# Patient Record
Sex: MALE | Race: WHITE | Employment: UNEMPLOYED | URBAN - NONMETROPOLITAN AREA
[De-identification: names, ages, dates, MRNs, and addresses within clinical notes are randomized per-mention and may not be internally consistent; named-entity substitution may affect disease eponyms.]

---

## 2017-09-11 ENCOUNTER — OFFICE VISIT (OUTPATIENT)
Dept: GASTROENTEROLOGY | Age: 37
End: 2017-09-11
Payer: COMMERCIAL

## 2017-09-11 VITALS
SYSTOLIC BLOOD PRESSURE: 118 MMHG | OXYGEN SATURATION: 97 % | HEIGHT: 75 IN | HEART RATE: 104 BPM | WEIGHT: 315 LBS | DIASTOLIC BLOOD PRESSURE: 72 MMHG | BODY MASS INDEX: 39.17 KG/M2

## 2017-09-11 DIAGNOSIS — K30 INDIGESTION: ICD-10-CM

## 2017-09-11 DIAGNOSIS — R14.2 BELCHING: ICD-10-CM

## 2017-09-11 DIAGNOSIS — R13.19 ESOPHAGEAL DYSPHAGIA: Primary | ICD-10-CM

## 2017-09-11 PROCEDURE — 99204 OFFICE O/P NEW MOD 45 MIN: CPT | Performed by: NURSE PRACTITIONER

## 2017-09-11 RX ORDER — OMEPRAZOLE 20 MG/1
20 CAPSULE, DELAYED RELEASE ORAL
Qty: 30 CAPSULE | Refills: 3 | Status: SHIPPED | OUTPATIENT
Start: 2017-09-11 | End: 2018-04-11

## 2017-09-11 RX ORDER — LAMOTRIGINE 25 MG/1
TABLET ORAL
Status: ON HOLD | COMMUNITY
Start: 2017-09-01 | End: 2017-10-05 | Stop reason: SINTOL

## 2017-09-11 ASSESSMENT — ENCOUNTER SYMPTOMS
COUGH: 0
NAUSEA: 0
VOMITING: 0
ABDOMINAL PAIN: 0
VOICE CHANGE: 0
CONSTIPATION: 0
BLOOD IN STOOL: 0
SHORTNESS OF BREATH: 0
RECTAL PAIN: 0
ABDOMINAL DISTENTION: 0
TROUBLE SWALLOWING: 1
DIARRHEA: 0
SORE THROAT: 0
CHEST TIGHTNESS: 0
BACK PAIN: 0

## 2017-09-16 ENCOUNTER — TRANSCRIBE ORDERS (OUTPATIENT)
Dept: LAB | Facility: HOSPITAL | Age: 37
End: 2017-09-16

## 2017-09-16 ENCOUNTER — LAB (OUTPATIENT)
Dept: LAB | Facility: HOSPITAL | Age: 37
End: 2017-09-16

## 2017-09-16 DIAGNOSIS — R53.83 OTHER FATIGUE: Primary | ICD-10-CM

## 2017-09-16 DIAGNOSIS — R53.83 OTHER FATIGUE: ICD-10-CM

## 2017-09-16 LAB
25(OH)D3 SERPL-MCNC: 14 NG/ML (ref 30–100)
ALBUMIN SERPL-MCNC: 4.2 G/DL (ref 3.5–5)
ALBUMIN/GLOB SERPL: 1.2 G/DL (ref 1.1–2.5)
ALP SERPL-CCNC: 79 U/L (ref 24–120)
ALT SERPL W P-5'-P-CCNC: 55 U/L (ref 0–54)
ANION GAP SERPL CALCULATED.3IONS-SCNC: 10 MMOL/L (ref 4–13)
AST SERPL-CCNC: 27 U/L (ref 7–45)
AUTO MIXED CELLS #: 0.4 10*3/MM3 (ref 0.1–2.6)
AUTO MIXED CELLS %: 6.9 % (ref 0.1–24)
BILIRUB SERPL-MCNC: 1.3 MG/DL (ref 0.1–1)
BUN BLD-MCNC: 8 MG/DL (ref 5–21)
BUN/CREAT SERPL: 12.7
CALCIUM SPEC-SCNC: 9.2 MG/DL (ref 8.4–10.4)
CHLORIDE SERPL-SCNC: 103 MMOL/L (ref 98–110)
CHOLEST SERPL-MCNC: 251 MG/DL (ref 130–200)
CO2 SERPL-SCNC: 26 MMOL/L (ref 24–31)
CREAT BLD-MCNC: 0.63 MG/DL (ref 0.5–1.4)
ERYTHROCYTE [DISTWIDTH] IN BLOOD BY AUTOMATED COUNT: 12.3 % (ref 12–15)
GFR SERPL CREATININE-BSD FRML MDRD: 144 ML/MIN/1.73
GLOBULIN UR ELPH-MCNC: 3.5 GM/DL
GLUCOSE BLD-MCNC: 331 MG/DL (ref 70–100)
HCT VFR BLD AUTO: 44.9 % (ref 40–52)
HDLC SERPL-MCNC: 37 MG/DL
HGB BLD-MCNC: 16.3 G/DL (ref 14–18)
LDLC SERPL CALC-MCNC: ABNORMAL MG/DL (ref 0–99)
LDLC/HDLC SERPL: ABNORMAL {RATIO}
LYMPHOCYTES # BLD AUTO: 2.3 10*3/MM3 (ref 0.8–7)
LYMPHOCYTES NFR BLD AUTO: 35.8 % (ref 15–45)
MCH RBC QN AUTO: 32.3 PG (ref 28–32)
MCHC RBC AUTO-ENTMCNC: 36.3 G/DL (ref 33–36)
MCV RBC AUTO: 89.1 FL (ref 82–95)
NEUTROPHILS # BLD AUTO: 3.8 10*3/MM3 (ref 1.5–8.3)
NEUTROPHILS NFR BLD AUTO: 57.3 % (ref 39–78)
PLATELET # BLD AUTO: 155 10*3/MM3 (ref 130–400)
PMV BLD AUTO: 9.7 FL (ref 6–12)
POTASSIUM BLD-SCNC: 4 MMOL/L (ref 3.5–5.3)
PROT SERPL-MCNC: 7.7 G/DL (ref 6.3–8.7)
RBC # BLD AUTO: 5.04 10*6/MM3 (ref 4.2–5.4)
SODIUM BLD-SCNC: 139 MMOL/L (ref 135–145)
TRIGL SERPL-MCNC: 575 MG/DL (ref 0–149)
TSH SERPL DL<=0.05 MIU/L-ACNC: 0.85 MIU/ML (ref 0.47–4.68)
VIT B12 BLD-MCNC: 494 PG/ML (ref 239–931)
VLDLC SERPL-MCNC: ABNORMAL MG/DL
WBC NRBC COR # BLD: 6.5 10*3/MM3 (ref 4.8–10.8)

## 2017-09-16 PROCEDURE — 36415 COLL VENOUS BLD VENIPUNCTURE: CPT

## 2017-09-16 PROCEDURE — 85025 COMPLETE CBC W/AUTO DIFF WBC: CPT

## 2017-09-16 PROCEDURE — 84403 ASSAY OF TOTAL TESTOSTERONE: CPT | Performed by: NURSE PRACTITIONER

## 2017-09-16 PROCEDURE — 84443 ASSAY THYROID STIM HORMONE: CPT | Performed by: NURSE PRACTITIONER

## 2017-09-16 PROCEDURE — 80053 COMPREHEN METABOLIC PANEL: CPT

## 2017-09-16 PROCEDURE — 82306 VITAMIN D 25 HYDROXY: CPT | Performed by: NURSE PRACTITIONER

## 2017-09-16 PROCEDURE — 84402 ASSAY OF FREE TESTOSTERONE: CPT | Performed by: NURSE PRACTITIONER

## 2017-09-16 PROCEDURE — 82607 VITAMIN B-12: CPT | Performed by: NURSE PRACTITIONER

## 2017-09-16 PROCEDURE — 80061 LIPID PANEL: CPT

## 2017-09-19 LAB
TESTOST FREE SERPL-MCNC: 10.8 PG/ML (ref 8.7–25.1)
TESTOST SERPL-MCNC: 164 NG/DL (ref 264–916)

## 2017-09-25 ENCOUNTER — LAB (OUTPATIENT)
Dept: LAB | Facility: HOSPITAL | Age: 37
End: 2017-09-25

## 2017-09-25 ENCOUNTER — TRANSCRIBE ORDERS (OUTPATIENT)
Dept: GENERAL RADIOLOGY | Facility: HOSPITAL | Age: 37
End: 2017-09-25

## 2017-09-25 DIAGNOSIS — R73.9 HYPERGLYCEMIA, UNSPECIFIED: Primary | ICD-10-CM

## 2017-09-25 DIAGNOSIS — R73.9 HYPERGLYCEMIA, UNSPECIFIED: ICD-10-CM

## 2017-09-25 LAB
AVERAGE GLUCOSE: NORMAL
HBA1C MFR BLD: 12.8 %
HBA1C MFR BLD: 12.8 %

## 2017-09-25 PROCEDURE — 36415 COLL VENOUS BLD VENIPUNCTURE: CPT

## 2017-09-25 PROCEDURE — 83036 HEMOGLOBIN GLYCOSYLATED A1C: CPT

## 2017-10-05 ENCOUNTER — ANESTHESIA EVENT (OUTPATIENT)
Dept: ENDOSCOPY | Age: 37
End: 2017-10-05
Payer: COMMERCIAL

## 2017-10-05 ENCOUNTER — HOSPITAL ENCOUNTER (OUTPATIENT)
Age: 37
Setting detail: OUTPATIENT SURGERY
Discharge: HOME | End: 2017-10-05
Attending: INTERNAL MEDICINE | Admitting: INTERNAL MEDICINE
Payer: COMMERCIAL

## 2017-10-05 ENCOUNTER — ANESTHESIA (OUTPATIENT)
Dept: ENDOSCOPY | Age: 37
End: 2017-10-05
Payer: COMMERCIAL

## 2017-10-05 VITALS
OXYGEN SATURATION: 99 % | HEIGHT: 75 IN | RESPIRATION RATE: 16 BRPM | BODY MASS INDEX: 39.17 KG/M2 | DIASTOLIC BLOOD PRESSURE: 77 MMHG | SYSTOLIC BLOOD PRESSURE: 119 MMHG | TEMPERATURE: 97.7 F | WEIGHT: 315 LBS | HEART RATE: 91 BPM

## 2017-10-05 VITALS
OXYGEN SATURATION: 96 % | SYSTOLIC BLOOD PRESSURE: 119 MMHG | DIASTOLIC BLOOD PRESSURE: 89 MMHG | RESPIRATION RATE: 14 BRPM

## 2017-10-05 LAB
GLUCOSE BLD-MCNC: 356 MG/DL (ref 70–99)
PERFORMED ON: ABNORMAL

## 2017-10-05 PROCEDURE — 82948 REAGENT STRIP/BLOOD GLUCOSE: CPT

## 2017-10-05 PROCEDURE — 87077 CULTURE AEROBIC IDENTIFY: CPT

## 2017-10-05 PROCEDURE — 3609012400 HC EGD TRANSORAL BIOPSY SINGLE/MULTIPLE: Performed by: INTERNAL MEDICINE

## 2017-10-05 PROCEDURE — 2500000003 HC RX 250 WO HCPCS: Performed by: NURSE ANESTHETIST, CERTIFIED REGISTERED

## 2017-10-05 PROCEDURE — 2580000003 HC RX 258: Performed by: INTERNAL MEDICINE

## 2017-10-05 PROCEDURE — 3700000000 HC ANESTHESIA ATTENDED CARE: Performed by: INTERNAL MEDICINE

## 2017-10-05 PROCEDURE — 88305 TISSUE EXAM BY PATHOLOGIST: CPT

## 2017-10-05 PROCEDURE — 7100000010 HC PHASE II RECOVERY - FIRST 15 MIN: Performed by: INTERNAL MEDICINE

## 2017-10-05 PROCEDURE — 7100000011 HC PHASE II RECOVERY - ADDTL 15 MIN: Performed by: INTERNAL MEDICINE

## 2017-10-05 PROCEDURE — 6360000002 HC RX W HCPCS: Performed by: NURSE ANESTHETIST, CERTIFIED REGISTERED

## 2017-10-05 PROCEDURE — 43239 EGD BIOPSY SINGLE/MULTIPLE: CPT | Performed by: INTERNAL MEDICINE

## 2017-10-05 PROCEDURE — 43248 EGD GUIDE WIRE INSERTION: CPT | Performed by: INTERNAL MEDICINE

## 2017-10-05 RX ORDER — LIDOCAINE HYDROCHLORIDE 10 MG/ML
1 INJECTION, SOLUTION INFILTRATION; PERINEURAL ONCE
Status: DISCONTINUED | OUTPATIENT
Start: 2017-10-05 | End: 2017-10-05 | Stop reason: HOSPADM

## 2017-10-05 RX ORDER — SODIUM CHLORIDE, SODIUM LACTATE, POTASSIUM CHLORIDE, CALCIUM CHLORIDE 600; 310; 30; 20 MG/100ML; MG/100ML; MG/100ML; MG/100ML
INJECTION, SOLUTION INTRAVENOUS CONTINUOUS
Status: DISCONTINUED | OUTPATIENT
Start: 2017-10-05 | End: 2017-10-05 | Stop reason: HOSPADM

## 2017-10-05 RX ORDER — ATORVASTATIN CALCIUM 20 MG/1
20 TABLET, FILM COATED ORAL DAILY
COMMUNITY
End: 2018-04-11

## 2017-10-05 RX ORDER — PROPOFOL 10 MG/ML
INJECTION, EMULSION INTRAVENOUS PRN
Status: DISCONTINUED | OUTPATIENT
Start: 2017-10-05 | End: 2017-10-05 | Stop reason: SDUPTHER

## 2017-10-05 RX ORDER — MIDAZOLAM HYDROCHLORIDE 1 MG/ML
INJECTION INTRAMUSCULAR; INTRAVENOUS PRN
Status: DISCONTINUED | OUTPATIENT
Start: 2017-10-05 | End: 2017-10-05 | Stop reason: SDUPTHER

## 2017-10-05 RX ORDER — LIDOCAINE HYDROCHLORIDE 10 MG/ML
INJECTION, SOLUTION EPIDURAL; INFILTRATION; INTRACAUDAL; PERINEURAL PRN
Status: DISCONTINUED | OUTPATIENT
Start: 2017-10-05 | End: 2017-10-05 | Stop reason: SDUPTHER

## 2017-10-05 RX ADMIN — LIDOCAINE HYDROCHLORIDE 5 ML: 10 INJECTION, SOLUTION EPIDURAL; INFILTRATION; INTRACAUDAL; PERINEURAL at 08:23

## 2017-10-05 RX ADMIN — MIDAZOLAM HYDROCHLORIDE 2 MG: 1 INJECTION, SOLUTION INTRAMUSCULAR; INTRAVENOUS at 08:23

## 2017-10-05 RX ADMIN — SODIUM CHLORIDE, POTASSIUM CHLORIDE, SODIUM LACTATE AND CALCIUM CHLORIDE: 600; 310; 30; 20 INJECTION, SOLUTION INTRAVENOUS at 07:53

## 2017-10-05 RX ADMIN — PROPOFOL 400 MG: 10 INJECTION, EMULSION INTRAVENOUS at 08:23

## 2017-10-05 ASSESSMENT — PAIN - FUNCTIONAL ASSESSMENT: PAIN_FUNCTIONAL_ASSESSMENT: 0-10

## 2017-10-05 NOTE — OP NOTE
Post Procedure Note    Name of surgeon / : Aida Rivas DO    Date of Service: 10/05/17    Pre-operative Diagnosis: Active Hospital Problems    Diagnosis Date Noted    Esophageal dysphagia [R13.14] 09/11/2017       Post-operative Diagnosis/Findings: mild distal esophageal stricture      Procedure: Procedure(s):  EGD BIOPSY / dilation over wire, 54 F savory    Anesthesia: Monitor Anesthesia Care    Surgeons/Assistants: Aida Rivas DO    Referring Physician: Enmanuel Bonilla DO    Procedure Note:  After informed consent was obtained, the patient was placed in the left lateral decubitus position and sedated per MAC. The endoscope was advanced down the oropharynx, down the esophagus, through the gastric lumen, into the duodenum. The small bowel appeared normal.  The gastric antrum was normal.  Antral biopsies were obtained for h. Pylori. The gastric body was normal.  Retroflexion was done which showed a normal fundus. The scope was withdrawn. The GE junction was at 45 cm. It was slightly jagged and irregular. Biopsies were obtained. There was a stricture seen in the distal esophagus at 44 cm. The remaining esophagus was normal.  Biopsies were taken from the mid-esophagus to rule out EE. The scope was then advanced back down into the gastric antrum. A guidewire was placed through the endoscope and the endoscope was removed. A 54 Tajik savory dilator was advanced down the length of the esophagus. The dilator and guidewire were removed. The patient tolerated the procedure well. Estimated Blood Loss: Minimal    Complications: None    Specimens:   ID Type Source Tests Collected by Time Destination   1 : MARY Tissue Stomach MARY TEST Shweta Taylor DO 10/5/2017 2538    A : distal esophagus r/o barretts Tissue Esophagus SURGICAL PATHOLOGY Shweta Taylor,  10/5/2017 0827    B : mid esophagus bx r/o EE Tissue Esophagus SURGICAL PATHOLOGY Shweta Taylor,  10/5/2017 0830        Discussion:  The

## 2017-10-05 NOTE — IP AVS SNAPSHOT
After Visit Summary  (Discharge Instructions)    Medication List for Home    Based on the information you provided to us as well as any changes during this visit, the following is your updated medication list.  Compare this with your prescription bottles at home. If you have any questions or concerns, contact your primary care physician's office. Daily Medication List (This medication list can be shared with any healthcare provider who is helping you manage your medications)      These are medications you told us you were taking at home, CONTINUE taking them after you leave the hospital        Last Dose    Next Dose Due AM NOON PM NIGHT    atorvastatin 20 MG tablet   Commonly known as:  LIPITOR   Take 20 mg by mouth daily                                         omeprazole 20 MG delayed release capsule   Commonly known as:  PRILOSEC   Take 1 capsule by mouth every morning (before breakfast)                                                 Allergies as of 10/5/2017        Reactions    Codeine     Niacin And Related       Immunizations as of 10/5/2017     No immunizations on file. Last Vitals          Most Recent Value    Temp  97.7 °F (36.5 °C)    Pulse  96    Resp  16    BP  106/69         After Visit Summary    This summary was created for you. Thank you for entrusting your care to us. The following information includes details about your hospital/visit stay along with steps you should take to help with your recovery once you leave the hospital.  In this packet, you will find information about the topics listed below:    · Instructions about your medications including a list of your home medications  · A summary of your hospital visit  · Follow-up appointments once you have left the hospital  · Your care plan at home      You may receive a survey regarding the care you received during your stay. Your input is valuable to us.  We encourage you to complete and return your survey in the envelope provided. We hope you will choose us in the future for your healthcare needs. Patient Information     Patient Name TYSHAWN Barrientos 1980      Care Provided at:     Name Address Phone       [address] [phone]            Your Visit    Here you will find information about your visit, including the reason for your visit. Please take this sheet with you when you visit your doctor or other health care provider in the future. It will help determine the best possible medical care for you at that time. If you have any questions once you leave the hospital, please call the department phone number listed below. Why you were here     Your primary diagnosis was:  Not on File    Your diagnoses also included:  Esophageal Dysphagia      Visit Information     Date & Time Provider Department Dept. Phone    10/5/2017 Isac Winter DO [facility] [phone]       Follow-up Appointments    Below is a list of your follow-up and future appointments. This may not be a complete list as you may have made appointments directly with providers that we are not aware of or your providers may have made some for you. Please call your providers to confirm appointments. It is important to keep your appointments. Please bring your current insurance card, photo ID, co-pay, and all medication bottles to your appointment. If self-pay, payment is expected at the time of service. Follow-up Information     Please follow up. Why:  As needed      Future Appointments     2017 7:30 AM     Appointment with Manuela Llanes MD at [facility] ([phone])   Please arrive 15 minutes prior to scheduled appointment time to complete paper work, bring photo ID and insurance cards.    [address]        Date Due HIV screening is recommended for all people regardless of risk factors  aged 15-65 years at least once (lifetime) who have never been HIV tested. 4/13/1996    Tetanus Combination Vaccine (1 - Tdap) 4/13/2000    Yearly Flu Vaccine (1) 9/1/2017                 Care Plan Once You Return Home    This section includes instructions you will need to follow once you leave the hospital.  Your care team will discuss these with you, so you and those caring for you know how to best care for your health needs at home. This section may also include educational information about certain health topics that may be of help to you. Discharge Instructions            Esophageal Dilation: What to Expect at Home  Your Recovery  After you have esophageal dilation, you will stay at the hospital or surgery center for 1 to 2 hours. This will allow the medicine to wear off. You will be able to go home after your doctor or nurse checks to make sure you are not having any problems. This care sheet gives you a general idea about how long it will take for you to recover. But each person recovers at a different pace. Follow the steps below to get better as quickly as possible. How can you care for yourself at home? Activity  · Rest as much as you need to after you go home. · You should be able to go back to your usual activities the day after the procedure. Diet  · Follow your doctor's directions for eating after the procedure. · Drink plenty of fluids (unless your doctor has told you not to). Medicines  · Your doctor will tell you if and when you can restart your medicines. He or she will also give you instructions about taking any new medicines. · If you take blood thinners, such as warfarin (Coumadin), clopidogrel (Plavix), or aspirin, be sure to talk to your doctor. He or she will tell you if and when to start taking those medicines again. Make sure that you understand exactly what your doctor wants you to do. Incorporated disclaims any warranty or liability for your use of this information. The patient had an esophageal stricture s/p egd with dilation. Repeat egd with dilation as needed. If he is positive for h. Pylori, we will treat. POST-OP ORDERS: ENDOSCOPY & COLONOSCOPY:    1. Rest today. 2. DO NOT eat or drink until wide awake; eat your usual diet today in moderate amount only. 3. DO NOT drive today. 4. Call physician if you have severe pain, vomiting, fever, rectal bleeding or black bowel movements. 5.  If a biopsy was taken or a polyp removed, you should expect to hear results in about 21 days. If you have heard nothing from your physician by then, call the office for results. 6.  Discharge home when patient awake, vitals signs stable and tolerating liquids. [facility] Signup     Our records indicate that you have an active KAYAK account. You can view your After Visit Summary by going to [email or web address] and logging in with your KAYAK username and password. If you don't have a KAYAK username and password but a parent or guardian has access to your record, the parent or guardian should login with their own KAYAK username and password and access your record to view the After Visit Summary. Additional Information  If you have questions, please contact the physician practice where you receive care. Remember, KAYAK is NOT to be used for urgent needs. For medical emergencies, dial 911. For questions regarding your KAYAK account call [phone]. If you have a clinical question, please call your doctor's office. View your information online  ? Review your current list of  medications, immunization, and allergies. ? Review your future test results online . ?  Review your discharge instructions provided by your caregivers at discharge    Certain functionality such as prescription refills, scheduling

## 2017-10-05 NOTE — H&P
Patient Name: Cari Van  : 1980  MRN: [id number]    Allergies: Allergies   Allergen Reactions    Codeine     Niacin And Related          ENDOSCOPY / COLONOSCOPY / BRONCHOSCOPY      PRE-SEDATION ASSESSMENT      Procedure:    [] Colonoscopy     [x] Endoscopy      [] ERCP      [] Bronchoscopy      [] Other  [] History and Physical completed in chart for Inpatient or within 30 daysfrom office. I have examined the patient's status immediately prior to the procedure and:    [x] No interval change in patient status since H&P completed  [] Interval change in patient status (explained below)           BRIEF H&P    HPI/changes/indicators/diagnosis  Active Hospital Problems    Diagnosis Date Noted    Esophageal dysphagia [R13.14] 2017       Medications:   Prior to Admission medications    Medication Sig Start Date End Date Taking? Authorizing Provider   atorvastatin (LIPITOR) 20 MG tablet Take 20 mg by mouth daily   Yes Historical Provider, MD   omeprazole (PRILOSEC) 20 MG delayed release capsule Take 1 capsule by mouth every morning (before breakfast) 17  Yes ELENA Randall       Allergies:   is allergic to codeine and niacin and related. Vital Signs:   Vitals:    10/05/17 0736   BP: (!) 146/94   Pulse: 98   Resp: 18   Temp: 98.3 °F (36.8 °C)   SpO2: 95%       ROS:  Cardiac:  [x]WNL  []Comments:  Pulmonary:  [x]WNL   []Comments:  Neuro/Mental Status:  [x]WNL  []Comments:  Abdominal:                   Active Hospital Problems    Diagnosis Date Noted    Esophageal dysphagia [R13.14] 2017        All other pertinent GI symptoms negative.     Physical Exam:  Cardiac:  [x]WNL  []Comments:  Pulmonary:  [x]WNL   []Comments:  Neuro/Mental Status:  [x]WNL  []Comments:  Abdominal:  [x]WNL    []Comments:  Other:   []WNL  []Comments:    Informed Consent:  The risks and benefits of the procedure have been discussed with either the patient or if they cannot consent, their

## 2017-10-05 NOTE — IP AVS SNAPSHOT
Patient Information     Patient Name TYSHAWN Diane 1980      SEDATION/ANALGESIA INFORMATION/HOME GOING ADVICE     SEDATION / ANALGESIA INFORMATION / [address] 86 have received the sedation/analgesia medication during your visit    Sedation/analgesia is used during short medical procedures under controlled supervision. The medication will produce a strong relaxation. You will be able to hear, speak and follow instructions, but your memory and alertness will be decreased. You will be able to swallow and breathe on your own. During sedation/analgesia your blood pressure, heart and breathing will be watched closely. After the procedure, you may not remember what was said or done. You may have the following effects from the medication. \" Drowsiness, dizziness, sleepiness or confusion. \" Difficulty remembering or delayed reaction times. \" Loss of fine muscle control or difficulty with your balance especially while walking. \" Difficulty focusing or blurred vision. You may not be aware of slight changes in your behavior and/or your reaction time because of the medication used during the procedure. Therefore you should follow these instructions. \" Have someone responsible help you with your care. \" Do not drive for 24 hours. \" Do not operate equipment for 24 hours (lawnmowers, power tools, kitchen accessories, stove). \" Do not drink any alcoholic beverages for a minimum of 24 hours. \" Do not make important personal, legal or business decisions for 24 hours. \" You may experience dizziness or lightheadedness. Move slowly and carefully, do not make sudden position changes. \" Drink extra amounts of fluids today. \" Increase your diet as tolerated (unless you have received specific instructions from your doctor). \" If you feel nauseated, continue with liquids until the nausea is gone. \" Notify your physician if you have not urinated within 8 hours after the procedure. \" Resume your medications unless otherwise instructed. Contact your physician if you have any questions or concerns.     IF YOU REPORT TO AN EMERGENCY ROOM, DOCTOR'S OFFICE OR HOSPITAL WITHIN 24 HOURS AFTER YOUR PROCEDURE, BRING THIS SHEET AND YOUR AFTER VISIT SUMMARY WITH YOU AND GIVE IT TO THE PHYSICIAN OR NURSE ATTENDING YOU.

## 2017-10-05 NOTE — ANESTHESIA POSTPROCEDURE EVALUATION
Department of Anesthesiology  Postprocedure Note    Patient: Lui Ackerman  MRN: [id number]  [place]: 1980  Date of evaluation: 10/5/2017  Time:  8:33 AM     Procedure Summary     Date Anesthesia Start Anesthesia Stop Room / Location    10/05/17 0818  [facility] 11 / [facility]       Procedure Diagnosis Surgeon Responsible Provider    EGD BIOPSY (N/A ) (DYSPHAGIA, INDIGESTION) DO Froilan Mejia CRNA          Anesthesia Type: general, TIVA    Arben Phase I: Arben Score: 10    Arben Phase II:      Last vitals:  BP (!) 146/94 (10/05/17 0736)    Temp 98.3 °F (36.8 °C) (10/05/17 0736)    Pulse 98 (10/05/17 0736)   Resp 18 (10/05/17 0736)    SpO2 95 % (10/05/17 0736)      Anesthesia Post Evaluation    Patient location during evaluation: bedside  Patient participation: complete - patient participated  Level of consciousness: sleepy but conscious  Pain score: 0  Airway patency: patent  Nausea & Vomiting: no nausea and no vomiting  Complications: no  Cardiovascular status: hemodynamically stable and blood pressure returned to baseline  Respiratory status: acceptable and nasal cannula  Hydration status: stable

## 2017-10-06 LAB — CLOTEST: NEGATIVE

## 2017-11-01 ENCOUNTER — OFFICE VISIT (OUTPATIENT)
Dept: PRIMARY CARE CLINIC | Age: 37
End: 2017-11-01
Payer: COMMERCIAL

## 2017-11-01 VITALS
WEIGHT: 315 LBS | TEMPERATURE: 97.3 F | BODY MASS INDEX: 39.17 KG/M2 | DIASTOLIC BLOOD PRESSURE: 84 MMHG | OXYGEN SATURATION: 98 % | SYSTOLIC BLOOD PRESSURE: 132 MMHG | HEART RATE: 100 BPM | HEIGHT: 75 IN

## 2017-11-01 DIAGNOSIS — Z23 NEED FOR DIPHTHERIA-TETANUS-PERTUSSIS (TDAP) VACCINE: ICD-10-CM

## 2017-11-01 DIAGNOSIS — Z23 FLU VACCINE NEED: ICD-10-CM

## 2017-11-01 DIAGNOSIS — E11.8 TYPE 2 DIABETES MELLITUS WITH COMPLICATION, WITHOUT LONG-TERM CURRENT USE OF INSULIN (HCC): Primary | ICD-10-CM

## 2017-11-01 PROCEDURE — 90715 TDAP VACCINE 7 YRS/> IM: CPT | Performed by: FAMILY MEDICINE

## 2017-11-01 PROCEDURE — 99203 OFFICE O/P NEW LOW 30 MIN: CPT | Performed by: FAMILY MEDICINE

## 2017-11-01 PROCEDURE — 90688 IIV4 VACCINE SPLT 0.5 ML IM: CPT | Performed by: FAMILY MEDICINE

## 2017-11-01 PROCEDURE — 90471 IMMUNIZATION ADMIN: CPT | Performed by: FAMILY MEDICINE

## 2017-11-01 PROCEDURE — 90472 IMMUNIZATION ADMIN EACH ADD: CPT | Performed by: FAMILY MEDICINE

## 2017-11-01 RX ORDER — BLOOD-GLUCOSE METER
EACH MISCELLANEOUS
Qty: 1 KIT | Refills: 0 | Status: SHIPPED | OUTPATIENT
Start: 2017-11-01

## 2017-11-01 ASSESSMENT — ENCOUNTER SYMPTOMS
EYE PAIN: 0
WHEEZING: 0
ANAL BLEEDING: 0
VOMITING: 0
BLOOD IN STOOL: 0
COLOR CHANGE: 0
NAUSEA: 0
SHORTNESS OF BREATH: 0

## 2017-11-01 NOTE — PROGRESS NOTES
After obtaining consent, and per orders of Dr. Ziggy Dean, injection of Flu given in Left deltoid by Lavelle Lobo. Patient instructed to remain in clinic for 20 minutes afterwards, and to report any adverse reaction to me immediately. After obtaining consent, and per orders of Dr. Ziggy Dean, injection of Tdap given in Left deltoid by Lavelle Lobo. Patient instructed to remain in clinic for 20 minutes afterwards, and to report any adverse reaction to me immediately.

## 2017-11-01 NOTE — PROGRESS NOTES
Rafita Reina is a 40 y.o. male  Chief Complaint   Patient presents with    New Patient     DM  HPI  Rafita Reina presents to the office to establish care. . Patient has long-standing history of diabetes. Patient has been on and off medications over the past 16 years. Patient states he has been unable to tolerate Januvia or metformin. Patient had a rash related to Januvia. Patient had profuse diarrhea with the metformin. Patient is not currently on any medications. Last A1c done at [address] [place] [address] was 12.8. His blood sugar on [facility] showed 355. Patient has recently switched from high carb diet to a keto diet. Patient is not currently on any treatment. Patient also had significantly elevated cholesterol. Patient had elevated triglycerides. Review of Systems   Constitutional: Negative for activity change and appetite change. Eyes: Negative for pain. Respiratory: Negative for shortness of breath and wheezing. Cardiovascular: Negative for chest pain and leg swelling. Gastrointestinal: Negative for anal bleeding, blood in stool, nausea and vomiting. Skin: Negative for color change and rash. Neurological: Negative for seizures and headaches. Prior to Admission medications    Medication Sig Start Date End Date Taking? Authorizing Provider   Blood Glucose Monitoring Suppl (ONE TOUCH ULTRA 2) w/Device KIT Test fasting daily. Disp 100 strips and 100 lancets.  11/1/17  Yes Dee Marinelli MD   Liraglutide (VICTOZA) 18 MG/3ML SOPN SC injection Inject 1.2 mg into the skin daily 11/1/17  Yes Dee Marinelli MD   atorvastatin (LIPITOR) 20 MG tablet Take 20 mg by mouth daily   Yes Historical Provider, MD   omeprazole (PRILOSEC) 20 MG delayed release capsule Take 1 capsule by mouth every morning (before breakfast) 9/11/17  Yes ELENA Allan       Past Medical History:   Diagnosis Date    Diabetes mellitus ([facility] [address]     Hyperlipidemia     Sleep apnea     no longer present with weight loss 10-5-17       Past Surgical History:   Procedure Laterality Date    COLONOSCOPY      MD EGD TRANSORAL BIOPSY SINGLE/MULTIPLE N/A 10/5/2017    Dr Taylor-w/dilation over wire, 47 Icelandic-esophageal stricture-Faith (-) chronic inflammation    TONSILLECTOMY AND ADENOIDECTOMY         Social History     Social History    Marital status:      Spouse name: N/A    Number of children: N/A    Years of education: N/A     Social History Main Topics    Smoking status: Never Smoker    Smokeless tobacco: Never Used    Alcohol use No    Drug use: No    Sexual activity: Not Asked     Other Topics Concern    None     Social History Narrative    None       Physical Exam   Constitutional: He is oriented to person, place, and time. He appears well-developed and well-nourished. HENT:   Head: Normocephalic and atraumatic. Right Ear: External ear normal.   Left Ear: External ear normal.   Nose: Nose normal.   Mouth/Throat: Oropharynx is clear and moist.   Eyes: Conjunctivae are normal. Pupils are equal, round, and reactive to light. No scleral icterus. Neck: No JVD present. Carotid bruit is not present. Cardiovascular: Normal rate, regular rhythm and normal heart sounds. Exam reveals no friction rub. No murmur heard. Pulmonary/Chest: Effort normal and breath sounds normal. No respiratory distress. He has no wheezes. Abdominal: Soft. Bowel sounds are normal. He exhibits no distension and no mass. There is no tenderness. There is no rebound and no guarding. Musculoskeletal: Normal range of motion. Lymphadenopathy:     He has no cervical adenopathy. Right cervical: No superficial cervical and no posterior cervical adenopathy present. Left cervical: No superficial cervical and no posterior cervical adenopathy present. Neurological: He is alert and oriented to person, place, and time. No cranial nerve deficit. Skin: No rash noted. No erythema. Psychiatric: He has a normal mood and affect.

## 2017-11-29 ENCOUNTER — OFFICE VISIT (OUTPATIENT)
Dept: PRIMARY CARE CLINIC | Age: 37
End: 2017-11-29
Payer: COMMERCIAL

## 2017-11-29 VITALS
DIASTOLIC BLOOD PRESSURE: 82 MMHG | WEIGHT: 315 LBS | TEMPERATURE: 96.7 F | HEIGHT: 75 IN | OXYGEN SATURATION: 97 % | BODY MASS INDEX: 39.17 KG/M2 | HEART RATE: 90 BPM | SYSTOLIC BLOOD PRESSURE: 136 MMHG

## 2017-11-29 DIAGNOSIS — E11.42 TYPE 2 DIABETES MELLITUS WITH DIABETIC POLYNEUROPATHY, WITHOUT LONG-TERM CURRENT USE OF INSULIN (HCC): Primary | ICD-10-CM

## 2017-11-29 PROCEDURE — 99213 OFFICE O/P EST LOW 20 MIN: CPT | Performed by: FAMILY MEDICINE

## 2017-11-29 RX ORDER — PREGABALIN 50 MG/1
50 CAPSULE ORAL 2 TIMES DAILY
Qty: 60 CAPSULE | Refills: 3 | Status: SHIPPED | OUTPATIENT
Start: 2017-11-29 | End: 2018-04-11

## 2017-11-29 ASSESSMENT — ENCOUNTER SYMPTOMS
COUGH: 0
SHORTNESS OF BREATH: 0

## 2017-11-29 NOTE — PROGRESS NOTES
Take 20 mg by mouth daily   Yes Historical Provider, MD   omeprazole (PRILOSEC) 20 MG delayed release capsule Take 1 capsule by mouth every morning (before breakfast) 9/11/17  Yes ELENA Dunham       Past Medical History:   Diagnosis Date    Diabetes mellitus ([address]     Hyperlipidemia     Sleep apnea     no longer present with weight loss 10-5-17       Past Surgical History:   Procedure Laterality Date    COLONOSCOPY      CA EGD TRANSORAL BIOPSY SINGLE/MULTIPLE N/A 10/5/2017    Dr Taylor-w/dilation over wire, 47 Slovak-esophageal stricture-Faith (-) chronic inflammation    TONSILLECTOMY AND ADENOIDECTOMY         Social History     Social History    Marital status:      Spouse name: N/A    Number of children: N/A    Years of education: N/A     Social History Main Topics    Smoking status: Never Smoker    Smokeless tobacco: Never Used    Alcohol use No    Drug use: No    Sexual activity: Not Asked     Other Topics Concern    None     Social History Narrative    None     /82   Pulse 90   Temp 96.7 °F (35.9 °C)   Ht 6' 3\" (1.905 m)   Wt (!) 332 lb (150.6 kg)   SpO2 97%   BMI 41.50 kg/m²     Physical Exam   Constitutional: He is oriented to person, place, and time. He appears well-developed and well-nourished. HENT:   Head: Normocephalic and atraumatic. Eyes: Conjunctivae are normal. No scleral icterus. Neck: Carotid bruit is not present. Cardiovascular: Normal rate, regular rhythm and normal heart sounds. Exam reveals no friction rub. No murmur heard. Pulmonary/Chest: Effort normal and breath sounds normal. No respiratory distress. He has no wheezes. Abdominal: Soft. Bowel sounds are normal. He exhibits no distension and no mass. There is no tenderness. There is no rebound and no guarding. Musculoskeletal: Normal range of motion. Lymphadenopathy:        Right cervical: No superficial cervical and no posterior cervical adenopathy present.        Left cervical: No

## 2018-03-06 DIAGNOSIS — E11.42 TYPE 2 DIABETES MELLITUS WITH DIABETIC POLYNEUROPATHY, WITHOUT LONG-TERM CURRENT USE OF INSULIN (HCC): ICD-10-CM

## 2018-04-11 ENCOUNTER — OFFICE VISIT (OUTPATIENT)
Dept: PRIMARY CARE CLINIC | Age: 38
End: 2018-04-11
Payer: COMMERCIAL

## 2018-04-11 VITALS
SYSTOLIC BLOOD PRESSURE: 134 MMHG | DIASTOLIC BLOOD PRESSURE: 82 MMHG | BODY MASS INDEX: 39.17 KG/M2 | TEMPERATURE: 97.3 F | WEIGHT: 315 LBS | OXYGEN SATURATION: 98 % | HEIGHT: 75 IN | HEART RATE: 102 BPM

## 2018-04-11 DIAGNOSIS — Z79.4 TYPE 2 DIABETES MELLITUS WITH COMPLICATION, WITH LONG-TERM CURRENT USE OF INSULIN (HCC): Primary | ICD-10-CM

## 2018-04-11 DIAGNOSIS — E11.8 TYPE 2 DIABETES MELLITUS WITH COMPLICATION, WITH LONG-TERM CURRENT USE OF INSULIN (HCC): Primary | ICD-10-CM

## 2018-04-11 DIAGNOSIS — E11.8 TYPE 2 DIABETES MELLITUS WITH COMPLICATION, WITH LONG-TERM CURRENT USE OF INSULIN (HCC): ICD-10-CM

## 2018-04-11 DIAGNOSIS — Z79.4 TYPE 2 DIABETES MELLITUS WITH COMPLICATION, WITH LONG-TERM CURRENT USE OF INSULIN (HCC): ICD-10-CM

## 2018-04-11 LAB
ALBUMIN SERPL-MCNC: 4.4 G/DL (ref 3.5–5.2)
ALP BLD-CCNC: 62 U/L (ref 40–130)
ALT SERPL-CCNC: 38 U/L (ref 5–41)
ANION GAP SERPL CALCULATED.3IONS-SCNC: 18 MMOL/L (ref 7–19)
AST SERPL-CCNC: 21 U/L (ref 5–40)
BILIRUB SERPL-MCNC: 0.8 MG/DL (ref 0.2–1.2)
BUN BLDV-MCNC: 8 MG/DL (ref 6–20)
CALCIUM SERPL-MCNC: 9.5 MG/DL (ref 8.6–10)
CHLORIDE BLD-SCNC: 98 MMOL/L (ref 98–111)
CHOLESTEROL, TOTAL: 260 MG/DL (ref 160–199)
CO2: 24 MMOL/L (ref 22–29)
CREAT SERPL-MCNC: 0.7 MG/DL (ref 0.5–1.2)
CREATININE URINE: 88.3 MG/DL (ref 4.2–622)
GFR NON-AFRICAN AMERICAN: >60
GLUCOSE BLD-MCNC: 335 MG/DL (ref 74–109)
HBA1C MFR BLD: 11.2 %
HDLC SERPL-MCNC: 44 MG/DL (ref 55–121)
LDL CHOLESTEROL CALCULATED: ABNORMAL MG/DL
LDL CHOLESTEROL DIRECT: 162 MG/DL
MICROALBUMIN UR-MCNC: 5.6 MG/DL (ref 0–19)
MICROALBUMIN/CREAT UR-RTO: 63.4 MG/G
POTASSIUM SERPL-SCNC: 4.3 MMOL/L (ref 3.5–5)
SODIUM BLD-SCNC: 140 MMOL/L (ref 136–145)
TOTAL PROTEIN: 7.8 G/DL (ref 6.6–8.7)
TRIGL SERPL-MCNC: 508 MG/DL (ref 0–149)

## 2018-04-11 PROCEDURE — 99213 OFFICE O/P EST LOW 20 MIN: CPT | Performed by: FAMILY MEDICINE

## 2018-04-11 ASSESSMENT — PATIENT HEALTH QUESTIONNAIRE - PHQ9
5. POOR APPETITE OR OVEREATING: 3
4. FEELING TIRED OR HAVING LITTLE ENERGY: 3
SUM OF ALL RESPONSES TO PHQ9 QUESTIONS 1 & 2: 6
10. IF YOU CHECKED OFF ANY PROBLEMS, HOW DIFFICULT HAVE THESE PROBLEMS MADE IT FOR YOU TO DO YOUR WORK, TAKE CARE OF THINGS AT HOME, OR GET ALONG WITH OTHER PEOPLE: 3
1. LITTLE INTEREST OR PLEASURE IN DOING THINGS: 3
8. MOVING OR SPEAKING SO SLOWLY THAT OTHER PEOPLE COULD HAVE NOTICED. OR THE OPPOSITE, BEING SO FIGETY OR RESTLESS THAT YOU HAVE BEEN MOVING AROUND A LOT MORE THAN USUAL: 0
6. FEELING BAD ABOUT YOURSELF - OR THAT YOU ARE A FAILURE OR HAVE LET YOURSELF OR YOUR FAMILY DOWN: 3
7. TROUBLE CONCENTRATING ON THINGS, SUCH AS READING THE NEWSPAPER OR WATCHING TELEVISION: 3
2. FEELING DOWN, DEPRESSED OR HOPELESS: 3
9. THOUGHTS THAT YOU WOULD BE BETTER OFF DEAD, OR OF HURTING YOURSELF: 0
SUM OF ALL RESPONSES TO PHQ QUESTIONS 1-9: 21
3. TROUBLE FALLING OR STAYING ASLEEP: 3

## 2018-04-11 ASSESSMENT — ENCOUNTER SYMPTOMS
COUGH: 0
SHORTNESS OF BREATH: 0
COLOR CHANGE: 0

## 2018-05-16 ENCOUNTER — TELEPHONE (OUTPATIENT)
Dept: PRIMARY CARE CLINIC | Age: 38
End: 2018-05-16

## 2018-06-14 ENCOUNTER — OFFICE VISIT (OUTPATIENT)
Dept: PRIMARY CARE CLINIC | Age: 38
End: 2018-06-14
Payer: COMMERCIAL

## 2018-06-14 ENCOUNTER — HOSPITAL ENCOUNTER (OUTPATIENT)
Dept: GENERAL RADIOLOGY | Age: 38
Discharge: HOME | End: 2018-06-14
Payer: COMMERCIAL

## 2018-06-14 VITALS
HEART RATE: 115 BPM | WEIGHT: 315 LBS | BODY MASS INDEX: 39.17 KG/M2 | OXYGEN SATURATION: 96 % | SYSTOLIC BLOOD PRESSURE: 120 MMHG | HEIGHT: 75 IN | TEMPERATURE: 98 F | DIASTOLIC BLOOD PRESSURE: 82 MMHG

## 2018-06-14 DIAGNOSIS — E11.69 DIABETES MELLITUS TYPE 2 IN OBESE (HCC): Primary | ICD-10-CM

## 2018-06-14 DIAGNOSIS — E66.9 DIABETES MELLITUS TYPE 2 IN OBESE (HCC): Primary | ICD-10-CM

## 2018-06-14 DIAGNOSIS — Z13.31 POSITIVE DEPRESSION SCREENING: ICD-10-CM

## 2018-06-14 DIAGNOSIS — L60.8 TOENAIL DEFORMITY: ICD-10-CM

## 2018-06-14 DIAGNOSIS — E11.42 TYPE 2 DIABETES MELLITUS WITH DIABETIC POLYNEUROPATHY, WITHOUT LONG-TERM CURRENT USE OF INSULIN (HCC): ICD-10-CM

## 2018-06-14 DIAGNOSIS — M79.671 FOOT PAIN, RIGHT: ICD-10-CM

## 2018-06-14 DIAGNOSIS — G62.9 NEUROPATHY: ICD-10-CM

## 2018-06-14 DIAGNOSIS — E78.2 MIXED HYPERLIPIDEMIA: ICD-10-CM

## 2018-06-14 PROCEDURE — 73620 X-RAY EXAM OF FOOT: CPT

## 2018-06-14 PROCEDURE — 2028F FOOT EXAM PERFORMED: CPT | Performed by: NURSE PRACTITIONER

## 2018-06-14 PROCEDURE — 99214 OFFICE O/P EST MOD 30 MIN: CPT | Performed by: NURSE PRACTITIONER

## 2018-06-14 PROCEDURE — G8431 POS CLIN DEPRES SCRN F/U DOC: HCPCS | Performed by: NURSE PRACTITIONER

## 2018-06-14 RX ORDER — PREGABALIN 50 MG/1
50 CAPSULE ORAL 2 TIMES DAILY
Qty: 60 CAPSULE | Refills: 3 | Status: SHIPPED | OUTPATIENT
Start: 2018-06-14 | End: 2019-04-02 | Stop reason: SDUPTHER

## 2018-06-14 ASSESSMENT — ENCOUNTER SYMPTOMS
EYES NEGATIVE: 1
GASTROINTESTINAL NEGATIVE: 1
RESPIRATORY NEGATIVE: 1

## 2018-06-19 ENCOUNTER — TELEPHONE (OUTPATIENT)
Dept: PRIMARY CARE CLINIC | Age: 38
End: 2018-06-19

## 2018-07-17 ENCOUNTER — OFFICE VISIT (OUTPATIENT)
Dept: PRIMARY CARE CLINIC | Age: 38
End: 2018-07-17
Payer: COMMERCIAL

## 2018-07-17 VITALS
SYSTOLIC BLOOD PRESSURE: 134 MMHG | TEMPERATURE: 97.2 F | HEIGHT: 75 IN | OXYGEN SATURATION: 98 % | HEART RATE: 104 BPM | BODY MASS INDEX: 39.17 KG/M2 | DIASTOLIC BLOOD PRESSURE: 86 MMHG | WEIGHT: 315 LBS

## 2018-07-17 DIAGNOSIS — K30 INDIGESTION: ICD-10-CM

## 2018-07-17 DIAGNOSIS — Z91.14 NONCOMPLIANCE WITH MEDICATION REGIMEN: ICD-10-CM

## 2018-07-17 LAB — HBA1C MFR BLD: 11.7 %

## 2018-07-17 PROCEDURE — 99214 OFFICE O/P EST MOD 30 MIN: CPT | Performed by: NURSE PRACTITIONER

## 2018-07-17 PROCEDURE — 83036 HEMOGLOBIN GLYCOSYLATED A1C: CPT | Performed by: NURSE PRACTITIONER

## 2018-07-17 RX ORDER — LIRAGLUTIDE 6 MG/ML
INJECTION SUBCUTANEOUS
Refills: 3 | COMMUNITY
Start: 2018-04-11 | End: 2018-07-17 | Stop reason: SINTOL

## 2018-07-17 RX ORDER — OMEPRAZOLE 20 MG/1
20 CAPSULE, DELAYED RELEASE ORAL
Qty: 30 CAPSULE | Refills: 3 | Status: SHIPPED | OUTPATIENT
Start: 2018-07-17 | End: 2018-10-01

## 2018-07-17 ASSESSMENT — ENCOUNTER SYMPTOMS
DIARRHEA: 1
RESPIRATORY NEGATIVE: 1
CONSTIPATION: 0
ABDOMINAL PAIN: 0
EYES NEGATIVE: 1
ANAL BLEEDING: 0
ABDOMINAL DISTENTION: 0
NAUSEA: 1

## 2018-07-17 NOTE — PROGRESS NOTES
every 7 days 4 pen 1    Blood Glucose Monitoring Suppl (ONE TOUCH ULTRA 2) w/Device KIT Test fasting daily. Disp 100 strips and 100 lancets. 1 kit 0    pregabalin (LYRICA) 50 MG capsule Take 1 capsule by mouth 2 times daily for 30 days. . 60 capsule 3     No current facility-administered medications for this visit. Allergies   Allergen Reactions    Codeine     Januvia [Sitagliptin]     Niacin And Related        Family History   Problem Relation Age of Onset    Cancer Father     Colon Cancer Maternal Uncle     Colon Polyps Neg Hx     Liver Cancer Neg Hx     Liver Disease Neg Hx     Esophageal Cancer Neg Hx     Retinal Detachment Neg Hx     Stomach Cancer Neg Hx     Rectal Cancer Neg Hx                Subjective:      Review of Systems   Constitutional: Negative. HENT: Negative. Eyes: Negative. Respiratory: Negative. Cardiovascular: Negative. Gastrointestinal: Positive for diarrhea and nausea. Negative for abdominal distention, abdominal pain, anal bleeding and constipation. Endocrine: Negative. Genitourinary: Negative. Musculoskeletal: Negative. Skin: Negative. Neurological: Negative. Hematological: Negative. Psychiatric/Behavioral: Negative. Objective:     Physical Exam   Constitutional: He is oriented to person, place, and time. Vital signs are normal. He appears well-developed and well-nourished. HENT:   Head: Normocephalic and atraumatic. Right Ear: Hearing, tympanic membrane, external ear and ear canal normal.   Left Ear: Hearing, tympanic membrane, external ear and ear canal normal.   Nose: Nose normal.   Mouth/Throat: Uvula is midline, oropharynx is clear and moist and mucous membranes are normal.   Eyes: Conjunctivae, EOM and lids are normal. Pupils are equal, round, and reactive to light. Neck: Trachea normal and normal range of motion. Neck supple. No thyroid mass and no thyromegaly present.    Cardiovascular: Normal rate, regular rhythm,  POCT glycosylated hemoglobin (Hb A1C)       Orders Placed This Encounter   Medications    Insulin Degludec (TRESIBA FLEXTOUCH) 200 UNIT/ML SOPN     Sig: Inject 20 Units into the skin nightly     Dispense:  5 pen     Refill:  1    Insulin Pen Needle ([employer] PEN NEEDLES 31G) 31G X 8 MM MISC     Si each by Does not apply route daily     Dispense:  100 each     Refill:  3    omeprazole (PRILOSEC) 20 MG delayed release capsule     Sig: Take 1 capsule by mouth every morning (before breakfast)     Dispense:  30 capsule     Refill:  3        Patient given educational materials - see patient instructions. Discussed use, benefit, and side effects of prescribed medications. All patient questions answered. Pt voiced understanding. Reviewed health maintenance. Instructed to continue current medications, diet and exercise. Patient agreed with treatment plan. Follow up as directed.            Electronically signed by ELENA Coates on 2018 at 10:31 AM

## 2018-10-01 ENCOUNTER — OFFICE VISIT (OUTPATIENT)
Dept: PRIMARY CARE CLINIC | Age: 38
End: 2018-10-01
Payer: COMMERCIAL

## 2018-10-01 VITALS
WEIGHT: 315 LBS | TEMPERATURE: 96.8 F | BODY MASS INDEX: 39.17 KG/M2 | SYSTOLIC BLOOD PRESSURE: 130 MMHG | HEART RATE: 88 BPM | OXYGEN SATURATION: 99 % | DIASTOLIC BLOOD PRESSURE: 110 MMHG | HEIGHT: 75 IN

## 2018-10-01 DIAGNOSIS — Z23 NEED FOR INFLUENZA VACCINATION: ICD-10-CM

## 2018-10-01 DIAGNOSIS — E11.9 TYPE 2 DIABETES MELLITUS WITHOUT COMPLICATION, WITHOUT LONG-TERM CURRENT USE OF INSULIN (HCC): Primary | ICD-10-CM

## 2018-10-01 DIAGNOSIS — Z23 NEED FOR PROPHYLACTIC VACCINATION AGAINST STREPTOCOCCUS PNEUMONIAE (PNEUMOCOCCUS): ICD-10-CM

## 2018-10-01 PROCEDURE — 90471 IMMUNIZATION ADMIN: CPT | Performed by: FAMILY MEDICINE

## 2018-10-01 PROCEDURE — 90732 PPSV23 VACC 2 YRS+ SUBQ/IM: CPT | Performed by: FAMILY MEDICINE

## 2018-10-01 PROCEDURE — 90686 IIV4 VACC NO PRSV 0.5 ML IM: CPT | Performed by: FAMILY MEDICINE

## 2018-10-01 PROCEDURE — 90472 IMMUNIZATION ADMIN EACH ADD: CPT | Performed by: FAMILY MEDICINE

## 2018-10-01 PROCEDURE — 99213 OFFICE O/P EST LOW 20 MIN: CPT | Performed by: FAMILY MEDICINE

## 2018-10-01 ASSESSMENT — ENCOUNTER SYMPTOMS
WHEEZING: 0
VOMITING: 0
NAUSEA: 0
EYE PAIN: 0
BLOOD IN STOOL: 0
ANAL BLEEDING: 0
SHORTNESS OF BREATH: 0
COLOR CHANGE: 0

## 2018-10-01 NOTE — PROGRESS NOTES
Louis Velez MD   ONE TOUCH ULTRA TEST strip TEST FASTING DAILY 9/23/18  Yes Leroy Miller MD   Insulin Degludec (TRESIBA FLEXTOUCH) 200 UNIT/ML SOPN Inject 20 Units into the skin nightly 7/17/18  Yes ELENA Reeves   Insulin Pen Needle ([employer] PEN NEEDLES 31G) 31G X 8 MM MISC 1 each by Does not apply route daily 7/17/18  Yes ELENA Reeves   pregabalin (LYRICA) 50 MG capsule Take 1 capsule by mouth 2 times daily for 30 days. . 6/14/18 10/1/18 Yes ELENA Reeves   Blood Glucose Monitoring Suppl (ONE TOUCH ULTRA 2) w/Device KIT Test fasting daily. Disp 100 strips and 100 lancets. 11/1/17  Yes Leroy Miller MD       Past Medical History:   Diagnosis Date    Diabetes mellitus ([facility] [address]     Hyperlipidemia     Sleep apnea     no longer present with weight loss 10-5-17       Past Surgical History:   Procedure Laterality Date    COLONOSCOPY      NE EGD TRANSORAL BIOPSY SINGLE/MULTIPLE N/A 10/5/2017    Dr Taylor-w/dilation over wire, 48 [place]-esophageal stricture-Faith (-) chronic inflammation    TONSILLECTOMY AND ADENOIDECTOMY         Social History     Social History    Marital status:      Spouse name: N/A    Number of children: N/A    Years of education: N/A     Social History Main Topics    Smoking status: Never Smoker    Smokeless tobacco: Never Used    Alcohol use No    Drug use: No    Sexual activity: Not Asked     Other Topics Concern    None     Social History Narrative    None       Physical Exam   Constitutional: He is oriented to person, place, and time. He appears well-developed and well-nourished. HENT:   Head: Normocephalic and atraumatic. Eyes: Conjunctivae are normal. No scleral icterus. Neck: Carotid bruit is not present. Cardiovascular: Normal rate, regular rhythm and normal heart sounds. Exam reveals no friction rub. No murmur heard. Pulmonary/Chest: Effort normal and breath sounds normal. No respiratory distress. He has no wheezes.    Abdominal: Soft. Bowel sounds are normal. He exhibits no distension and no mass. There is no tenderness. There is no rebound and no guarding. Musculoskeletal: Normal range of motion. Lymphadenopathy:        Right cervical: No superficial cervical and no posterior cervical adenopathy present. Left cervical: No superficial cervical and no posterior cervical adenopathy present. Neurological: He is alert and oriented to person, place, and time. No cranial nerve deficit. Skin: Skin is warm. No rash noted. No erythema. Psychiatric: He has a normal mood and affect. Thought content normal.       Assessment    ICD-10-CM ICD-9-CM    1. Type 2 diabetes mellitus without complication, without long-term current use of insulin ([facility]) E11.9 250.00 Will start Semaglutide 0.25 or 0.5 MG/DOSE SOPN Start 0.25 for 4 weeks then increase to 0.5mg weekly. Patient voices understanding. 2. Need for prophylactic vaccination against Streptococcus pneumoniae (pneumococcus) Z23 V03.82 Pneumococcal polysaccharide vaccine 23-valent PPSV23   3. Need for influenza vaccination Z23 V04.81 INFLUENZA, QUADV, 3 YRS AND OLDER, IM, PF, PREFILL SYR OR SDV, 0.5ML (FLUZONE QUADV, PF)           Plan      Orders Placed This Encounter   Medications    Semaglutide 0.25 or 0.5 MG/DOSE SOPN     Sig: Inject 0.5 mg into the skin once a week     Dispense:  2 pen     Refill:  0       Orders Placed This Encounter   Procedures    Pneumococcal polysaccharide vaccine 23-valent PPSV23    INFLUENZA, QUADV, 3 YRS AND OLDER, IM, PF, PREFILL SYR OR SDV, 0.5ML (FLUZONE QUADV, PF)        Return in about 2 months (around 12/1/2018) for DM. Liz Bell There are no Patient Instructions on file for this visit.

## 2018-11-06 DIAGNOSIS — E11.9 TYPE 2 DIABETES MELLITUS WITHOUT COMPLICATION, WITHOUT LONG-TERM CURRENT USE OF INSULIN (HCC): ICD-10-CM

## 2018-11-06 RX ORDER — SEMAGLUTIDE 1.34 MG/ML
0.5 INJECTION, SOLUTION SUBCUTANEOUS WEEKLY
Qty: 1.5 PEN | Refills: 0 | Status: SHIPPED | OUTPATIENT
Start: 2018-11-06 | End: 2018-12-03

## 2018-12-03 ENCOUNTER — OFFICE VISIT (OUTPATIENT)
Dept: PRIMARY CARE CLINIC | Age: 38
End: 2018-12-03
Payer: COMMERCIAL

## 2018-12-03 VITALS
HEART RATE: 96 BPM | OXYGEN SATURATION: 97 % | WEIGHT: 315 LBS | BODY MASS INDEX: 39.17 KG/M2 | TEMPERATURE: 97 F | SYSTOLIC BLOOD PRESSURE: 124 MMHG | DIASTOLIC BLOOD PRESSURE: 86 MMHG | HEIGHT: 75 IN

## 2018-12-03 DIAGNOSIS — E11.9 TYPE 2 DIABETES MELLITUS WITHOUT COMPLICATION, WITHOUT LONG-TERM CURRENT USE OF INSULIN (HCC): Primary | ICD-10-CM

## 2018-12-03 DIAGNOSIS — K30 INDIGESTION: ICD-10-CM

## 2018-12-03 DIAGNOSIS — E11.9 TYPE 2 DIABETES MELLITUS WITHOUT COMPLICATION, WITHOUT LONG-TERM CURRENT USE OF INSULIN (HCC): ICD-10-CM

## 2018-12-03 DIAGNOSIS — R53.83 FATIGUE, UNSPECIFIED TYPE: ICD-10-CM

## 2018-12-03 LAB
ALBUMIN SERPL-MCNC: 4 G/DL (ref 3.5–5.2)
ALP BLD-CCNC: 68 U/L (ref 40–130)
ALT SERPL-CCNC: 26 U/L (ref 5–41)
ANION GAP SERPL CALCULATED.3IONS-SCNC: 15 MMOL/L (ref 7–19)
AST SERPL-CCNC: 13 U/L (ref 5–40)
BILIRUB SERPL-MCNC: 0.7 MG/DL (ref 0.2–1.2)
BUN BLDV-MCNC: 9 MG/DL (ref 6–20)
CALCIUM SERPL-MCNC: 9.8 MG/DL (ref 8.6–10)
CHLORIDE BLD-SCNC: 100 MMOL/L (ref 98–111)
CHOLESTEROL, TOTAL: 231 MG/DL (ref 160–199)
CO2: 26 MMOL/L (ref 22–29)
CREAT SERPL-MCNC: 0.8 MG/DL (ref 0.5–1.2)
GFR NON-AFRICAN AMERICAN: >60
GLUCOSE BLD-MCNC: 372 MG/DL (ref 74–109)
HBA1C MFR BLD: 11.1 % (ref 4–6)
HDLC SERPL-MCNC: 39 MG/DL (ref 55–121)
LDL CHOLESTEROL CALCULATED: 118 MG/DL
POTASSIUM SERPL-SCNC: 4.2 MMOL/L (ref 3.5–5)
SODIUM BLD-SCNC: 141 MMOL/L (ref 136–145)
TOTAL PROTEIN: 7.7 G/DL (ref 6.6–8.7)
TRIGL SERPL-MCNC: 369 MG/DL (ref 0–149)
TSH SERPL DL<=0.05 MIU/L-ACNC: 2.61 UIU/ML (ref 0.27–4.2)

## 2018-12-03 PROCEDURE — 99213 OFFICE O/P EST LOW 20 MIN: CPT | Performed by: FAMILY MEDICINE

## 2018-12-03 RX ORDER — SILDENAFIL 100 MG/1
100 TABLET, FILM COATED ORAL DAILY PRN
Qty: 10 TABLET | Refills: 0 | Status: ON HOLD | OUTPATIENT
Start: 2018-12-03 | End: 2019-07-08

## 2018-12-03 RX ORDER — PANTOPRAZOLE SODIUM 40 MG/1
40 TABLET, DELAYED RELEASE ORAL
Qty: 30 TABLET | Refills: 5 | Status: SHIPPED | OUTPATIENT
Start: 2018-12-03 | End: 2019-01-12

## 2018-12-03 ASSESSMENT — ENCOUNTER SYMPTOMS
BLOOD IN STOOL: 0
VOMITING: 0
EYE PAIN: 0
NAUSEA: 0
SHORTNESS OF BREATH: 0
COLOR CHANGE: 0
ANAL BLEEDING: 0
WHEEZING: 0

## 2018-12-03 NOTE — PROGRESS NOTES
Nikhil Harris is a 41 y.o. male    Chief Complaint   Patient presents with    Follow-up     2 months    Diabetes       HPI  Nikhil Harris presents to the office for follow up of DM. Patient states he has been tolerating Ozempic. He does feel nauseated at time if he eats too much. His blood sugars have been improving. His fasting blood sugar has decreased to 200's. Patient also complains of erectile dysfunction. Erectile Dysfunction: Patient complains of erectile dysfunction. Onset of dysfunction was several months ago and was gradual in onset. Patient states the nature of difficulty is both attaining and maintaining erection. Full erections occur never. Partial erections occur with intercourse. No previous treatment. Review of Systems   Constitutional: Negative for activity change and appetite change. Eyes: Negative for pain. Respiratory: Negative for shortness of breath and wheezing. Cardiovascular: Negative for chest pain and leg swelling. Gastrointestinal: Negative for anal bleeding, blood in stool, nausea and vomiting. Genitourinary:        Erectile dysfunction     Skin: Negative for color change and rash. Neurological: Negative for seizures and headaches. Prior to Admission medications    Medication Sig Start Date End Date Taking?  Authorizing Provider   Semaglutdiego ARMANDO [facility]) 1 MG/DOSE SOPN Inject 1 mg into the skin once a week 12/3/18  Yes Leroy Miller MD   pantoprazole (PROTONIX) 40 MG tablet Take 1 tablet by mouth every morning (before breakfast) 12/3/18  Yes Leroy Miller MD   sildenafil (VIAGRA) 100 MG tablet Take 1 tablet by mouth daily as needed for Erectile Dysfunction 12/3/18  Yes Leroy Miller MD   [facility] ULTRA TEST strip TEST FASTING DAILY 9/23/18  Yes Leroy Miller MD   Insulin Degludec (TRESIBA FLEXTOUCH) 200 UNIT/ML SOPN Inject 20 Units into the skin nightly 7/17/18  Yes ELENA Reeves   Insulin Pen Needle (Arla Babinski

## 2018-12-05 ENCOUNTER — TELEPHONE (OUTPATIENT)
Dept: PRIMARY CARE CLINIC | Age: 38
End: 2018-12-05

## 2018-12-05 NOTE — TELEPHONE ENCOUNTER
This [facility] called and left a message giving the patient their lab results.

## 2018-12-05 NOTE — TELEPHONE ENCOUNTER
----- Message from Paresh Correa MD sent at 12/4/2018  9:15 AM CST -----  Please inform patient of abnormal test results. Monitor blood sugar clsoely. Hgb A1c is improving slightly down to 11.1. From 11.7. CMP normal. Cholesterol is elevated.  TSH normal

## 2018-12-18 ENCOUNTER — OFFICE VISIT (OUTPATIENT)
Dept: PRIMARY CARE CLINIC | Age: 38
End: 2018-12-18
Payer: COMMERCIAL

## 2018-12-18 VITALS
BODY MASS INDEX: 39.17 KG/M2 | SYSTOLIC BLOOD PRESSURE: 130 MMHG | HEIGHT: 75 IN | WEIGHT: 315 LBS | TEMPERATURE: 96.8 F | OXYGEN SATURATION: 99 % | HEART RATE: 99 BPM | DIASTOLIC BLOOD PRESSURE: 80 MMHG

## 2018-12-18 DIAGNOSIS — J32.9 RHINOSINUSITIS: Primary | ICD-10-CM

## 2018-12-18 DIAGNOSIS — J31.0 RHINOSINUSITIS: Primary | ICD-10-CM

## 2018-12-18 PROCEDURE — 96372 THER/PROPH/DIAG INJ SC/IM: CPT | Performed by: NURSE PRACTITIONER

## 2018-12-18 PROCEDURE — 99213 OFFICE O/P EST LOW 20 MIN: CPT | Performed by: NURSE PRACTITIONER

## 2018-12-18 RX ORDER — METHYLPREDNISOLONE ACETATE 40 MG/ML
40 INJECTION, SUSPENSION INTRA-ARTICULAR; INTRALESIONAL; INTRAMUSCULAR; SOFT TISSUE ONCE
Status: COMPLETED | OUTPATIENT
Start: 2018-12-18 | End: 2018-12-18

## 2018-12-18 RX ORDER — AMOXICILLIN AND CLAVULANATE POTASSIUM 875; 125 MG/1; MG/1
1 TABLET, FILM COATED ORAL 2 TIMES DAILY
Qty: 14 TABLET | Refills: 0 | Status: SHIPPED | OUTPATIENT
Start: 2018-12-18 | End: 2018-12-25

## 2018-12-18 RX ADMIN — METHYLPREDNISOLONE ACETATE 40 MG: 40 INJECTION, SUSPENSION INTRA-ARTICULAR; INTRALESIONAL; INTRAMUSCULAR; SOFT TISSUE at 10:38

## 2018-12-18 ASSESSMENT — ENCOUNTER SYMPTOMS
SINUS PAIN: 1
SHORTNESS OF BREATH: 0
EYE PAIN: 1
DIARRHEA: 0
EYE REDNESS: 1
WHEEZING: 0
COUGH: 1
SINUS PRESSURE: 1
RHINORRHEA: 1
VOMITING: 0
NAUSEA: 0

## 2018-12-18 NOTE — PROGRESS NOTES
[address] [place] [address], [facility]     Phone:  [phone]  Fax:  [phone]      Nikhil Harris is a 41 y.o. male who presents today for hismedical conditions/complaints as noted below. Nikhil Harris is c/o of Headache (stopped last night) and Eye Pain (left)      Chief Complaint   Patient presents with    Headache     stopped last night    Eye Pain     left       HPI:     HPI    Nikhil Harris presents today for headaches, possible migraines and left eye pain. He has also had eye pain, redness, cough, congestion, sinus pain, and pressure. He does not have a headache today. He has taken Zyrtec, Flonase, and Neti Pot. This has not helped. He denies fever. He has not been around anyone else that has had an illness like this.      Past Medical History:   Diagnosis Date    Diabetes mellitus ([address]     Hyperlipidemia     Sleep apnea     no longer present with weight loss 10-5-17        Past Surgical History:   Procedure Laterality Date    COLONOSCOPY      MD EGD TRANSORAL BIOPSY SINGLE/MULTIPLE N/A 10/5/2017    Dr Taylor-w/dilation over wire, 48 [place]-esophageal stricture-Faith (-) chronic inflammation    TONSILLECTOMY AND ADENOIDECTOMY         Social History   Substance Use Topics    Smoking status: Never Smoker    Smokeless tobacco: Never Used    Alcohol use No        Current Outpatient Prescriptions   Medication Sig Dispense Refill    amoxicillin-clavulanate (AUGMENTIN) 875-125 MG per tablet Take 1 tablet by mouth 2 times daily for 7 days 14 tablet 0    Semaglutide (OZEMPIC) 1 MG/DOSE SOPN Inject 1 mg into the skin once a week 2 pen 3    sildenafil (VIAGRA) 100 MG tablet Take 1 tablet by mouth daily as needed for Erectile Dysfunction 10 tablet 0    ONE TOUCH ULTRA TEST strip TEST FASTING DAILY 100 strip 0    Insulin Pen Needle ([employer] PEN NEEDLES 31G) 31G X 8 MM MISC 1 each by Does not apply route daily 100 each 3    pregabalin (LYRICA) 50 MG capsule Take 1 capsule by mouth 2

## 2019-01-12 ENCOUNTER — APPOINTMENT (OUTPATIENT)
Dept: GENERAL RADIOLOGY | Age: 39
End: 2019-01-12
Payer: COMMERCIAL

## 2019-01-12 ENCOUNTER — OFFICE VISIT (OUTPATIENT)
Dept: URGENT CARE | Age: 39
End: 2019-01-12
Payer: COMMERCIAL

## 2019-01-12 ENCOUNTER — HOSPITAL ENCOUNTER (EMERGENCY)
Age: 39
Discharge: HOME | End: 2019-01-12
Payer: COMMERCIAL

## 2019-01-12 VITALS
RESPIRATION RATE: 15 BRPM | BODY MASS INDEX: 39.17 KG/M2 | HEIGHT: 75 IN | HEART RATE: 97 BPM | SYSTOLIC BLOOD PRESSURE: 140 MMHG | WEIGHT: 315 LBS | DIASTOLIC BLOOD PRESSURE: 93 MMHG | TEMPERATURE: 98.1 F | OXYGEN SATURATION: 97 %

## 2019-01-12 VITALS
BODY MASS INDEX: 41.62 KG/M2 | HEART RATE: 95 BPM | DIASTOLIC BLOOD PRESSURE: 78 MMHG | OXYGEN SATURATION: 98 % | RESPIRATION RATE: 18 BRPM | SYSTOLIC BLOOD PRESSURE: 120 MMHG | WEIGHT: 315 LBS | TEMPERATURE: 97.9 F

## 2019-01-12 DIAGNOSIS — L97.521 DIABETIC ULCER OF TOE OF LEFT FOOT ASSOCIATED WITH DIABETES MELLITUS DUE TO UNDERLYING CONDITION, LIMITED TO BREAKDOWN OF SKIN (HCC): Primary | ICD-10-CM

## 2019-01-12 DIAGNOSIS — E08.621 DIABETIC ULCER OF TOE OF LEFT FOOT ASSOCIATED WITH DIABETES MELLITUS DUE TO UNDERLYING CONDITION, LIMITED TO BREAKDOWN OF SKIN (HCC): Primary | ICD-10-CM

## 2019-01-12 DIAGNOSIS — L03.032 CELLULITIS OF SECOND TOE OF LEFT FOOT: Primary | ICD-10-CM

## 2019-01-12 DIAGNOSIS — E11.628 TYPE 2 DIABETES MELLITUS WITH OTHER SKIN COMPLICATION, WITH LONG-TERM CURRENT USE OF INSULIN (HCC): ICD-10-CM

## 2019-01-12 DIAGNOSIS — Z79.4 TYPE 2 DIABETES MELLITUS WITH OTHER SKIN COMPLICATION, WITH LONG-TERM CURRENT USE OF INSULIN (HCC): ICD-10-CM

## 2019-01-12 LAB
ALBUMIN SERPL-MCNC: 3.9 G/DL (ref 3.5–5.2)
ALP BLD-CCNC: 67 U/L (ref 40–130)
ALT SERPL-CCNC: 18 U/L (ref 5–41)
ANION GAP SERPL CALCULATED.3IONS-SCNC: 11 MMOL/L (ref 7–19)
AST SERPL-CCNC: 12 U/L (ref 5–40)
BASOPHILS ABSOLUTE: 0 K/UL (ref 0–0.2)
BASOPHILS RELATIVE PERCENT: 0.3 % (ref 0–1)
BILIRUB SERPL-MCNC: 0.7 MG/DL (ref 0.2–1.2)
BUN BLDV-MCNC: 10 MG/DL (ref 6–20)
CALCIUM SERPL-MCNC: 9.1 MG/DL (ref 8.6–10)
CHLORIDE BLD-SCNC: 103 MMOL/L (ref 98–111)
CO2: 25 MMOL/L (ref 22–29)
CREAT SERPL-MCNC: 0.6 MG/DL (ref 0.5–1.2)
D DIMER: <0.27 UG/ML FEU (ref 0–0.48)
EOSINOPHILS ABSOLUTE: 0.2 K/UL (ref 0–0.6)
EOSINOPHILS RELATIVE PERCENT: 2.9 % (ref 0–5)
GFR NON-AFRICAN AMERICAN: >60
GLUCOSE BLD-MCNC: 302 MG/DL (ref 74–109)
HCT VFR BLD CALC: 43.8 % (ref 42–52)
HEMOGLOBIN: 15.4 G/DL (ref 14–18)
LACTIC ACID, SEPSIS: 2.2 MG/DL (ref 0.5–1.9)
LYMPHOCYTES ABSOLUTE: 1.9 K/UL (ref 1.1–4.5)
LYMPHOCYTES RELATIVE PERCENT: 24.2 % (ref 20–40)
MCH RBC QN AUTO: 32.1 PG (ref 27–31)
MCHC RBC AUTO-ENTMCNC: 35.2 G/DL (ref 33–37)
MCV RBC AUTO: 91.3 FL (ref 80–94)
MONOCYTES ABSOLUTE: 0.4 K/UL (ref 0–0.9)
MONOCYTES RELATIVE PERCENT: 5.6 % (ref 0–10)
NEUTROPHILS ABSOLUTE: 5.2 K/UL (ref 1.5–7.5)
NEUTROPHILS RELATIVE PERCENT: 66.7 % (ref 50–65)
PDW BLD-RTO: 11.9 % (ref 11.5–14.5)
PLATELET # BLD: 167 K/UL (ref 130–400)
PMV BLD AUTO: 9.7 FL (ref 9.4–12.4)
POTASSIUM SERPL-SCNC: 4.4 MMOL/L (ref 3.5–5)
RBC # BLD: 4.8 M/UL (ref 4.7–6.1)
SODIUM BLD-SCNC: 139 MMOL/L (ref 136–145)
TOTAL PROTEIN: 7.4 G/DL (ref 6.6–8.7)
WBC # BLD: 7.8 K/UL (ref 4.8–10.8)

## 2019-01-12 PROCEDURE — 83605 ASSAY OF LACTIC ACID: CPT

## 2019-01-12 PROCEDURE — 87205 SMEAR GRAM STAIN: CPT

## 2019-01-12 PROCEDURE — 73630 X-RAY EXAM OF FOOT: CPT

## 2019-01-12 PROCEDURE — 87186 SC STD MICRODIL/AGAR DIL: CPT

## 2019-01-12 PROCEDURE — 80053 COMPREHEN METABOLIC PANEL: CPT

## 2019-01-12 PROCEDURE — 87070 CULTURE OTHR SPECIMN AEROBIC: CPT

## 2019-01-12 PROCEDURE — 85025 COMPLETE CBC W/AUTO DIFF WBC: CPT

## 2019-01-12 PROCEDURE — 87040 BLOOD CULTURE FOR BACTERIA: CPT

## 2019-01-12 PROCEDURE — 99283 EMERGENCY DEPT VISIT LOW MDM: CPT

## 2019-01-12 PROCEDURE — 96365 THER/PROPH/DIAG IV INF INIT: CPT

## 2019-01-12 PROCEDURE — 85379 FIBRIN DEGRADATION QUANT: CPT

## 2019-01-12 PROCEDURE — 2580000003 HC RX 258: Performed by: PHYSICIAN ASSISTANT

## 2019-01-12 PROCEDURE — 99284 EMERGENCY DEPT VISIT MOD MDM: CPT | Performed by: PHYSICIAN ASSISTANT

## 2019-01-12 PROCEDURE — 96375 TX/PRO/DX INJ NEW DRUG ADDON: CPT

## 2019-01-12 PROCEDURE — 99213 OFFICE O/P EST LOW 20 MIN: CPT | Performed by: NURSE PRACTITIONER

## 2019-01-12 PROCEDURE — 36415 COLL VENOUS BLD VENIPUNCTURE: CPT

## 2019-01-12 PROCEDURE — 6360000002 HC RX W HCPCS: Performed by: PHYSICIAN ASSISTANT

## 2019-01-12 RX ORDER — SULFAMETHOXAZOLE AND TRIMETHOPRIM 800; 160 MG/1; MG/1
1 TABLET ORAL 2 TIMES DAILY
Qty: 20 TABLET | Refills: 0 | Status: SHIPPED | OUTPATIENT
Start: 2019-01-12 | End: 2019-01-22

## 2019-01-12 RX ORDER — CLONIDINE HYDROCHLORIDE 0.1 MG/1
0.1 TABLET ORAL ONCE
Status: DISCONTINUED | OUTPATIENT
Start: 2019-01-12 | End: 2019-01-12 | Stop reason: HOSPADM

## 2019-01-12 RX ADMIN — TAZOBACTAM SODIUM AND PIPERACILLIN SODIUM 3.38 G: 375; 3 INJECTION, SOLUTION INTRAVENOUS at 12:06

## 2019-01-12 RX ADMIN — CEFAZOLIN 1 G: 1 INJECTION, POWDER, FOR SOLUTION INTRAMUSCULAR; INTRAVENOUS at 11:41

## 2019-01-12 ASSESSMENT — PAIN SCALES - GENERAL: PAINLEVEL_OUTOF10: 0

## 2019-01-12 ASSESSMENT — ENCOUNTER SYMPTOMS
ALLERGIC/IMMUNOLOGIC NEGATIVE: 1
RHINORRHEA: 0
DIARRHEA: 0
COUGH: 0
NAUSEA: 0
BACK PAIN: 0
WHEEZING: 0
VOMITING: 0
CHEST TIGHTNESS: 0
GASTROINTESTINAL NEGATIVE: 1
ABDOMINAL PAIN: 0
EYES NEGATIVE: 1
CHOKING: 0
APNEA: 0
STRIDOR: 0
COLOR CHANGE: 1
SHORTNESS OF BREATH: 0
RESPIRATORY NEGATIVE: 1

## 2019-01-15 LAB
GRAM STAIN RESULT: ABNORMAL
ORGANISM: ABNORMAL
ORGANISM: ABNORMAL
WOUND/ABSCESS: ABNORMAL

## 2019-01-17 ENCOUNTER — OFFICE VISIT (OUTPATIENT)
Dept: PRIMARY CARE CLINIC | Age: 39
End: 2019-01-17
Payer: COMMERCIAL

## 2019-01-17 VITALS
WEIGHT: 315 LBS | HEIGHT: 75 IN | OXYGEN SATURATION: 96 % | SYSTOLIC BLOOD PRESSURE: 152 MMHG | DIASTOLIC BLOOD PRESSURE: 104 MMHG | HEART RATE: 106 BPM | BODY MASS INDEX: 39.17 KG/M2 | TEMPERATURE: 96.7 F

## 2019-01-17 DIAGNOSIS — L97.521 DIABETIC ULCER OF TOE OF LEFT FOOT ASSOCIATED WITH DIABETES MELLITUS DUE TO UNDERLYING CONDITION, LIMITED TO BREAKDOWN OF SKIN (HCC): Primary | ICD-10-CM

## 2019-01-17 DIAGNOSIS — E08.621 DIABETIC ULCER OF TOE OF LEFT FOOT ASSOCIATED WITH DIABETES MELLITUS DUE TO UNDERLYING CONDITION, LIMITED TO BREAKDOWN OF SKIN (HCC): Primary | ICD-10-CM

## 2019-01-17 DIAGNOSIS — I15.9 SECONDARY HYPERTENSION: ICD-10-CM

## 2019-01-17 LAB
BLOOD CULTURE, ROUTINE: NORMAL
CULTURE, BLOOD 2: NORMAL

## 2019-01-17 PROCEDURE — 99214 OFFICE O/P EST MOD 30 MIN: CPT | Performed by: NURSE PRACTITIONER

## 2019-01-18 ENCOUNTER — TELEPHONE (OUTPATIENT)
Dept: PRIMARY CARE CLINIC | Age: 39
End: 2019-01-18

## 2019-01-18 ASSESSMENT — ENCOUNTER SYMPTOMS
COUGH: 0
SHORTNESS OF BREATH: 0
WHEEZING: 0

## 2019-01-22 ENCOUNTER — HOSPITAL ENCOUNTER (OUTPATIENT)
Dept: WOUND CARE | Age: 39
Discharge: HOME | End: 2019-01-22
Payer: COMMERCIAL

## 2019-01-22 VITALS
SYSTOLIC BLOOD PRESSURE: 154 MMHG | WEIGHT: 315 LBS | DIASTOLIC BLOOD PRESSURE: 80 MMHG | RESPIRATION RATE: 18 BRPM | HEIGHT: 75 IN | BODY MASS INDEX: 39.17 KG/M2 | TEMPERATURE: 97.5 F | HEART RATE: 78 BPM

## 2019-01-22 DIAGNOSIS — L97.522 DIABETIC ULCER OF TOE OF LEFT FOOT ASSOCIATED WITH TYPE 2 DIABETES MELLITUS, WITH FAT LAYER EXPOSED (HCC): ICD-10-CM

## 2019-01-22 DIAGNOSIS — E11.621 DIABETIC ULCER OF TOE OF LEFT FOOT ASSOCIATED WITH TYPE 2 DIABETES MELLITUS, WITH FAT LAYER EXPOSED (HCC): ICD-10-CM

## 2019-01-22 DIAGNOSIS — L03.032 CELLULITIS OF LEFT TOE: ICD-10-CM

## 2019-01-22 DIAGNOSIS — L97.509 NON-PRESSURE ULCER OF TOE (HCC): ICD-10-CM

## 2019-01-22 PROCEDURE — 99214 OFFICE O/P EST MOD 30 MIN: CPT | Performed by: NURSE PRACTITIONER

## 2019-01-22 PROCEDURE — 99213 OFFICE O/P EST LOW 20 MIN: CPT

## 2019-01-22 RX ORDER — DOXYCYCLINE HYCLATE 100 MG
100 TABLET ORAL 2 TIMES DAILY
Qty: 20 TABLET | Refills: 0 | Status: SHIPPED | OUTPATIENT
Start: 2019-01-22 | End: 2019-02-01

## 2019-01-25 ENCOUNTER — OFFICE VISIT (OUTPATIENT)
Dept: PRIMARY CARE CLINIC | Age: 39
End: 2019-01-25
Payer: COMMERCIAL

## 2019-01-25 VITALS
WEIGHT: 315 LBS | SYSTOLIC BLOOD PRESSURE: 164 MMHG | TEMPERATURE: 97.5 F | BODY MASS INDEX: 39.17 KG/M2 | DIASTOLIC BLOOD PRESSURE: 110 MMHG | OXYGEN SATURATION: 98 % | HEIGHT: 75 IN | HEART RATE: 94 BPM

## 2019-01-25 DIAGNOSIS — E11.621 TYPE 2 DIABETES MELLITUS WITH FOOT ULCER, WITHOUT LONG-TERM CURRENT USE OF INSULIN (HCC): ICD-10-CM

## 2019-01-25 DIAGNOSIS — E11.621 DIABETIC ULCER OF TOE OF LEFT FOOT ASSOCIATED WITH TYPE 2 DIABETES MELLITUS, WITH FAT LAYER EXPOSED (HCC): ICD-10-CM

## 2019-01-25 DIAGNOSIS — L97.522 DIABETIC ULCER OF TOE OF LEFT FOOT ASSOCIATED WITH TYPE 2 DIABETES MELLITUS, WITH FAT LAYER EXPOSED (HCC): ICD-10-CM

## 2019-01-25 DIAGNOSIS — L97.509 TYPE 2 DIABETES MELLITUS WITH FOOT ULCER, WITHOUT LONG-TERM CURRENT USE OF INSULIN (HCC): ICD-10-CM

## 2019-01-25 DIAGNOSIS — I10 ESSENTIAL HYPERTENSION: Primary | ICD-10-CM

## 2019-01-25 PROCEDURE — 99214 OFFICE O/P EST MOD 30 MIN: CPT | Performed by: NURSE PRACTITIONER

## 2019-01-25 RX ORDER — LISINOPRIL 10 MG/1
10 TABLET ORAL DAILY
Qty: 30 TABLET | Refills: 0 | Status: SHIPPED | OUTPATIENT
Start: 2019-01-25 | End: 2019-02-22 | Stop reason: SDUPTHER

## 2019-01-25 ASSESSMENT — ENCOUNTER SYMPTOMS
BACK PAIN: 0
NAUSEA: 0
WHEEZING: 0
DIARRHEA: 0
VOMITING: 0
ABDOMINAL PAIN: 0
COUGH: 0
SHORTNESS OF BREATH: 0

## 2019-01-29 ENCOUNTER — HOSPITAL ENCOUNTER (OUTPATIENT)
Dept: WOUND CARE | Age: 39
Discharge: HOME | End: 2019-01-29
Payer: COMMERCIAL

## 2019-01-29 ENCOUNTER — HOSPITAL ENCOUNTER (OUTPATIENT)
Dept: NON INVASIVE DIAGNOSTICS | Age: 39
Discharge: HOME | End: 2019-01-29
Payer: COMMERCIAL

## 2019-01-29 VITALS
BODY MASS INDEX: 39.17 KG/M2 | HEIGHT: 75 IN | RESPIRATION RATE: 18 BRPM | WEIGHT: 315 LBS | TEMPERATURE: 98.5 F | DIASTOLIC BLOOD PRESSURE: 93 MMHG | SYSTOLIC BLOOD PRESSURE: 161 MMHG | HEART RATE: 110 BPM

## 2019-01-29 DIAGNOSIS — L03.032 CELLULITIS OF LEFT TOE: ICD-10-CM

## 2019-01-29 DIAGNOSIS — E11.65 UNCONTROLLED TYPE 2 DIABETES MELLITUS WITH HYPERGLYCEMIA (HCC): Chronic | ICD-10-CM

## 2019-01-29 DIAGNOSIS — L97.522 DIABETIC ULCER OF TOE OF LEFT FOOT ASSOCIATED WITH TYPE 2 DIABETES MELLITUS, WITH FAT LAYER EXPOSED (HCC): ICD-10-CM

## 2019-01-29 DIAGNOSIS — L97.509 NON-PRESSURE ULCER OF TOE (HCC): ICD-10-CM

## 2019-01-29 DIAGNOSIS — E66.01 CLASS 3 SEVERE OBESITY DUE TO EXCESS CALORIES WITH SERIOUS COMORBIDITY AND BODY MASS INDEX (BMI) OF 40.0 TO 44.9 IN ADULT (HCC): Chronic | ICD-10-CM

## 2019-01-29 DIAGNOSIS — E11.621 DIABETIC ULCER OF TOE OF LEFT FOOT ASSOCIATED WITH TYPE 2 DIABETES MELLITUS, WITH FAT LAYER EXPOSED (HCC): ICD-10-CM

## 2019-01-29 PROCEDURE — 93923 UPR/LXTR ART STDY 3+ LVLS: CPT

## 2019-01-29 PROCEDURE — 11044 DBRDMT BONE 1ST 20 SQ CM/<: CPT

## 2019-01-29 PROCEDURE — 11044 DBRDMT BONE 1ST 20 SQ CM/<: CPT | Performed by: SURGERY

## 2019-01-30 ENCOUNTER — HOSPITAL ENCOUNTER (OUTPATIENT)
Dept: WOUND CARE | Age: 39
Discharge: HOME | End: 2019-01-30
Payer: COMMERCIAL

## 2019-01-30 ENCOUNTER — HOSPITAL ENCOUNTER (OUTPATIENT)
Dept: PREADMISSION TESTING | Age: 39
Setting detail: OUTPATIENT SURGERY
Discharge: HOME | End: 2019-02-03
Payer: COMMERCIAL

## 2019-01-30 VITALS
TEMPERATURE: 97.1 F | HEIGHT: 75 IN | SYSTOLIC BLOOD PRESSURE: 150 MMHG | HEART RATE: 94 BPM | WEIGHT: 315 LBS | DIASTOLIC BLOOD PRESSURE: 98 MMHG | RESPIRATION RATE: 18 BRPM | BODY MASS INDEX: 39.17 KG/M2

## 2019-01-30 VITALS — WEIGHT: 315 LBS | BODY MASS INDEX: 38.36 KG/M2 | HEIGHT: 76 IN

## 2019-01-30 DIAGNOSIS — L97.509 NON-PRESSURE ULCER OF TOE (HCC): ICD-10-CM

## 2019-01-30 DIAGNOSIS — L03.032 CELLULITIS OF LEFT TOE: ICD-10-CM

## 2019-01-30 DIAGNOSIS — E11.621 DIABETIC ULCER OF TOE OF LEFT FOOT ASSOCIATED WITH TYPE 2 DIABETES MELLITUS, WITH FAT LAYER EXPOSED (HCC): ICD-10-CM

## 2019-01-30 DIAGNOSIS — L97.522 DIABETIC ULCER OF TOE OF LEFT FOOT ASSOCIATED WITH TYPE 2 DIABETES MELLITUS, WITH FAT LAYER EXPOSED (HCC): ICD-10-CM

## 2019-01-30 LAB
EKG P AXIS: 45 DEGREES
EKG P-R INTERVAL: 142 MS
EKG Q-T INTERVAL: 376 MS
EKG QRS DURATION: 100 MS
EKG QTC CALCULATION (BAZETT): 428 MS
EKG T AXIS: 51 DEGREES

## 2019-01-30 PROCEDURE — 99213 OFFICE O/P EST LOW 20 MIN: CPT

## 2019-01-30 PROCEDURE — 99214 OFFICE O/P EST MOD 30 MIN: CPT | Performed by: NURSE PRACTITIONER

## 2019-01-30 PROCEDURE — 93005 ELECTROCARDIOGRAM TRACING: CPT

## 2019-01-30 ASSESSMENT — PAIN SCALES - GENERAL: PAINLEVEL_OUTOF10: 0

## 2019-01-31 ENCOUNTER — ANESTHESIA (OUTPATIENT)
Dept: OPERATING ROOM | Age: 39
End: 2019-01-31
Payer: COMMERCIAL

## 2019-01-31 ENCOUNTER — HOSPITAL ENCOUNTER (OUTPATIENT)
Age: 39
Setting detail: OUTPATIENT SURGERY
Discharge: HOME | End: 2019-01-31
Attending: SURGERY | Admitting: SURGERY
Payer: COMMERCIAL

## 2019-01-31 ENCOUNTER — PREP FOR PROCEDURE (OUTPATIENT)
Dept: WOUND CARE | Age: 39
End: 2019-01-31

## 2019-01-31 ENCOUNTER — ANESTHESIA EVENT (OUTPATIENT)
Dept: OPERATING ROOM | Age: 39
End: 2019-01-31
Payer: COMMERCIAL

## 2019-01-31 VITALS
RESPIRATION RATE: 14 BRPM | SYSTOLIC BLOOD PRESSURE: 144 MMHG | DIASTOLIC BLOOD PRESSURE: 91 MMHG | WEIGHT: 315 LBS | TEMPERATURE: 97.2 F | HEIGHT: 76 IN | BODY MASS INDEX: 38.36 KG/M2 | OXYGEN SATURATION: 98 % | HEART RATE: 81 BPM

## 2019-01-31 VITALS
RESPIRATION RATE: 11 BRPM | DIASTOLIC BLOOD PRESSURE: 62 MMHG | SYSTOLIC BLOOD PRESSURE: 109 MMHG | OXYGEN SATURATION: 94 %

## 2019-01-31 PROBLEM — M86.9 OSTEOMYELITIS OF TOE OF LEFT FOOT (HCC): Chronic | Status: ACTIVE | Noted: 2019-01-31

## 2019-01-31 LAB
GLUCOSE BLD-MCNC: 340 MG/DL (ref 70–99)
PERFORMED ON: ABNORMAL

## 2019-01-31 PROCEDURE — 2580000003 HC RX 258: Performed by: SURGERY

## 2019-01-31 PROCEDURE — 88305 TISSUE EXAM BY PATHOLOGIST: CPT

## 2019-01-31 PROCEDURE — 6360000002 HC RX W HCPCS: Performed by: NURSE ANESTHETIST, CERTIFIED REGISTERED

## 2019-01-31 PROCEDURE — 6360000002 HC RX W HCPCS: Performed by: SURGERY

## 2019-01-31 PROCEDURE — 2500000003 HC RX 250 WO HCPCS: Performed by: NURSE ANESTHETIST, CERTIFIED REGISTERED

## 2019-01-31 PROCEDURE — 2709999900 HC NON-CHARGEABLE SUPPLY: Performed by: SURGERY

## 2019-01-31 PROCEDURE — 3700000000 HC ANESTHESIA ATTENDED CARE: Performed by: SURGERY

## 2019-01-31 PROCEDURE — 3600000004 HC SURGERY LEVEL 4 BASE: Performed by: SURGERY

## 2019-01-31 PROCEDURE — 3700000001 HC ADD 15 MINUTES (ANESTHESIA): Performed by: SURGERY

## 2019-01-31 PROCEDURE — 7100000001 HC PACU RECOVERY - ADDTL 15 MIN: Performed by: SURGERY

## 2019-01-31 PROCEDURE — 28820 AMPUTATION OF TOE: CPT | Performed by: SURGERY

## 2019-01-31 PROCEDURE — 88311 DECALCIFY TISSUE: CPT

## 2019-01-31 PROCEDURE — 7100000011 HC PHASE II RECOVERY - ADDTL 15 MIN: Performed by: SURGERY

## 2019-01-31 PROCEDURE — 7100000010 HC PHASE II RECOVERY - FIRST 15 MIN: Performed by: SURGERY

## 2019-01-31 PROCEDURE — 3600000014 HC SURGERY LEVEL 4 ADDTL 15MIN: Performed by: SURGERY

## 2019-01-31 PROCEDURE — 82948 REAGENT STRIP/BLOOD GLUCOSE: CPT

## 2019-01-31 PROCEDURE — 2580000003 HC RX 258: Performed by: ANESTHESIOLOGY

## 2019-01-31 PROCEDURE — 7100000000 HC PACU RECOVERY - FIRST 15 MIN: Performed by: SURGERY

## 2019-01-31 PROCEDURE — 6360000002 HC RX W HCPCS: Performed by: ANESTHESIOLOGY

## 2019-01-31 RX ORDER — LIDOCAINE HYDROCHLORIDE 10 MG/ML
INJECTION, SOLUTION INFILTRATION; PERINEURAL PRN
Status: DISCONTINUED | OUTPATIENT
Start: 2019-01-31 | End: 2019-01-31 | Stop reason: SDUPTHER

## 2019-01-31 RX ORDER — SODIUM CHLORIDE 0.9 % (FLUSH) 0.9 %
10 SYRINGE (ML) INJECTION EVERY 12 HOURS SCHEDULED
Status: DISCONTINUED | OUTPATIENT
Start: 2019-01-31 | End: 2019-01-31 | Stop reason: HOSPADM

## 2019-01-31 RX ORDER — LABETALOL HYDROCHLORIDE 5 MG/ML
5 INJECTION, SOLUTION INTRAVENOUS EVERY 10 MIN PRN
Status: DISCONTINUED | OUTPATIENT
Start: 2019-01-31 | End: 2019-01-31 | Stop reason: HOSPADM

## 2019-01-31 RX ORDER — MORPHINE SULFATE/0.9% NACL/PF 1 MG/ML
2 SYRINGE (ML) INJECTION EVERY 5 MIN PRN
Status: DISCONTINUED | OUTPATIENT
Start: 2019-01-31 | End: 2019-01-31 | Stop reason: HOSPADM

## 2019-01-31 RX ORDER — DEXAMETHASONE SODIUM PHOSPHATE 10 MG/ML
INJECTION INTRAMUSCULAR; INTRAVENOUS PRN
Status: DISCONTINUED | OUTPATIENT
Start: 2019-01-31 | End: 2019-01-31 | Stop reason: SDUPTHER

## 2019-01-31 RX ORDER — SUCCINYLCHOLINE/SOD CL,ISO/PF 100 MG/5ML
SYRINGE (ML) INTRAVENOUS PRN
Status: DISCONTINUED | OUTPATIENT
Start: 2019-01-31 | End: 2019-01-31 | Stop reason: SDUPTHER

## 2019-01-31 RX ORDER — FENTANYL CITRATE 50 UG/ML
50 INJECTION, SOLUTION INTRAMUSCULAR; INTRAVENOUS
Status: DISCONTINUED | OUTPATIENT
Start: 2019-01-31 | End: 2019-01-31 | Stop reason: HOSPADM

## 2019-01-31 RX ORDER — SCOLOPAMINE TRANSDERMAL SYSTEM 1 MG/1
1 PATCH, EXTENDED RELEASE TRANSDERMAL ONCE
Status: DISCONTINUED | OUTPATIENT
Start: 2019-01-31 | End: 2019-01-31 | Stop reason: HOSPADM

## 2019-01-31 RX ORDER — MORPHINE SULFATE 4 MG/ML
4 INJECTION, SOLUTION INTRAMUSCULAR; INTRAVENOUS
Status: DISCONTINUED | OUTPATIENT
Start: 2019-01-31 | End: 2019-01-31 | Stop reason: HOSPADM

## 2019-01-31 RX ORDER — SODIUM CHLORIDE 0.9 % (FLUSH) 0.9 %
10 SYRINGE (ML) INJECTION PRN
Status: DISCONTINUED | OUTPATIENT
Start: 2019-01-31 | End: 2019-01-31 | Stop reason: HOSPADM

## 2019-01-31 RX ORDER — SCOLOPAMINE TRANSDERMAL SYSTEM 1 MG/1
1 PATCH, EXTENDED RELEASE TRANSDERMAL ONCE
Qty: 1 PATCH | Refills: 0 | Status: SHIPPED | OUTPATIENT
Start: 2019-01-31 | End: 2019-01-31

## 2019-01-31 RX ORDER — MIDAZOLAM HYDROCHLORIDE 1 MG/ML
2 INJECTION INTRAMUSCULAR; INTRAVENOUS
Status: DISCONTINUED | OUTPATIENT
Start: 2019-01-31 | End: 2019-01-31 | Stop reason: HOSPADM

## 2019-01-31 RX ORDER — SODIUM CHLORIDE, SODIUM LACTATE, POTASSIUM CHLORIDE, CALCIUM CHLORIDE 600; 310; 30; 20 MG/100ML; MG/100ML; MG/100ML; MG/100ML
INJECTION, SOLUTION INTRAVENOUS CONTINUOUS
Status: DISCONTINUED | OUTPATIENT
Start: 2019-01-31 | End: 2019-01-31 | Stop reason: HOSPADM

## 2019-01-31 RX ORDER — HYDROCODONE BITARTRATE AND ACETAMINOPHEN 5; 325 MG/1; MG/1
2 TABLET ORAL EVERY 6 HOURS PRN
Status: DISCONTINUED | OUTPATIENT
Start: 2019-01-31 | End: 2019-01-31 | Stop reason: HOSPADM

## 2019-01-31 RX ORDER — ONDANSETRON 2 MG/ML
INJECTION INTRAMUSCULAR; INTRAVENOUS PRN
Status: DISCONTINUED | OUTPATIENT
Start: 2019-01-31 | End: 2019-01-31 | Stop reason: SDUPTHER

## 2019-01-31 RX ORDER — DIPHENHYDRAMINE HYDROCHLORIDE 50 MG/ML
12.5 INJECTION INTRAMUSCULAR; INTRAVENOUS
Status: DISCONTINUED | OUTPATIENT
Start: 2019-01-31 | End: 2019-01-31 | Stop reason: HOSPADM

## 2019-01-31 RX ORDER — FENTANYL CITRATE 50 UG/ML
INJECTION, SOLUTION INTRAMUSCULAR; INTRAVENOUS PRN
Status: DISCONTINUED | OUTPATIENT
Start: 2019-01-31 | End: 2019-01-31 | Stop reason: SDUPTHER

## 2019-01-31 RX ORDER — METOCLOPRAMIDE HYDROCHLORIDE 5 MG/ML
10 INJECTION INTRAMUSCULAR; INTRAVENOUS
Status: COMPLETED | OUTPATIENT
Start: 2019-01-31 | End: 2019-01-31

## 2019-01-31 RX ORDER — MORPHINE SULFATE/0.9% NACL/PF 1 MG/ML
4 SYRINGE (ML) INJECTION EVERY 5 MIN PRN
Status: DISCONTINUED | OUTPATIENT
Start: 2019-01-31 | End: 2019-01-31 | Stop reason: HOSPADM

## 2019-01-31 RX ORDER — ROCURONIUM BROMIDE 10 MG/ML
INJECTION, SOLUTION INTRAVENOUS PRN
Status: DISCONTINUED | OUTPATIENT
Start: 2019-01-31 | End: 2019-01-31 | Stop reason: SDUPTHER

## 2019-01-31 RX ORDER — PROMETHAZINE HYDROCHLORIDE 25 MG/ML
6.25 INJECTION, SOLUTION INTRAMUSCULAR; INTRAVENOUS
Status: DISCONTINUED | OUTPATIENT
Start: 2019-01-31 | End: 2019-01-31 | Stop reason: HOSPADM

## 2019-01-31 RX ORDER — KETOROLAC TROMETHAMINE 30 MG/ML
INJECTION, SOLUTION INTRAMUSCULAR; INTRAVENOUS PRN
Status: DISCONTINUED | OUTPATIENT
Start: 2019-01-31 | End: 2019-01-31 | Stop reason: SDUPTHER

## 2019-01-31 RX ORDER — SODIUM CHLORIDE 0.9 % (FLUSH) 0.9 %
10 SYRINGE (ML) INJECTION EVERY 12 HOURS SCHEDULED
Status: CANCELLED | OUTPATIENT
Start: 2019-01-31

## 2019-01-31 RX ORDER — PROPOFOL 10 MG/ML
INJECTION, EMULSION INTRAVENOUS PRN
Status: DISCONTINUED | OUTPATIENT
Start: 2019-01-31 | End: 2019-01-31 | Stop reason: SDUPTHER

## 2019-01-31 RX ORDER — LIDOCAINE HYDROCHLORIDE 10 MG/ML
1 INJECTION, SOLUTION EPIDURAL; INFILTRATION; INTRACAUDAL; PERINEURAL
Status: DISCONTINUED | OUTPATIENT
Start: 2019-01-31 | End: 2019-01-31 | Stop reason: HOSPADM

## 2019-01-31 RX ORDER — SODIUM CHLORIDE 0.9 % (FLUSH) 0.9 %
10 SYRINGE (ML) INJECTION PRN
Status: CANCELLED | OUTPATIENT
Start: 2019-01-31

## 2019-01-31 RX ORDER — MEPERIDINE HYDROCHLORIDE 50 MG/ML
12.5 INJECTION INTRAMUSCULAR; INTRAVENOUS; SUBCUTANEOUS EVERY 5 MIN PRN
Status: DISCONTINUED | OUTPATIENT
Start: 2019-01-31 | End: 2019-01-31 | Stop reason: HOSPADM

## 2019-01-31 RX ORDER — HYDRALAZINE HYDROCHLORIDE 20 MG/ML
5 INJECTION INTRAMUSCULAR; INTRAVENOUS EVERY 10 MIN PRN
Status: DISCONTINUED | OUTPATIENT
Start: 2019-01-31 | End: 2019-01-31 | Stop reason: HOSPADM

## 2019-01-31 RX ADMIN — ROCURONIUM BROMIDE 10 MG: 10 INJECTION INTRAVENOUS at 07:22

## 2019-01-31 RX ADMIN — ROCURONIUM BROMIDE 40 MG: 10 INJECTION INTRAVENOUS at 07:31

## 2019-01-31 RX ADMIN — FENTANYL CITRATE 50 MCG: 50 INJECTION INTRAMUSCULAR; INTRAVENOUS at 08:16

## 2019-01-31 RX ADMIN — FENTANYL CITRATE 100 MCG: 50 INJECTION INTRAMUSCULAR; INTRAVENOUS at 07:22

## 2019-01-31 RX ADMIN — DEXAMETHASONE SODIUM PHOSPHATE 10 MG: 10 INJECTION INTRAMUSCULAR; INTRAVENOUS at 07:31

## 2019-01-31 RX ADMIN — METOCLOPRAMIDE 10 MG: 5 INJECTION, SOLUTION INTRAMUSCULAR; INTRAVENOUS at 08:42

## 2019-01-31 RX ADMIN — FENTANYL CITRATE 100 MCG: 50 INJECTION INTRAMUSCULAR; INTRAVENOUS at 07:41

## 2019-01-31 RX ADMIN — ONDANSETRON HYDROCHLORIDE 4 MG: 2 INJECTION, SOLUTION INTRAMUSCULAR; INTRAVENOUS at 07:31

## 2019-01-31 RX ADMIN — ROCURONIUM BROMIDE 20 MG: 10 INJECTION INTRAVENOUS at 07:41

## 2019-01-31 RX ADMIN — Medication 180 MG: at 07:22

## 2019-01-31 RX ADMIN — PROPOFOL 200 MG: 10 INJECTION, EMULSION INTRAVENOUS at 07:22

## 2019-01-31 RX ADMIN — VANCOMYCIN HYDROCHLORIDE 2000 MG: 1 INJECTION, POWDER, LYOPHILIZED, FOR SOLUTION INTRAVENOUS at 06:23

## 2019-01-31 RX ADMIN — HYDROMORPHONE HYDROCHLORIDE 0.75 MG: 1 INJECTION, SOLUTION INTRAMUSCULAR; INTRAVENOUS; SUBCUTANEOUS at 08:16

## 2019-01-31 RX ADMIN — HYDROMORPHONE HYDROCHLORIDE 0.25 MG: 1 INJECTION, SOLUTION INTRAMUSCULAR; INTRAVENOUS; SUBCUTANEOUS at 07:41

## 2019-01-31 RX ADMIN — SODIUM CHLORIDE, SODIUM LACTATE, POTASSIUM CHLORIDE, AND CALCIUM CHLORIDE: 600; 310; 30; 20 INJECTION, SOLUTION INTRAVENOUS at 07:19

## 2019-01-31 RX ADMIN — KETOROLAC TROMETHAMINE 30 MG: 30 INJECTION, SOLUTION INTRAMUSCULAR; INTRAVENOUS at 07:31

## 2019-01-31 RX ADMIN — CEFAZOLIN SODIUM 3 G: 10 INJECTION, POWDER, FOR SOLUTION INTRAVENOUS at 07:29

## 2019-01-31 RX ADMIN — SUGAMMADEX 450 MG: 100 INJECTION, SOLUTION INTRAVENOUS at 08:16

## 2019-01-31 RX ADMIN — LIDOCAINE HYDROCHLORIDE 50 MG: 10 INJECTION, SOLUTION INFILTRATION; PERINEURAL at 07:22

## 2019-01-31 ASSESSMENT — LIFESTYLE VARIABLES: SMOKING_STATUS: 0

## 2019-01-31 ASSESSMENT — PAIN SCALES - GENERAL
PAINLEVEL_OUTOF10: 0

## 2019-01-31 ASSESSMENT — PAIN - FUNCTIONAL ASSESSMENT: PAIN_FUNCTIONAL_ASSESSMENT: 0-10

## 2019-01-31 ASSESSMENT — ENCOUNTER SYMPTOMS: SHORTNESS OF BREATH: 0

## 2019-02-06 ENCOUNTER — HOSPITAL ENCOUNTER (OUTPATIENT)
Dept: WOUND CARE | Age: 39
Discharge: HOME | End: 2019-02-06
Payer: COMMERCIAL

## 2019-02-06 VITALS
WEIGHT: 315 LBS | TEMPERATURE: 98 F | RESPIRATION RATE: 18 BRPM | BODY MASS INDEX: 38.36 KG/M2 | DIASTOLIC BLOOD PRESSURE: 80 MMHG | HEIGHT: 76 IN | SYSTOLIC BLOOD PRESSURE: 113 MMHG | HEART RATE: 125 BPM

## 2019-02-06 DIAGNOSIS — L97.509 NON-PRESSURE ULCER OF TOE (HCC): ICD-10-CM

## 2019-02-06 DIAGNOSIS — L03.032 CELLULITIS OF LEFT TOE: ICD-10-CM

## 2019-02-06 DIAGNOSIS — E11.621 DIABETIC ULCER OF TOE OF LEFT FOOT ASSOCIATED WITH TYPE 2 DIABETES MELLITUS, WITH FAT LAYER EXPOSED (HCC): ICD-10-CM

## 2019-02-06 DIAGNOSIS — L97.522 DIABETIC ULCER OF TOE OF LEFT FOOT ASSOCIATED WITH TYPE 2 DIABETES MELLITUS, WITH FAT LAYER EXPOSED (HCC): ICD-10-CM

## 2019-02-06 PROCEDURE — 99212 OFFICE O/P EST SF 10 MIN: CPT

## 2019-02-06 PROCEDURE — 99024 POSTOP FOLLOW-UP VISIT: CPT | Performed by: SURGERY

## 2019-02-06 ASSESSMENT — PAIN SCALES - GENERAL: PAINLEVEL_OUTOF10: 0

## 2019-02-11 ENCOUNTER — HOSPITAL ENCOUNTER (OUTPATIENT)
Dept: WOUND CARE | Age: 39
Discharge: HOME | End: 2019-02-11
Payer: COMMERCIAL

## 2019-02-11 VITALS
DIASTOLIC BLOOD PRESSURE: 96 MMHG | RESPIRATION RATE: 18 BRPM | BODY MASS INDEX: 38.36 KG/M2 | SYSTOLIC BLOOD PRESSURE: 155 MMHG | WEIGHT: 315 LBS | TEMPERATURE: 98 F | HEIGHT: 76 IN | HEART RATE: 108 BPM

## 2019-02-11 DIAGNOSIS — L97.522 DIABETIC ULCER OF TOE OF LEFT FOOT ASSOCIATED WITH TYPE 2 DIABETES MELLITUS, WITH FAT LAYER EXPOSED (HCC): ICD-10-CM

## 2019-02-11 DIAGNOSIS — L03.032 CELLULITIS OF LEFT TOE: ICD-10-CM

## 2019-02-11 DIAGNOSIS — E11.621 DIABETIC ULCER OF TOE OF LEFT FOOT ASSOCIATED WITH TYPE 2 DIABETES MELLITUS, WITH FAT LAYER EXPOSED (HCC): ICD-10-CM

## 2019-02-11 DIAGNOSIS — L97.509 NON-PRESSURE ULCER OF TOE (HCC): ICD-10-CM

## 2019-02-11 PROCEDURE — 99212 OFFICE O/P EST SF 10 MIN: CPT

## 2019-02-20 ENCOUNTER — HOSPITAL ENCOUNTER (OUTPATIENT)
Dept: WOUND CARE | Age: 39
Discharge: HOME | End: 2019-02-20
Payer: COMMERCIAL

## 2019-02-20 VITALS
DIASTOLIC BLOOD PRESSURE: 92 MMHG | HEART RATE: 96 BPM | WEIGHT: 315 LBS | HEIGHT: 76 IN | SYSTOLIC BLOOD PRESSURE: 140 MMHG | TEMPERATURE: 98.8 F | BODY MASS INDEX: 38.36 KG/M2 | RESPIRATION RATE: 18 BRPM

## 2019-02-20 DIAGNOSIS — L03.032 CELLULITIS OF LEFT TOE: ICD-10-CM

## 2019-02-20 DIAGNOSIS — L97.509 NON-PRESSURE ULCER OF TOE (HCC): ICD-10-CM

## 2019-02-20 DIAGNOSIS — E11.621 DIABETIC ULCER OF TOE OF LEFT FOOT ASSOCIATED WITH TYPE 2 DIABETES MELLITUS, WITH FAT LAYER EXPOSED (HCC): ICD-10-CM

## 2019-02-20 DIAGNOSIS — L97.522 DIABETIC ULCER OF TOE OF LEFT FOOT ASSOCIATED WITH TYPE 2 DIABETES MELLITUS, WITH FAT LAYER EXPOSED (HCC): ICD-10-CM

## 2019-02-20 PROCEDURE — 99024 POSTOP FOLLOW-UP VISIT: CPT | Performed by: SURGERY

## 2019-02-20 PROCEDURE — 99212 OFFICE O/P EST SF 10 MIN: CPT

## 2019-02-20 ASSESSMENT — PAIN SCALES - GENERAL: PAINLEVEL_OUTOF10: 0

## 2019-02-22 ENCOUNTER — OFFICE VISIT (OUTPATIENT)
Dept: PRIMARY CARE CLINIC | Age: 39
End: 2019-02-22
Payer: COMMERCIAL

## 2019-02-22 VITALS
TEMPERATURE: 97.9 F | HEIGHT: 75 IN | WEIGHT: 315 LBS | HEART RATE: 96 BPM | DIASTOLIC BLOOD PRESSURE: 90 MMHG | OXYGEN SATURATION: 97 % | BODY MASS INDEX: 39.17 KG/M2 | SYSTOLIC BLOOD PRESSURE: 138 MMHG

## 2019-02-22 DIAGNOSIS — Z79.4 TYPE 2 DIABETES MELLITUS WITH FOOT ULCER, WITH LONG-TERM CURRENT USE OF INSULIN (HCC): ICD-10-CM

## 2019-02-22 DIAGNOSIS — L97.509 TYPE 2 DIABETES MELLITUS WITH FOOT ULCER, WITH LONG-TERM CURRENT USE OF INSULIN (HCC): ICD-10-CM

## 2019-02-22 DIAGNOSIS — E11.621 TYPE 2 DIABETES MELLITUS WITH FOOT ULCER, WITH LONG-TERM CURRENT USE OF INSULIN (HCC): ICD-10-CM

## 2019-02-22 DIAGNOSIS — I10 ESSENTIAL HYPERTENSION: Primary | ICD-10-CM

## 2019-02-22 PROCEDURE — 99214 OFFICE O/P EST MOD 30 MIN: CPT | Performed by: NURSE PRACTITIONER

## 2019-02-22 RX ORDER — LISINOPRIL 10 MG/1
10 TABLET ORAL DAILY
Qty: 30 TABLET | Refills: 5 | Status: SHIPPED | OUTPATIENT
Start: 2019-02-22 | End: 2019-05-06 | Stop reason: ALTCHOICE

## 2019-02-22 ASSESSMENT — PATIENT HEALTH QUESTIONNAIRE - PHQ9
SUM OF ALL RESPONSES TO PHQ QUESTIONS 1-9: 2
1. LITTLE INTEREST OR PLEASURE IN DOING THINGS: 1
2. FEELING DOWN, DEPRESSED OR HOPELESS: 1
SUM OF ALL RESPONSES TO PHQ9 QUESTIONS 1 & 2: 2
SUM OF ALL RESPONSES TO PHQ QUESTIONS 1-9: 2

## 2019-02-22 ASSESSMENT — ENCOUNTER SYMPTOMS
ABDOMINAL PAIN: 0
SINUS PAIN: 0
NAUSEA: 0
BACK PAIN: 0
WHEEZING: 0
DIARRHEA: 0
EYE PAIN: 0
VOMITING: 0
COUGH: 0
SHORTNESS OF BREATH: 0
SINUS PRESSURE: 0

## 2019-03-25 ENCOUNTER — OFFICE VISIT (OUTPATIENT)
Dept: PRIMARY CARE CLINIC | Age: 39
End: 2019-03-25
Payer: COMMERCIAL

## 2019-03-25 VITALS
OXYGEN SATURATION: 98 % | HEIGHT: 75 IN | RESPIRATION RATE: 18 BRPM | WEIGHT: 315 LBS | TEMPERATURE: 97.2 F | BODY MASS INDEX: 39.17 KG/M2 | SYSTOLIC BLOOD PRESSURE: 139 MMHG | DIASTOLIC BLOOD PRESSURE: 89 MMHG | HEART RATE: 89 BPM

## 2019-03-25 DIAGNOSIS — L97.509 TYPE 2 DIABETES MELLITUS WITH FOOT ULCER, WITH LONG-TERM CURRENT USE OF INSULIN (HCC): Primary | ICD-10-CM

## 2019-03-25 DIAGNOSIS — E11.621 TYPE 2 DIABETES MELLITUS WITH FOOT ULCER, WITH LONG-TERM CURRENT USE OF INSULIN (HCC): Primary | ICD-10-CM

## 2019-03-25 DIAGNOSIS — Z79.4 TYPE 2 DIABETES MELLITUS WITH FOOT ULCER, WITH LONG-TERM CURRENT USE OF INSULIN (HCC): Primary | ICD-10-CM

## 2019-03-25 LAB — HBA1C MFR BLD: 12.4 %

## 2019-03-25 PROCEDURE — 99213 OFFICE O/P EST LOW 20 MIN: CPT | Performed by: FAMILY MEDICINE

## 2019-03-25 PROCEDURE — 83036 HEMOGLOBIN GLYCOSYLATED A1C: CPT | Performed by: FAMILY MEDICINE

## 2019-03-25 ASSESSMENT — ENCOUNTER SYMPTOMS
COUGH: 0
COLOR CHANGE: 0
SHORTNESS OF BREATH: 0

## 2019-04-02 DIAGNOSIS — E11.42 TYPE 2 DIABETES MELLITUS WITH DIABETIC POLYNEUROPATHY, WITHOUT LONG-TERM CURRENT USE OF INSULIN (HCC): ICD-10-CM

## 2019-04-02 RX ORDER — PREGABALIN 50 MG/1
50 CAPSULE ORAL 2 TIMES DAILY
Qty: 60 CAPSULE | Refills: 3 | Status: SHIPPED | OUTPATIENT
Start: 2019-04-02 | End: 2019-06-19

## 2019-05-06 ENCOUNTER — OFFICE VISIT (OUTPATIENT)
Dept: PRIMARY CARE CLINIC | Age: 39
End: 2019-05-06
Payer: COMMERCIAL

## 2019-05-06 VITALS
SYSTOLIC BLOOD PRESSURE: 140 MMHG | BODY MASS INDEX: 39.17 KG/M2 | WEIGHT: 315 LBS | HEART RATE: 94 BPM | HEIGHT: 75 IN | DIASTOLIC BLOOD PRESSURE: 108 MMHG | OXYGEN SATURATION: 97 % | TEMPERATURE: 96.6 F

## 2019-05-06 DIAGNOSIS — I10 ESSENTIAL HYPERTENSION: ICD-10-CM

## 2019-05-06 DIAGNOSIS — E11.42 TYPE 2 DIABETES MELLITUS WITH DIABETIC POLYNEUROPATHY, WITHOUT LONG-TERM CURRENT USE OF INSULIN (HCC): Primary | ICD-10-CM

## 2019-05-06 PROCEDURE — 99213 OFFICE O/P EST LOW 20 MIN: CPT | Performed by: FAMILY MEDICINE

## 2019-05-06 RX ORDER — LOSARTAN POTASSIUM 50 MG/1
50 TABLET ORAL DAILY
Qty: 30 TABLET | Refills: 5 | Status: SHIPPED | OUTPATIENT
Start: 2019-05-06 | End: 2019-07-22 | Stop reason: SDUPTHER

## 2019-05-06 ASSESSMENT — ENCOUNTER SYMPTOMS
SHORTNESS OF BREATH: 0
COUGH: 0

## 2019-05-06 NOTE — PROGRESS NOTES
Adam Lawrence is a 46 y.o. male    Chief Complaint   Patient presents with    Follow-up     6 weeks    Diabetes       HPI  Adam Lawrence presents to the office for follow up of DM. He is currently on 30 units of basaglar twice daily. He is on novolog 5 units with each meal. He has noticed some improvement of his blood sugars. He states they have been ranging from 175-215 fasting. He also has HTN. He has been taking lisinopril. He complains of dry cough. Review of Systems   Constitutional: Negative for chills and fever. Respiratory: Negative for cough and shortness of breath. Cardiovascular: Negative for chest pain and leg swelling. Skin: Positive for wound. Neurological: Positive for numbness. Prior to Admission medications    Medication Sig Start Date End Date Taking? Authorizing Provider   insulin glargine (BASAGLAR KWIKPEN) 100 UNIT/ML injection pen Inject 35 Units into the skin 2 times daily 5/6/19  Yes Wallace Herrera MD   losartan (COZAAR) 50 MG tablet Take 1 tablet by mouth daily 5/6/19  Yes Wallace Herrera MD   pregabalin (LYRICA) 50 MG capsule Take 1 capsule by mouth 2 times daily for 30 days. 4/2/19 5/6/19 Yes ELENA Meehan   insulin aspart (NOVOLOG FLEXPEN) 100 UNIT/ML injection pen Inject 5 Units into the skin 3 times daily (before meals) May adjust as directed 3/25/19  Yes Wallace Herrera MD   sildenafil (VIAGRA) 100 MG tablet Take 1 tablet by mouth daily as needed for Erectile Dysfunction 12/3/18  Yes Wallace Herrera MD   ONE TOUCH ULTRA TEST strip TEST FASTING DAILY 9/23/18  Yes Wallace Herrera MD   Insulin Pen Needle ([employer] PEN NEEDLES 31G) 31G X 8 MM MISC 1 each by Does not apply route daily 7/17/18  Yes ELENA Meehan   Blood Glucose Monitoring Suppl (ONE TOUCH ULTRA 2) w/Device KIT Test fasting daily. Disp 100 strips and 100 lancets.  11/1/17  Yes Wallace Herrera MD       Past Medical History:   Diagnosis Date    Diabetes mellitus ([facility] [address]     Hyperlipidemia     Sleep apnea     no longer present with weight loss 10-5-17       Past Surgical History:   Procedure Laterality Date    COLONOSCOPY      ENDOSCOPY, COLON, DIAGNOSTIC      [place] EGD TRANSORAL BIOPSY SINGLE/MULTIPLE N/A 10/5/2017    Dr Taylor-w/dilation over wire, 48 [place]-esophageal stricture-Faith (-) chronic inflammation    TOE AMPUTATION Left 1/31/2019    TJR. Left second toe amputation at the metatarsophalangeal.    TONSILLECTOMY AND ADENOIDECTOMY         Social History     Socioeconomic History    Marital status:      Spouse name: None    Number of children: None    Years of education: None    Highest education level: None   Occupational History    None   Social Needs    Financial resource strain: None    Food insecurity:     Worry: None     Inability: None    Transportation needs:     Medical: None     Non-medical: None   Tobacco Use    Smoking status: Never Smoker    Smokeless tobacco: Never Used   Substance and Sexual Activity    Alcohol use: No    Drug use: No    Sexual activity: None   Lifestyle    Physical activity:     Days per week: None     Minutes per session: None    Stress: None   Relationships    Social connections:     Talks on phone: None     Gets together: None     Attends Baptism service: None     Active member of club or organization: None     Attends meetings of clubs or organizations: None     Relationship status: None    Intimate partner violence:     Fear of current or ex partner: None     Emotionally abused: None     Physically abused: None     Forced sexual activity: None   Other Topics Concern    None   Social History Narrative    None       Physical Exam   Constitutional: He is oriented to person, place, and time. He appears well-developed and well-nourished. HENT:   Head: Normocephalic and atraumatic. Eyes: Conjunctivae are normal. No scleral icterus. Neck: Carotid bruit is not present.    Cardiovascular: Normal rate, regular rhythm and normal heart sounds. Exam reveals no friction rub. No murmur heard. Pulmonary/Chest: Effort normal and breath sounds normal. No respiratory distress. He has no wheezes. Abdominal: Soft. He exhibits no distension and no mass. There is no tenderness. There is no rebound and no guarding. Musculoskeletal: Normal range of motion. Lymphadenopathy:        Right cervical: No superficial cervical and no posterior cervical adenopathy present. Left cervical: No superficial cervical and no posterior cervical adenopathy present. Neurological: He is alert and oriented to person, place, and time. No cranial nerve deficit. Skin: Skin is warm. No rash noted. No erythema. Psychiatric: He has a normal mood and affect. Thought content normal.     Monofilament Exam Reveals:  Pulses: normal  Edema:normal  Skin Lesions:normal  Right Foot:    Left Foot:  No sensation at 1-10    No sensation at 1-10       Assessment    ICD-10-CM    1. Type 2 diabetes mellitus with diabetic polyneuropathy, without long-term current use of insulin ([facility]) E11.42 Increase basaglar to 35 units twice daily. Continue novolog 5 units with meals. 2. Essential hypertension I10 Discontinue Lisinopril. Will start Losartan 50 mg po daily. Plan      Orders Placed This Encounter   Medications    insulin glargine (BASAGLAR KWIKPEN) 100 UNIT/ML injection pen     Sig: Inject 35 Units into the skin 2 times daily     Dispense:  5 pen     Refill:  5    losartan (COZAAR) 50 MG tablet     Sig: Take 1 tablet by mouth daily     Dispense:  30 tablet     Refill:  5       No orders of the defined types were placed in this encounter. Return in about 6 weeks (around 6/17/2019) for DM. There are no Patient Instructions on file for this visit.

## 2019-06-19 ENCOUNTER — TELEPHONE (OUTPATIENT)
Dept: PRIMARY CARE CLINIC | Age: 39
End: 2019-06-19

## 2019-06-19 ENCOUNTER — OFFICE VISIT (OUTPATIENT)
Dept: PRIMARY CARE CLINIC | Age: 39
End: 2019-06-19
Payer: COMMERCIAL

## 2019-06-19 ENCOUNTER — HOSPITAL ENCOUNTER (OUTPATIENT)
Dept: GENERAL RADIOLOGY | Age: 39
Discharge: HOME | End: 2019-06-19
Payer: COMMERCIAL

## 2019-06-19 VITALS
WEIGHT: 315 LBS | HEIGHT: 75 IN | HEART RATE: 90 BPM | BODY MASS INDEX: 39.17 KG/M2 | OXYGEN SATURATION: 98 % | TEMPERATURE: 98 F | SYSTOLIC BLOOD PRESSURE: 138 MMHG | DIASTOLIC BLOOD PRESSURE: 88 MMHG

## 2019-06-19 DIAGNOSIS — E11.42 TYPE 2 DIABETES MELLITUS WITH DIABETIC POLYNEUROPATHY, WITHOUT LONG-TERM CURRENT USE OF INSULIN (HCC): Primary | ICD-10-CM

## 2019-06-19 DIAGNOSIS — L08.9 SKIN INFECTION: ICD-10-CM

## 2019-06-19 DIAGNOSIS — M79.672 LEFT FOOT PAIN: ICD-10-CM

## 2019-06-19 PROCEDURE — 73620 X-RAY EXAM OF FOOT: CPT

## 2019-06-19 PROCEDURE — 99213 OFFICE O/P EST LOW 20 MIN: CPT | Performed by: FAMILY MEDICINE

## 2019-06-19 RX ORDER — HYDROCODONE BITARTRATE AND ACETAMINOPHEN 5; 325 MG/1; MG/1
1 TABLET ORAL EVERY 6 HOURS PRN
Qty: 12 TABLET | Refills: 0 | Status: SHIPPED | OUTPATIENT
Start: 2019-06-19 | End: 2019-06-22

## 2019-06-19 RX ORDER — SULFAMETHOXAZOLE AND TRIMETHOPRIM 800; 160 MG/1; MG/1
1 TABLET ORAL 2 TIMES DAILY
Qty: 20 TABLET | Refills: 0 | Status: SHIPPED | OUTPATIENT
Start: 2019-06-19 | End: 2019-06-29

## 2019-06-19 ASSESSMENT — ENCOUNTER SYMPTOMS
SHORTNESS OF BREATH: 0
COUGH: 0

## 2019-06-19 NOTE — TELEPHONE ENCOUNTER
----- Message from Denia Agrawal MD sent at 6/19/2019  1:19 PM CDT -----  Please inform patient of abnormal test results. Complete antibiotics and keep follow up appointment. Impression    1. Degenerative spurring. 2. Second toe amputation noted. 3. No sign of osteomyelitis.

## 2019-06-19 NOTE — PROGRESS NOTES
Afia Aparicio is a 46 y.o. male    Chief Complaint   Patient presents with    Foot Pain     Bottom of foot-small hole,red bruised       Foot Pain    Pain location: left foot. This is a new problem. Episode onset: 2 days ago. History of extremity trauma: unsure. The problem occurs constantly. The problem has been gradually worsening. The quality of the pain is described as aching. Pertinent negatives include no fever, inability to bear weight, joint locking, joint swelling, numbness or stiffness. Exacerbated by: pressure. Patient has noticed some purulent drainage out of his foot. He denies any trauma that he can remember. He does have history of poorly controlled Diabetes Mellitus and history of amputation of left second toe. Review of Systems   Constitutional: Negative for chills and fever. Respiratory: Negative for cough and shortness of breath. Cardiovascular: Negative for chest pain and leg swelling. Musculoskeletal: Negative for stiffness. Skin: Positive for wound. Neurological: Negative for numbness. Prior to Admission medications    Medication Sig Start Date End Date Taking? Authorizing Provider   sulfamethoxazole-trimethoprim (BACTRIM DS;SEPTRA DS) 800-160 MG per tablet Take 1 tablet by mouth 2 times daily for 10 days 6/19/19 6/29/19 Yes Armin White MD   HYDROcodone-acetaminophen (NORCO) 5-325 MG per tablet Take 1 tablet by mouth every 6 hours as needed for Pain for up to 3 days. Intended supply: 3 days.  Take lowest dose possible to manage pain 6/19/19 6/22/19 Yes Armin White MD   insulin glargine [facility]) 100 UNIT/ML injection pen Inject 35 Units into the skin 2 times daily 5/6/19  Yes Armin White MD   losartan (COZAAR) 50 MG tablet Take 1 tablet by mouth daily 5/6/19  Yes Armin White MD   insulin aspart (NOVOLOG FLEXPEN) 100 UNIT/ML injection pen Inject 5 Units into the skin 3 times daily (before meals) May adjust as directed 3/25/19 Yes Haroldo Sheets MD   sildenafil (VIAGRA) 100 MG tablet Take 1 tablet by mouth daily as needed for Erectile Dysfunction 12/3/18  Yes Haroldo Sheets MD   ONE TOUCH ULTRA TEST strip TEST FASTING DAILY 9/23/18  Yes Haroldo Sheets MD   Insulin Pen Needle ([employer] PEN NEEDLES 31G) 31G X 8 MM MISC 1 each by Does not apply route daily 7/17/18  Yes ELENA Aquino   Blood Glucose Monitoring Suppl (ONE TOUCH ULTRA 2) w/Device KIT Test fasting daily. Disp 100 strips and 100 lancets. 11/1/17  Yes Haroldo Sheets MD       Past Medical History:   Diagnosis Date    Diabetes mellitus ([facility] [address]     Hyperlipidemia     Sleep apnea     no longer present with weight loss 10-5-17       Past Surgical History:   Procedure Laterality Date    COLONOSCOPY      ENDOSCOPY, COLON, DIAGNOSTIC      [place] EGD TRANSORAL BIOPSY SINGLE/MULTIPLE N/A 10/5/2017    Dr Taylor-w/dilation over wire, 48 [place]-esophageal stricture-Faith (-) chronic inflammation    TOE AMPUTATION Left 1/31/2019    [facility]. Left second toe amputation at the metatarsophalangeal.    TONSILLECTOMY AND ADENOIDECTOMY         Social History     Socioeconomic History    Marital status:      Spouse name: Not on file    Number of children: Not on file    Years of education: Not on file    Highest education level: Not on file   Occupational History    Not on file   Social Needs    Financial resource strain: Not on file    Food insecurity:     Worry: Not on file     Inability: Not on file    Transportation needs:     Medical: Not on file     Non-medical: Not on file   Tobacco Use    Smoking status: Never Smoker    Smokeless tobacco: Never Used   Substance and Sexual Activity    Alcohol use: No    Drug use: No    Sexual activity: Not on file   Lifestyle    Physical activity:     Days per week: Not on file     Minutes per session: Not on file    Stress: Not on file   Relationships    Social connections:     Talks on phone: Not on file Will start sulfamethoxazole-trimethoprim (BACTRIM DS;SEPTRA DS) 800-160 MG per tablet 1 po bid. Wound culture pending. Plan      Orders Placed This Encounter   Medications    sulfamethoxazole-trimethoprim (BACTRIM DS;SEPTRA DS) 800-160 MG per tablet     Sig: Take 1 tablet by mouth 2 times daily for 10 days     Dispense:  20 tablet     Refill:  0    HYDROcodone-acetaminophen (NORCO) 5-325 MG per tablet     Sig: Take 1 tablet by mouth every 6 hours as needed for Pain for up to 3 days. Intended supply: 3 days. Take lowest dose possible to manage pain     Dispense:  12 tablet     Refill:  0     Reduce doses taken as pain becomes manageable       Orders Placed This Encounter   Procedures    Wound Culture    XR FOOT LEFT (2 VIEWS)        Return if symptoms worsen or fail to improve. There are no Patient Instructions on file for this visit.

## 2019-06-22 LAB
GRAM STAIN RESULT: ABNORMAL
ORGANISM: ABNORMAL
WOUND/ABSCESS: ABNORMAL

## 2019-06-24 ENCOUNTER — HOSPITAL ENCOUNTER (OUTPATIENT)
Dept: WOUND CARE | Age: 39
Discharge: HOME | End: 2019-06-24
Payer: COMMERCIAL

## 2019-06-24 ENCOUNTER — OFFICE VISIT (OUTPATIENT)
Dept: PRIMARY CARE CLINIC | Age: 39
End: 2019-06-24
Payer: COMMERCIAL

## 2019-06-24 VITALS
BODY MASS INDEX: 39.17 KG/M2 | TEMPERATURE: 97.9 F | WEIGHT: 315 LBS | DIASTOLIC BLOOD PRESSURE: 82 MMHG | HEIGHT: 75 IN | RESPIRATION RATE: 20 BRPM | SYSTOLIC BLOOD PRESSURE: 132 MMHG | HEART RATE: 95 BPM | OXYGEN SATURATION: 97 %

## 2019-06-24 VITALS
DIASTOLIC BLOOD PRESSURE: 82 MMHG | HEIGHT: 75 IN | WEIGHT: 315 LBS | RESPIRATION RATE: 16 BRPM | SYSTOLIC BLOOD PRESSURE: 135 MMHG | HEART RATE: 95 BPM | TEMPERATURE: 97.9 F | BODY MASS INDEX: 39.17 KG/M2

## 2019-06-24 DIAGNOSIS — L97.422 MIDFOOT ULCERATION, LEFT, WITH FAT LAYER EXPOSED (HCC): Chronic | ICD-10-CM

## 2019-06-24 DIAGNOSIS — L97.412 ULCER OF RIGHT HEEL, WITH FAT LAYER EXPOSED (HCC): Chronic | ICD-10-CM

## 2019-06-24 DIAGNOSIS — E11.621 DIABETIC ULCER OF LEFT MIDFOOT ASSOCIATED WITH TYPE 2 DIABETES MELLITUS, UNSPECIFIED ULCER STAGE (HCC): ICD-10-CM

## 2019-06-24 DIAGNOSIS — E11.65 UNCONTROLLED TYPE 2 DIABETES MELLITUS WITH HYPERGLYCEMIA (HCC): Primary | Chronic | ICD-10-CM

## 2019-06-24 DIAGNOSIS — E11.65 UNCONTROLLED TYPE 2 DIABETES MELLITUS WITH HYPERGLYCEMIA (HCC): Chronic | ICD-10-CM

## 2019-06-24 DIAGNOSIS — L08.9 SKIN INFECTION: ICD-10-CM

## 2019-06-24 DIAGNOSIS — M79.672 LEFT FOOT PAIN: ICD-10-CM

## 2019-06-24 DIAGNOSIS — L97.429 DIABETIC ULCER OF LEFT MIDFOOT ASSOCIATED WITH TYPE 2 DIABETES MELLITUS, UNSPECIFIED ULCER STAGE (HCC): ICD-10-CM

## 2019-06-24 DIAGNOSIS — E11.42 TYPE 2 DIABETES MELLITUS WITH DIABETIC POLYNEUROPATHY, WITHOUT LONG-TERM CURRENT USE OF INSULIN (HCC): Primary | ICD-10-CM

## 2019-06-24 LAB
ALBUMIN SERPL-MCNC: 3.8 G/DL (ref 3.5–5.2)
ALP BLD-CCNC: 72 U/L (ref 40–130)
ALT SERPL-CCNC: 11 U/L (ref 5–41)
ANION GAP SERPL CALCULATED.3IONS-SCNC: 15 MMOL/L (ref 7–19)
AST SERPL-CCNC: 8 U/L (ref 5–40)
BASOPHILS ABSOLUTE: 0 K/UL (ref 0–0.2)
BASOPHILS RELATIVE PERCENT: 0.2 % (ref 0–1)
BILIRUB SERPL-MCNC: 0.5 MG/DL (ref 0.2–1.2)
BUN BLDV-MCNC: 13 MG/DL (ref 6–20)
C-REACTIVE PROTEIN: 2.5 MG/DL (ref 0–0.5)
CALCIUM SERPL-MCNC: 9 MG/DL (ref 8.6–10)
CHLORIDE BLD-SCNC: 102 MMOL/L (ref 98–111)
CO2: 22 MMOL/L (ref 22–29)
CREAT SERPL-MCNC: 0.9 MG/DL (ref 0.5–1.2)
EOSINOPHILS ABSOLUTE: 0.2 K/UL (ref 0–0.6)
EOSINOPHILS RELATIVE PERCENT: 1.6 % (ref 0–5)
GFR NON-AFRICAN AMERICAN: >60
GLUCOSE BLD-MCNC: 284 MG/DL (ref 74–109)
HBA1C MFR BLD: 11 % (ref 4–6)
HCT VFR BLD CALC: 39.5 % (ref 42–52)
HEMOGLOBIN: 13.6 G/DL (ref 14–18)
LYMPHOCYTES ABSOLUTE: 1.9 K/UL (ref 1.1–4.5)
LYMPHOCYTES RELATIVE PERCENT: 21 % (ref 20–40)
MCH RBC QN AUTO: 32.2 PG (ref 27–31)
MCHC RBC AUTO-ENTMCNC: 34.4 G/DL (ref 33–37)
MCV RBC AUTO: 93.4 FL (ref 80–94)
MONOCYTES ABSOLUTE: 0.6 K/UL (ref 0–0.9)
MONOCYTES RELATIVE PERCENT: 6.3 % (ref 0–10)
NEUTROPHILS ABSOLUTE: 6.5 K/UL (ref 1.5–7.5)
NEUTROPHILS RELATIVE PERCENT: 70.7 % (ref 50–65)
PDW BLD-RTO: 12.3 % (ref 11.5–14.5)
PLATELET # BLD: 191 K/UL (ref 130–400)
PMV BLD AUTO: 10.6 FL (ref 9.4–12.4)
POTASSIUM SERPL-SCNC: 4.3 MMOL/L (ref 3.5–5)
PREALBUMIN: 21 MG/DL (ref 20–40)
RBC # BLD: 4.23 M/UL (ref 4.7–6.1)
SEDIMENTATION RATE, ERYTHROCYTE: 67 MM/HR (ref 0–10)
SODIUM BLD-SCNC: 139 MMOL/L (ref 136–145)
TOTAL PROTEIN: 7.2 G/DL (ref 6.6–8.7)
WBC # BLD: 9.2 K/UL (ref 4.8–10.8)

## 2019-06-24 PROCEDURE — 99213 OFFICE O/P EST LOW 20 MIN: CPT

## 2019-06-24 PROCEDURE — 97597 DBRDMT OPN WND 1ST 20 CM/<: CPT | Performed by: SURGERY

## 2019-06-24 PROCEDURE — 99213 OFFICE O/P EST LOW 20 MIN: CPT | Performed by: SURGERY

## 2019-06-24 PROCEDURE — 99214 OFFICE O/P EST MOD 30 MIN: CPT | Performed by: FAMILY MEDICINE

## 2019-06-24 PROCEDURE — 97597 DBRDMT OPN WND 1ST 20 CM/<: CPT

## 2019-06-24 ASSESSMENT — PAIN DESCRIPTION - FREQUENCY: FREQUENCY: CONTINUOUS

## 2019-06-24 ASSESSMENT — ENCOUNTER SYMPTOMS
COUGH: 0
SHORTNESS OF BREATH: 0

## 2019-06-24 ASSESSMENT — PAIN DESCRIPTION - ORIENTATION: ORIENTATION: LEFT

## 2019-06-24 ASSESSMENT — PAIN SCALES - GENERAL: PAINLEVEL_OUTOF10: 5

## 2019-06-24 ASSESSMENT — PAIN DESCRIPTION - PAIN TYPE: TYPE: ACUTE PAIN

## 2019-06-24 ASSESSMENT — PAIN DESCRIPTION - LOCATION: LOCATION: FOOT

## 2019-06-24 ASSESSMENT — PAIN DESCRIPTION - DESCRIPTORS: DESCRIPTORS: BURNING;SHARP

## 2019-06-24 NOTE — PROGRESS NOTES
daily     Dispense:  5 pen     Refill:  5    insulin aspart (NOVOLOG FLEXPEN) 100 UNIT/ML injection pen     Sig: Inject 10 Units into the skin 3 times daily (before meals) May adjust as directed     Dispense:  5 pen     Refill:  3       Orders Placed This Encounter   Procedures   [place] External Referral To Endocrinology        Return in about 6 months (around 12/24/2019). There are no Patient Instructions on file for this visit.

## 2019-06-24 NOTE — PROGRESS NOTES
of toe of left foot associated with type 2 diabetes mellitus, with fat layer exposed ([facility] [address] 01/22/2019    Non-pressure ulcer of toe ([facility] [address] 01/22/2019    Cellulitis of left toe 01/22/2019    Mixed hyperlipidemia 06/14/2018    Esophageal dysphagia 09/11/2017    Indigestion 09/11/2017    Belching 09/11/2017     Current Outpatient Medications   Medication Sig Dispense Refill    insulin glargine (BASAGLAR KWIKPEN) 100 UNIT/ML injection pen Inject 45 Units into the skin 2 times daily 5 pen 5    insulin aspart (NOVOLOG FLEXPEN) 100 UNIT/ML injection pen Inject 10 Units into the skin 3 times daily (before meals) May adjust as directed 5 pen 3    mupirocin (BACTROBAN) 2 % ointment Apply topically 2 times daily for 20 days 1 Tube 2    sulfamethoxazole-trimethoprim (BACTRIM DS;SEPTRA DS) 800-160 MG per tablet Take 1 tablet by mouth 2 times daily for 10 days 20 tablet 0    losartan (COZAAR) 50 MG tablet Take 1 tablet by mouth daily 30 tablet 5    sildenafil (VIAGRA) 100 MG tablet Take 1 tablet by mouth daily as needed for Erectile Dysfunction 10 tablet 0    ONE TOUCH ULTRA TEST strip TEST FASTING DAILY 100 strip 0    Insulin Pen Needle ([employer] PEN NEEDLES 31G) 31G X 8 MM MISC 1 each by Does not apply route daily 100 each 3    Blood Glucose Monitoring Suppl (ONE TOUCH ULTRA 2) w/Device KIT Test fasting daily. Disp 100 strips and 100 lancets. 1 kit 0     No current facility-administered medications for this encounter.       Allergies: Codeine; Januvia [sitagliptin]; and Niacin and related  Past Medical History:   Diagnosis Date    Diabetes mellitus ([facility] [address]     Hyperlipidemia     Sleep apnea     no longer present with weight loss 10-5-17       Past Surgical History:   Procedure Laterality Date    COLONOSCOPY      ENDOSCOPY, COLON, DIAGNOSTIC      [place] EGD TRANSORAL BIOPSY SINGLE/MULTIPLE N/A 10/5/2017    Dr Taylor-w/dilation over wire, 47 French-esophageal stricture-Faith (-) chronic inflammation    TOE AMPUTATION Left 1/31/2019    TJR. Left second toe amputation at the metatarsophalangeal.    TONSILLECTOMY AND ADENOIDECTOMY       Family History   Problem Relation Age of Onset    Cancer Father         lung, thyroid    High Blood Pressure Father     Colon Cancer Maternal Uncle     Diabetes Mother     Colon Polyps Neg Hx     Liver Cancer Neg Hx     Liver Disease Neg Hx     Esophageal Cancer Neg Hx     Retinal Detachment Neg Hx     Stomach Cancer Neg Hx     Rectal Cancer Neg Hx      Social History     Tobacco Use    Smoking status: Never Smoker    Smokeless tobacco: Never Used   Substance Use Topics    Alcohol use: No         Review of Systems    Constitutional - no significant activity change, appetite change, or unexpected weight change. No fever or chills. No diaphoresis or significant fatigue. HENT - no significant rhinorrhea or epistaxis. No tinnitus or significant hearing loss. Eyes - no sudden vision change or amaurosis. Respiratory - no significant shortness of breath, wheezing, or stridor. No apnea, cough, or chest tightness associated with shortness of breath. Cardiovascular - no chest pain, syncope, or significant dizziness. No palpitations. Patient reports no claudication. Gastrointestinal - no abdominal swelling or pain. No blood in stool. No severe constipation, diarrhea, nausea, or vomiting. Genitourinary - No difficulty urinating, dysuria, frequency, or urgency. No flank pain or hematuria. Musculoskeletal - no back pain, gait disturbance, or myalgia. Skin - no color change, rash, pallor, R&L foot chronic wound. Neurologic - no dizziness, facial asymmetry, or light headedness. No seizures. No speech difficulty or lateralizing weakness. Hematologic - no easy bruising or excessive bleeding. Psychiatric - no severe anxiety or nervousness. No confusion. All other review of systems are negative.     Physical Exam    /82   Pulse 95   Temp 97.9 °F (36.6 °C) (Temporal)   Resp 16   Ht 6' 3\" (1.905 m)   Wt (!) 325 lb (147.4 kg)   BMI 40.62 kg/m²     Constitutional - well developed, well nourished. No diaphoresis or acute distress. HENT - head normocephalic. Right external ear canal appears normal.  Left external ear canal appears normal.  Septum appears midline. Eyes - conjunctiva normal.  EOMS normal.  No exudate. No icterus. Neck- ROM appears normal, no tracheal deviation. Cardiovascular - Regular rate and rhythm. Heart sounds are normal.  No murmur, rub, or gallop. Carotid pulses are 2+ to palpation bilaterally without bruit. Extremities - Radial and brachial pulses are 2+ to palpation bilaterally. Femoral pulses: present 2+bilaterally;Popliteal pulses: present 2+bilaterally Right DP: present 2+; Right PT present 2+; Left DP: present 1+; Left PT: present 1+  No cyanosis, clubbing. no edema. No signs atheroembolic event. Pulmonary - effort appears normal.  No respiratory distress. Lungs - Breath sounds normal. No wheezes or rales. GI - Abdomen - soft, non tender, bowel sounds X 4 quadrants. No guarding or rebound tenderness. No distension or palpable mass. Genitourinary - deferred. Musculoskeletal - ROM appears normal. no edema. Neurologic - alert and oriented X 3. Physiologic. Psychiatric - mood, affect, and behavior appear normal.  Judgment and thought processes appear normal.  Wound - R&L foot wounds    Wound Picture:                    Assessment     R&L foot wounds    1. Uncontrolled type 2 diabetes mellitus with hyperglycemia ([address]    2. Midfoot ulceration, left, with fat layer exposed ([address]    3. Ulcer of right heel, with fat layer exposed ([address]          Procedure Note:    Debridement: After risks benefits and expected outcomes were discussed with the patient an Non-excisional Debridement was performed on 2 and 3 .     Anesthetic: lidocaine gel    Using curette the wound was sharply debrided    down through and including the removal of viable and non-viable epidermis and dermis. Devitalized Tissue Debrided:  fibrin, biofilm, slough, exudate and callusand epidermis and dermis. Percent of Wound Debrided: 100%    Total Surface Area Debrided:  2.5 sq cm   Post Debridement Measurements and Assessment:  Incision 01/31/19 Foot Left (Active)   Number of days: 144     Wound 06/24/19 Foot Left;Plantar #2 left outer plantar diabetic foot wound (Active)   Wound Image   6/24/2019  8:55 AM   Wound Diabetic May 1 6/24/2019  8:55 AM   Dressing Status Clean;Dry 6/24/2019  8:55 AM   Wound Cleansed Rinsed/Irrigated with saline 6/24/2019  8:55 AM   Wound Length (cm) 0.4 cm 6/24/2019  8:55 AM   Wound Width (cm) 0.4 cm 6/24/2019  8:55 AM   Wound Depth (cm) 0.2 cm 6/24/2019  8:55 AM   Wound Surface Area (cm^2) 0.16 cm^2 6/24/2019  8:55 AM   Change in Wound Size % (l*w) 0 6/24/2019  8:55 AM   Wound Volume (cm^3) 0.03 cm^3 6/24/2019  8:55 AM   Wound Healing % 0 6/24/2019  8:55 AM   Post-Procedure Length (cm) 0.4 cm 6/24/2019  9:18 AM   Post-Procedure Width (cm) 0.4 cm 6/24/2019  9:18 AM   Post-Procedure Depth (cm) 0.2 cm 6/24/2019  9:18 AM   Post-Procedure Surface Area (cm^2) 0.16 cm^2 6/24/2019  9:18 AM   Post-Procedure Volume (cm^3) 0.03 cm^3 6/24/2019  9:18 AM   Distance Tunneling (cm) 0 cm 6/24/2019  8:55 AM   Tunneling Position ___ O'Clock 0 6/24/2019  8:55 AM   Undermining Starts ___ O'Clock 0 6/24/2019  8:55 AM   Undermining Ends___ O'Clock 0 6/24/2019  8:55 AM   Undermining Maxium Distance (cm) 0 6/24/2019  8:55 AM   Wound Assessment Clean; White 6/24/2019  8:55 AM   Drainage Amount None 6/24/2019  8:55 AM   Odor None 6/24/2019  8:55 AM   Margins Attached edges 6/24/2019  8:55 AM   [place]%Wound Bed 40 6/24/2019  8:55 AM   Other%Wound Bed 60 6/24/2019  8:55 AM   Number of days: 0       Wound 06/24/19 Heel Right;Plantar #3 right heel diabetic foot wound (Active)   Wound Image   6/24/2019  8:55 AM   Wound Diabetic May 1 6/24/2019  8:55 AM   Dressing Status Clean; Intact 6/24/2019  8:55 AM   Wound Cleansed Rinsed/Irrigated with saline 6/24/2019  8:55 AM   Wound Length (cm) 2.4 cm 6/24/2019  8:55 AM   Wound Width (cm) 1 cm 6/24/2019  8:55 AM   Wound Depth (cm) 0.2 cm 6/24/2019  8:55 AM   Wound Surface Area (cm^2) 2.4 cm^2 6/24/2019  8:55 AM   Wound Volume (cm^3) 0.48 cm^3 6/24/2019  8:55 AM   Post-Procedure Length (cm) 2.4 cm 6/24/2019  9:18 AM   Post-Procedure Width (cm) 1 cm 6/24/2019  9:18 AM   Post-Procedure Depth (cm) 0.2 cm 6/24/2019  9:18 AM   Post-Procedure Surface Area (cm^2) 2.4 cm^2 6/24/2019  9:18 AM   Post-Procedure Volume (cm^3) 0.48 cm^3 6/24/2019  9:18 AM   Distance Tunneling (cm) 0 cm 6/24/2019  8:55 AM   Tunneling Position ___ O'Clock 0 6/24/2019  8:55 AM   Undermining Starts ___ O'Clock 0 6/24/2019  8:55 AM   Undermining Ends___ O'Clock 0 6/24/2019  8:55 AM   Undermining [place] Distance (cm) 0 6/24/2019  8:55 AM   Wound Assessment Clean;Dry; Intact;Granulation tissue 6/24/2019  8:55 AM   Drainage Amount None 6/24/2019  8:55 AM   Odor None 6/24/2019  8:55 AM   Margins Attached edges 6/24/2019  8:55 AM   [place]-wound Assessment Pink 6/24/2019  8:55 AM   [place]%Wound Bed 95 6/24/2019  8:55 AM   Other%Wound Bed 5 6/24/2019  8:55 AM   Number of days: 0      The patients pain isPain Level: 5 Pain Type: Acute pain. Please refer to nursing measurements and assessment regarding wound pre and post debridement. Bleeding: Minimal    Hemostasis:   by pressure and by silver nitrate stick    Response to treatment:  Well tolerated by patient. Plan for wound - Dress per physician order    Treatment:     Compression : No   Offloading : Yes   Dressing : SEE AVS   Additional Therapy : F/F L foot bactroban BID. Pt on bactrim already   Discussed appropriate home care of this wound. Wound redressed. Patient instructions were given. Follow up: 1 week.   Recommend no smoking  Offloading instructions given    Discharge Instructions       Visit Discharge/Physician Orders    Discharge condition: Stable    Discharge to: Home    Left via:Private automobile    Accompanied by: Self    ECF/[facility]: [facility]    Dressing Orders: Right and Left Plantar Foot Ulcers  Soap and water wash  Bactroban Ointment to open areas, Cover with dry gauze, Secure with roll gauze and medipore tape  Change twice daily, Felt and Foam with cutout for wound  High Protein Diet as tolerated   Multi Vitamin daily    [facility] followup visit _____________1 week________________  (Please note your next appointment above and if you are unable to keep, kindly give a 24 hour notice. Thank you.)    If you experience any of the following, please call the [facility] during business hours:    * Increase in Pain  * Temperature over 101  * Increase in drainage from your wound  * Drainage with a foul odor  * Bleeding  * Increase in swelling  * Need for compression bandage changes due to slippage, breakthrough drainage. If you need medical attention outside of the business hours of the [facility] please contact your PCP or go to the nearest emergency room.         Electronically signed by Lubna Servin MD on 6/24/19 at 9:32 AM

## 2019-07-08 ENCOUNTER — HOSPITAL ENCOUNTER (INPATIENT)
Age: 39
LOS: 7 days | Discharge: HOME HEALTH | DRG: 617 | End: 2019-07-15
Attending: INTERNAL MEDICINE | Admitting: HOSPITALIST
Payer: COMMERCIAL

## 2019-07-08 DIAGNOSIS — E11.65 UNCONTROLLED TYPE 2 DIABETES MELLITUS WITH HYPERGLYCEMIA (HCC): Chronic | ICD-10-CM

## 2019-07-08 DIAGNOSIS — L97.412 ULCER OF RIGHT HEEL, WITH FAT LAYER EXPOSED (HCC): Chronic | ICD-10-CM

## 2019-07-08 DIAGNOSIS — L08.9 DIABETIC FOOT INFECTION (HCC): Primary | ICD-10-CM

## 2019-07-08 DIAGNOSIS — L97.422 MIDFOOT ULCERATION, LEFT, WITH FAT LAYER EXPOSED (HCC): Chronic | ICD-10-CM

## 2019-07-08 DIAGNOSIS — E11.628 DIABETIC FOOT INFECTION (HCC): Primary | ICD-10-CM

## 2019-07-08 PROBLEM — L03.311 ABDOMINAL WALL CELLULITIS: Status: ACTIVE | Noted: 2019-07-08

## 2019-07-08 PROBLEM — I10 HTN (HYPERTENSION): Status: ACTIVE | Noted: 2019-07-08

## 2019-07-08 LAB
ALBUMIN SERPL-MCNC: 3.4 G/DL (ref 3.5–5.2)
ALP BLD-CCNC: 71 U/L (ref 40–130)
ALT SERPL-CCNC: 12 U/L (ref 5–41)
ANION GAP SERPL CALCULATED.3IONS-SCNC: 12 MMOL/L (ref 7–19)
AST SERPL-CCNC: 10 U/L (ref 5–40)
BASOPHILS ABSOLUTE: 0 K/UL (ref 0–0.2)
BASOPHILS RELATIVE PERCENT: 0.3 % (ref 0–1)
BILIRUB SERPL-MCNC: 0.5 MG/DL (ref 0.2–1.2)
BUN BLDV-MCNC: 12 MG/DL (ref 6–20)
CALCIUM SERPL-MCNC: 9.3 MG/DL (ref 8.6–10)
CHLORIDE BLD-SCNC: 98 MMOL/L (ref 98–111)
CO2: 26 MMOL/L (ref 22–29)
CREAT SERPL-MCNC: 0.7 MG/DL (ref 0.5–1.2)
EOSINOPHILS ABSOLUTE: 0.3 K/UL (ref 0–0.6)
EOSINOPHILS RELATIVE PERCENT: 2.3 % (ref 0–5)
GFR NON-AFRICAN AMERICAN: >60
GLUCOSE BLD-MCNC: 215 MG/DL (ref 74–109)
GLUCOSE BLD-MCNC: 292 MG/DL (ref 70–99)
HBA1C MFR BLD: 11.5 % (ref 4–6)
HCT VFR BLD CALC: 41.6 % (ref 42–52)
HEMOGLOBIN: 14.4 G/DL (ref 14–18)
INR BLD: 1.04 (ref 0.88–1.18)
LACTIC ACID: 1.7 MMOL/L (ref 0.5–1.9)
LYMPHOCYTES ABSOLUTE: 2.2 K/UL (ref 1.1–4.5)
LYMPHOCYTES RELATIVE PERCENT: 20.2 % (ref 20–40)
MAGNESIUM: 1.6 MG/DL (ref 1.6–2.6)
MCH RBC QN AUTO: 32 PG (ref 27–31)
MCHC RBC AUTO-ENTMCNC: 34.6 G/DL (ref 33–37)
MCV RBC AUTO: 92.4 FL (ref 80–94)
MONOCYTES ABSOLUTE: 0.7 K/UL (ref 0–0.9)
MONOCYTES RELATIVE PERCENT: 6 % (ref 0–10)
NEUTROPHILS ABSOLUTE: 7.7 K/UL (ref 1.5–7.5)
NEUTROPHILS RELATIVE PERCENT: 70.8 % (ref 50–65)
PDW BLD-RTO: 12.1 % (ref 11.5–14.5)
PERFORMED ON: ABNORMAL
PLATELET # BLD: 233 K/UL (ref 130–400)
PMV BLD AUTO: 9.7 FL (ref 9.4–12.4)
POTASSIUM SERPL-SCNC: 4.2 MMOL/L (ref 3.5–5)
PROTHROMBIN TIME: 13 SEC (ref 12–14.6)
RBC # BLD: 4.5 M/UL (ref 4.7–6.1)
SODIUM BLD-SCNC: 136 MMOL/L (ref 136–145)
TOTAL PROTEIN: 7.9 G/DL (ref 6.6–8.7)
WBC # BLD: 10.9 K/UL (ref 4.8–10.8)

## 2019-07-08 PROCEDURE — 1210000000 HC MED SURG R&B

## 2019-07-08 PROCEDURE — 6370000000 HC RX 637 (ALT 250 FOR IP): Performed by: INTERNAL MEDICINE

## 2019-07-08 PROCEDURE — 83036 HEMOGLOBIN GLYCOSYLATED A1C: CPT

## 2019-07-08 PROCEDURE — 99223 1ST HOSP IP/OBS HIGH 75: CPT | Performed by: INTERNAL MEDICINE

## 2019-07-08 PROCEDURE — 87040 BLOOD CULTURE FOR BACTERIA: CPT

## 2019-07-08 PROCEDURE — 83735 ASSAY OF MAGNESIUM: CPT

## 2019-07-08 PROCEDURE — 82948 REAGENT STRIP/BLOOD GLUCOSE: CPT

## 2019-07-08 PROCEDURE — 85610 PROTHROMBIN TIME: CPT

## 2019-07-08 PROCEDURE — 85025 COMPLETE CBC W/AUTO DIFF WBC: CPT

## 2019-07-08 PROCEDURE — 36415 COLL VENOUS BLD VENIPUNCTURE: CPT

## 2019-07-08 PROCEDURE — 80053 COMPREHEN METABOLIC PANEL: CPT

## 2019-07-08 PROCEDURE — 6360000002 HC RX W HCPCS: Performed by: INTERNAL MEDICINE

## 2019-07-08 PROCEDURE — 2580000003 HC RX 258: Performed by: INTERNAL MEDICINE

## 2019-07-08 PROCEDURE — 83605 ASSAY OF LACTIC ACID: CPT

## 2019-07-08 RX ORDER — INSULIN GLARGINE 100 [IU]/ML
40 INJECTION, SOLUTION SUBCUTANEOUS NIGHTLY
Status: DISCONTINUED | OUTPATIENT
Start: 2019-07-08 | End: 2019-07-15 | Stop reason: HOSPADM

## 2019-07-08 RX ORDER — DEXTROSE MONOHYDRATE 25 G/50ML
12.5 INJECTION, SOLUTION INTRAVENOUS PRN
Status: DISCONTINUED | OUTPATIENT
Start: 2019-07-08 | End: 2019-07-15 | Stop reason: HOSPADM

## 2019-07-08 RX ORDER — NICOTINE POLACRILEX 4 MG
15 LOZENGE BUCCAL PRN
Status: DISCONTINUED | OUTPATIENT
Start: 2019-07-08 | End: 2019-07-15 | Stop reason: HOSPADM

## 2019-07-08 RX ORDER — SODIUM CHLORIDE 0.9 % (FLUSH) 0.9 %
10 SYRINGE (ML) INJECTION PRN
Status: DISCONTINUED | OUTPATIENT
Start: 2019-07-08 | End: 2019-07-09

## 2019-07-08 RX ORDER — SODIUM CHLORIDE 9 MG/ML
INJECTION, SOLUTION INTRAVENOUS CONTINUOUS
Status: DISCONTINUED | OUTPATIENT
Start: 2019-07-08 | End: 2019-07-09

## 2019-07-08 RX ORDER — LOSARTAN POTASSIUM 50 MG/1
50 TABLET ORAL DAILY
Status: DISCONTINUED | OUTPATIENT
Start: 2019-07-09 | End: 2019-07-15 | Stop reason: HOSPADM

## 2019-07-08 RX ORDER — ACETAMINOPHEN 325 MG/1
650 TABLET ORAL EVERY 4 HOURS PRN
Status: DISCONTINUED | OUTPATIENT
Start: 2019-07-08 | End: 2019-07-09

## 2019-07-08 RX ORDER — ONDANSETRON 2 MG/ML
4 INJECTION INTRAMUSCULAR; INTRAVENOUS EVERY 6 HOURS PRN
Status: DISCONTINUED | OUTPATIENT
Start: 2019-07-08 | End: 2019-07-09

## 2019-07-08 RX ORDER — SODIUM CHLORIDE 0.9 % (FLUSH) 0.9 %
10 SYRINGE (ML) INJECTION EVERY 12 HOURS SCHEDULED
Status: DISCONTINUED | OUTPATIENT
Start: 2019-07-08 | End: 2019-07-09

## 2019-07-08 RX ORDER — DEXTROSE MONOHYDRATE 50 MG/ML
100 INJECTION, SOLUTION INTRAVENOUS PRN
Status: DISCONTINUED | OUTPATIENT
Start: 2019-07-08 | End: 2019-07-15 | Stop reason: HOSPADM

## 2019-07-08 RX ADMIN — Medication 10 ML: at 21:54

## 2019-07-08 RX ADMIN — ENOXAPARIN SODIUM 40 MG: 40 INJECTION SUBCUTANEOUS at 21:45

## 2019-07-08 RX ADMIN — INSULIN LISPRO 3 UNITS: 100 INJECTION, SOLUTION INTRAVENOUS; SUBCUTANEOUS at 21:48

## 2019-07-08 RX ADMIN — INSULIN GLARGINE 40 UNITS: 100 INJECTION, SOLUTION SUBCUTANEOUS at 21:45

## 2019-07-08 RX ADMIN — VANCOMYCIN HYDROCHLORIDE 2000 MG: 10 INJECTION, POWDER, LYOPHILIZED, FOR SOLUTION INTRAVENOUS at 21:42

## 2019-07-08 RX ADMIN — CEFEPIME 2 G: 2 INJECTION, POWDER, FOR SOLUTION INTRAVENOUS at 21:34

## 2019-07-08 ASSESSMENT — PAIN SCALES - GENERAL: PAINLEVEL_OUTOF10: 0

## 2019-07-08 NOTE — H&P
the skin 3 times daily (before meals) May adjust as directed 6/24/19   Audra Strange MD   mupirocin [facility]) 2 % ointment Apply topically 2 times daily for 20 days 6/24/19 7/14/19  Ryan Rivera MD   losartan (COZAAR) 50 MG tablet Take 1 tablet by mouth daily 5/6/19   Audra Strange MD   ONE TOUCH ULTRA TEST strip TEST FASTING DAILY 9/23/18   Audra Strange MD   Insulin Pen Needle ([employer] PEN NEEDLES 31G) 31G X 8 MM MISC 1 each by Does not apply route daily 7/17/18   ELENA Stewart   Blood Glucose Monitoring Suppl (ONE TOUCH ULTRA 2) w/Device KIT Test fasting daily. Disp 100 strips and 100 lancets. 11/1/17   Audra Strange MD       Allergies:    Codeine; Januvia [sitagliptin]; and Niacin and related    Social History:    The patient currently lives at home  Tobacco:   reports that he has never smoked. He has never used smokeless tobacco.  Alcohol:   reports that he does not drink alcohol. Illicit Drugs: denies    Family History:      Problem Relation Age of Onset    Cancer Father         lung, thyroid    High Blood Pressure Father     Colon Cancer Maternal Uncle     Diabetes Mother     Colon Polyps Neg Hx     Liver Cancer Neg Hx     Liver Disease Neg Hx     Esophageal Cancer Neg Hx     Retinal Detachment Neg Hx     Stomach Cancer Neg Hx     Rectal Cancer Neg Hx        Review of Systems:   As per HPI, rest of 14 point ROS reviewed and negative. Physical Examination:  /84   Pulse 105   Temp 97.6 °F (36.4 °C)   Resp 18   SpO2 96%   General appearance: alert, appears stated age, cooperative and no distress  Head: Normocephalic, without obvious abnormality, atraumatic  Eyes: conjunctivae/corneas clear. PERRL, EOM's intact.    Ears: normal external ears  Neck:supple, symmetrical, trachea midline   Lungs: clear to auscultation bilaterally,no rales or wheezes   Heart: regular rate and rhythm, S1, S2 normal  Abdomen:soft, non-tender, non-distended, +BS, no

## 2019-07-08 NOTE — PROGRESS NOTES
Pharmacy Note  Vancomycin Consult    Juana Akers is a 46 y.o. male started on Vancomycin for diabetic foot infection; consult received from Dr. Alberto Weiner to manage therapy. Also receiving the following antibiotics: Cefepime. Patient Active Problem List   Diagnosis    Esophageal dysphagia    Indigestion    Belching    Mixed hyperlipidemia    Diabetic ulcer of toe of left foot associated with type 2 diabetes mellitus, with fat layer exposed ([address]    Non-pressure ulcer of toe ([address]    Cellulitis of left toe    Uncontrolled type 2 diabetes mellitus with hyperglycemia ([address]    Class 3 severe obesity due to excess calories with serious comorbidity in adult [facility])    Osteomyelitis of toe of left foot (HCC)    Midfoot ulceration, left, with fat layer exposed ([address]    Ulcer of right heel, with fat layer exposed ([address]    Diabetic foot infection ([address]    HTN (hypertension)    Abdominal wall cellulitis       Allergies:  Codeine; Januvia [sitagliptin]; and Niacin and related     Temp max: 97.6    Recent Labs     07/08/19  1706   BUN 12       Recent Labs     07/08/19  1706   CREATININE 0.7       Recent Labs     07/08/19  1706   WBC 10.9*       No intake or output data in the 24 hours ending 07/08/19 1819    Culture Date Source Results   07/08/19 Blood x 2 Collected                 Ht Readings from Last 1 Encounters:   06/24/19 6' 3\" (1.905 m)        Wt Readings from Last 1 Encounters:   06/24/19 (!) 325 lb (147.4 kg)         There is no height or weight on file to calculate BMI. CrCl cannot be calculated (Unknown ideal weight. ). Assessment/Plan:  Will initiate vancomycin 2000 mg IV every 8 hours. Timing of trough level will be determined based on culture results, renal function, and clinical response. Thank you for the consult. Will continue to follow.     Electronically signed by Matt Mcdaniels [facility] on 7/8/2019 at 6:19 PM

## 2019-07-08 NOTE — PROGRESS NOTES
Pharmacy Note  Vancomycin Consult    Gwen Atkinson is a 46 y.o. male started on Vancomycin for diabetic foot infection; consult received from Dr. Sylvia Cunningham to manage therapy. Also receiving the following antibiotics: Cefepime. Patient Active Problem List   Diagnosis    Esophageal dysphagia    Indigestion    Belching    Mixed hyperlipidemia    Diabetic ulcer of toe of left foot associated with type 2 diabetes mellitus, with fat layer exposed ([address]    Non-pressure ulcer of toe ([address]    Cellulitis of left toe    Uncontrolled type 2 diabetes mellitus with hyperglycemia ([address]    Class 3 severe obesity due to excess calories with serious comorbidity in adult [facility])    Osteomyelitis of toe of left foot (HCC)    Midfoot ulceration, left, with fat layer exposed ([address]    Ulcer of right heel, with fat layer exposed ([address]    Diabetic foot infection ([address]    HTN (hypertension)    Abdominal wall cellulitis       Allergies:  Codeine; Januvia [sitagliptin]; and Niacin and related     Temp max: 97.6    Recent Labs     07/08/19  1706   BUN 12       Recent Labs     07/08/19  1706   CREATININE 0.7       Recent Labs     07/08/19  1706   WBC 10.9*       No intake or output data in the 24 hours ending 07/08/19 1819    Culture Date Source Results   07/08/19 Blood x 2 Collected                 Ht Readings from Last 1 Encounters:   06/24/19 6' 3\" (1.905 m)        Wt Readings from Last 1 Encounters:   06/24/19 (!) 325 lb (147.4 kg)         There is no height or weight on file to calculate BMI. CrCl cannot be calculated (Unknown ideal weight. ). Assessment/Plan:  Will initiate vancomycin 2000 mg IV every 8 hours. Timing of trough level will be determined based on culture results, renal function, and clinical response. Thank you for the consult. Will continue to follow.     Electronically signed by Lonnie Fulton [facility] on 7/8/2019 at 6:19 PM

## 2019-07-09 ENCOUNTER — ANESTHESIA (OUTPATIENT)
Dept: OPERATING ROOM | Age: 39
DRG: 617 | End: 2019-07-09
Payer: COMMERCIAL

## 2019-07-09 ENCOUNTER — APPOINTMENT (OUTPATIENT)
Dept: GENERAL RADIOLOGY | Age: 39
DRG: 617 | End: 2019-07-09
Attending: INTERNAL MEDICINE
Payer: COMMERCIAL

## 2019-07-09 ENCOUNTER — ANESTHESIA EVENT (OUTPATIENT)
Dept: OPERATING ROOM | Age: 39
DRG: 617 | End: 2019-07-09
Payer: COMMERCIAL

## 2019-07-09 VITALS
RESPIRATION RATE: 27 BRPM | DIASTOLIC BLOOD PRESSURE: 58 MMHG | SYSTOLIC BLOOD PRESSURE: 95 MMHG | OXYGEN SATURATION: 100 %

## 2019-07-09 PROBLEM — E78.2 MIXED HYPERLIPIDEMIA: Chronic | Status: ACTIVE | Noted: 2018-06-14

## 2019-07-09 PROBLEM — E11.621 TYPE 2 DIABETES MELLITUS WITH FOOT ULCER, WITH LONG-TERM CURRENT USE OF INSULIN (HCC): Chronic | Status: ACTIVE | Noted: 2019-01-29

## 2019-07-09 PROBLEM — L97.509 TYPE 2 DIABETES MELLITUS WITH FOOT ULCER, WITH LONG-TERM CURRENT USE OF INSULIN (HCC): Chronic | Status: ACTIVE | Noted: 2019-01-29

## 2019-07-09 PROBLEM — I10 ESSENTIAL HYPERTENSION: Chronic | Status: ACTIVE | Noted: 2019-07-08

## 2019-07-09 PROBLEM — Z79.4 TYPE 2 DIABETES MELLITUS WITH FOOT ULCER, WITH LONG-TERM CURRENT USE OF INSULIN (HCC): Chronic | Status: ACTIVE | Noted: 2019-01-29

## 2019-07-09 LAB
ANION GAP SERPL CALCULATED.3IONS-SCNC: 10 MMOL/L (ref 7–19)
BASOPHILS ABSOLUTE: 0 K/UL (ref 0–0.2)
BASOPHILS RELATIVE PERCENT: 0.2 % (ref 0–1)
BUN BLDV-MCNC: 10 MG/DL (ref 6–20)
CALCIUM SERPL-MCNC: 9 MG/DL (ref 8.6–10)
CHLORIDE BLD-SCNC: 99 MMOL/L (ref 98–111)
CO2: 28 MMOL/L (ref 22–29)
CREAT SERPL-MCNC: 0.7 MG/DL (ref 0.5–1.2)
EOSINOPHILS ABSOLUTE: 0.2 K/UL (ref 0–0.6)
EOSINOPHILS RELATIVE PERCENT: 2.5 % (ref 0–5)
GFR NON-AFRICAN AMERICAN: >60
GLUCOSE BLD-MCNC: 193 MG/DL (ref 70–99)
GLUCOSE BLD-MCNC: 236 MG/DL (ref 70–99)
GLUCOSE BLD-MCNC: 244 MG/DL (ref 70–99)
GLUCOSE BLD-MCNC: 309 MG/DL (ref 74–109)
GLUCOSE BLD-MCNC: 314 MG/DL (ref 70–99)
HCT VFR BLD CALC: 38.2 % (ref 42–52)
HCT VFR BLD CALC: 38.6 % (ref 42–52)
HEMOGLOBIN: 13 G/DL (ref 14–18)
HEMOGLOBIN: 13 G/DL (ref 14–18)
LYMPHOCYTES ABSOLUTE: 2.2 K/UL (ref 1.1–4.5)
LYMPHOCYTES RELATIVE PERCENT: 26.4 % (ref 20–40)
MCH RBC QN AUTO: 31.7 PG (ref 27–31)
MCH RBC QN AUTO: 32.6 PG (ref 27–31)
MCHC RBC AUTO-ENTMCNC: 33.7 G/DL (ref 33–37)
MCHC RBC AUTO-ENTMCNC: 34 G/DL (ref 33–37)
MCV RBC AUTO: 94.1 FL (ref 80–94)
MCV RBC AUTO: 95.7 FL (ref 80–94)
MONOCYTES ABSOLUTE: 0.5 K/UL (ref 0–0.9)
MONOCYTES RELATIVE PERCENT: 6.1 % (ref 0–10)
NEUTROPHILS ABSOLUTE: 5.3 K/UL (ref 1.5–7.5)
NEUTROPHILS RELATIVE PERCENT: 64.6 % (ref 50–65)
PDW BLD-RTO: 12.2 % (ref 11.5–14.5)
PDW BLD-RTO: 12.2 % (ref 11.5–14.5)
PERFORMED ON: ABNORMAL
PLATELET # BLD: 214 K/UL (ref 130–400)
PLATELET # BLD: 244 K/UL (ref 130–400)
PMV BLD AUTO: 9.6 FL (ref 9.4–12.4)
PMV BLD AUTO: 9.9 FL (ref 9.4–12.4)
POTASSIUM REFLEX MAGNESIUM: 4.3 MMOL/L (ref 3.5–5)
RBC # BLD: 3.99 M/UL (ref 4.7–6.1)
RBC # BLD: 4.1 M/UL (ref 4.7–6.1)
SODIUM BLD-SCNC: 137 MMOL/L (ref 136–145)
WBC # BLD: 8.1 K/UL (ref 4.8–10.8)
WBC # BLD: 9.3 K/UL (ref 4.8–10.8)

## 2019-07-09 PROCEDURE — 87205 SMEAR GRAM STAIN: CPT

## 2019-07-09 PROCEDURE — 6360000002 HC RX W HCPCS: Performed by: INTERNAL MEDICINE

## 2019-07-09 PROCEDURE — 6370000000 HC RX 637 (ALT 250 FOR IP): Performed by: SURGERY

## 2019-07-09 PROCEDURE — 7100000000 HC PACU RECOVERY - FIRST 15 MIN: Performed by: SURGERY

## 2019-07-09 PROCEDURE — 0JBR0ZZ EXCISION OF LEFT FOOT SUBCUTANEOUS TISSUE AND FASCIA, OPEN APPROACH: ICD-10-PCS | Performed by: SURGERY

## 2019-07-09 PROCEDURE — 80048 BASIC METABOLIC PNL TOTAL CA: CPT

## 2019-07-09 PROCEDURE — 97597 DBRDMT OPN WND 1ST 20 CM/<: CPT | Performed by: SURGERY

## 2019-07-09 PROCEDURE — 0JDQ3ZZ EXTRACTION OF RIGHT FOOT SUBCUTANEOUS TISSUE AND FASCIA, PERCUTANEOUS APPROACH: ICD-10-PCS | Performed by: SURGERY

## 2019-07-09 PROCEDURE — 87075 CULTR BACTERIA EXCEPT BLOOD: CPT

## 2019-07-09 PROCEDURE — 3700000000 HC ANESTHESIA ATTENDED CARE: Performed by: SURGERY

## 2019-07-09 PROCEDURE — 87186 SC STD MICRODIL/AGAR DIL: CPT

## 2019-07-09 PROCEDURE — 6370000000 HC RX 637 (ALT 250 FOR IP): Performed by: INTERNAL MEDICINE

## 2019-07-09 PROCEDURE — 11042 DBRDMT SUBQ TIS 1ST 20SQCM/<: CPT | Performed by: SURGERY

## 2019-07-09 PROCEDURE — 2580000003 HC RX 258: Performed by: SURGERY

## 2019-07-09 PROCEDURE — 85027 COMPLETE CBC AUTOMATED: CPT

## 2019-07-09 PROCEDURE — 99232 SBSQ HOSP IP/OBS MODERATE 35: CPT | Performed by: HOSPITALIST

## 2019-07-09 PROCEDURE — 87176 TISSUE HOMOGENIZATION CULTR: CPT

## 2019-07-09 PROCEDURE — 99253 IP/OBS CNSLTJ NEW/EST LOW 45: CPT | Performed by: SURGERY

## 2019-07-09 PROCEDURE — 87077 CULTURE AEROBIC IDENTIFY: CPT

## 2019-07-09 PROCEDURE — 1210000000 HC MED SURG R&B

## 2019-07-09 PROCEDURE — 2580000003 HC RX 258: Performed by: NURSE ANESTHETIST, CERTIFIED REGISTERED

## 2019-07-09 PROCEDURE — 6360000002 HC RX W HCPCS: Performed by: SURGERY

## 2019-07-09 PROCEDURE — 36415 COLL VENOUS BLD VENIPUNCTURE: CPT

## 2019-07-09 PROCEDURE — 86403 PARTICLE AGGLUT ANTBDY SCRN: CPT

## 2019-07-09 PROCEDURE — 10061 I&D ABSCESS COMP/MULTIPLE: CPT | Performed by: SURGERY

## 2019-07-09 PROCEDURE — 2580000003 HC RX 258: Performed by: INTERNAL MEDICINE

## 2019-07-09 PROCEDURE — 85025 COMPLETE CBC W/AUTO DIFF WBC: CPT

## 2019-07-09 PROCEDURE — 73630 X-RAY EXAM OF FOOT: CPT

## 2019-07-09 PROCEDURE — 93923 UPR/LXTR ART STDY 3+ LVLS: CPT

## 2019-07-09 PROCEDURE — 82948 REAGENT STRIP/BLOOD GLUCOSE: CPT

## 2019-07-09 PROCEDURE — 0JDR3ZZ EXTRACTION OF LEFT FOOT SUBCUTANEOUS TISSUE AND FASCIA, PERCUTANEOUS APPROACH: ICD-10-PCS | Performed by: SURGERY

## 2019-07-09 PROCEDURE — 6360000002 HC RX W HCPCS: Performed by: NURSE ANESTHETIST, CERTIFIED REGISTERED

## 2019-07-09 PROCEDURE — 6370000000 HC RX 637 (ALT 250 FOR IP): Performed by: NURSE PRACTITIONER

## 2019-07-09 PROCEDURE — 3600000014 HC SURGERY LEVEL 4 ADDTL 15MIN: Performed by: SURGERY

## 2019-07-09 PROCEDURE — 2709999900 HC NON-CHARGEABLE SUPPLY: Performed by: SURGERY

## 2019-07-09 PROCEDURE — 3700000001 HC ADD 15 MINUTES (ANESTHESIA): Performed by: SURGERY

## 2019-07-09 PROCEDURE — 7100000001 HC PACU RECOVERY - ADDTL 15 MIN: Performed by: SURGERY

## 2019-07-09 PROCEDURE — 87070 CULTURE OTHR SPECIMN AEROBIC: CPT

## 2019-07-09 PROCEDURE — 2500000003 HC RX 250 WO HCPCS: Performed by: NURSE ANESTHETIST, CERTIFIED REGISTERED

## 2019-07-09 PROCEDURE — 3600000004 HC SURGERY LEVEL 4 BASE: Performed by: SURGERY

## 2019-07-09 PROCEDURE — 0J9R0ZX DRAINAGE OF LEFT FOOT SUBCUTANEOUS TISSUE AND FASCIA, OPEN APPROACH, DIAGNOSTIC: ICD-10-PCS | Performed by: SURGERY

## 2019-07-09 RX ORDER — LABETALOL 20 MG/4 ML (5 MG/ML) INTRAVENOUS SYRINGE
5 EVERY 10 MIN PRN
Status: DISCONTINUED | OUTPATIENT
Start: 2019-07-09 | End: 2019-07-09 | Stop reason: HOSPADM

## 2019-07-09 RX ORDER — DIPHENHYDRAMINE HYDROCHLORIDE 50 MG/ML
12.5 INJECTION INTRAMUSCULAR; INTRAVENOUS
Status: DISCONTINUED | OUTPATIENT
Start: 2019-07-09 | End: 2019-07-09 | Stop reason: HOSPADM

## 2019-07-09 RX ORDER — METHYLENE BLUE 10 MG/ML
INJECTION INTRAVENOUS PRN
Status: DISCONTINUED | OUTPATIENT
Start: 2019-07-09 | End: 2019-07-09 | Stop reason: HOSPADM

## 2019-07-09 RX ORDER — MORPHINE SULFATE 2 MG/ML
4 INJECTION, SOLUTION INTRAMUSCULAR; INTRAVENOUS EVERY 5 MIN PRN
Status: DISCONTINUED | OUTPATIENT
Start: 2019-07-09 | End: 2019-07-09 | Stop reason: HOSPADM

## 2019-07-09 RX ORDER — SODIUM CHLORIDE, SODIUM LACTATE, POTASSIUM CHLORIDE, CALCIUM CHLORIDE 600; 310; 30; 20 MG/100ML; MG/100ML; MG/100ML; MG/100ML
INJECTION, SOLUTION INTRAVENOUS CONTINUOUS PRN
Status: DISCONTINUED | OUTPATIENT
Start: 2019-07-09 | End: 2019-07-09 | Stop reason: SDUPTHER

## 2019-07-09 RX ORDER — SODIUM HYPOCHLORITE 1.25 MG/ML
SOLUTION TOPICAL 2 TIMES DAILY
Status: DISCONTINUED | OUTPATIENT
Start: 2019-07-09 | End: 2019-07-11

## 2019-07-09 RX ORDER — LIDOCAINE HYDROCHLORIDE 10 MG/ML
INJECTION, SOLUTION EPIDURAL; INFILTRATION; INTRACAUDAL; PERINEURAL PRN
Status: DISCONTINUED | OUTPATIENT
Start: 2019-07-09 | End: 2019-07-09 | Stop reason: SDUPTHER

## 2019-07-09 RX ORDER — PROPOFOL 10 MG/ML
INJECTION, EMULSION INTRAVENOUS PRN
Status: DISCONTINUED | OUTPATIENT
Start: 2019-07-09 | End: 2019-07-09 | Stop reason: SDUPTHER

## 2019-07-09 RX ORDER — CLONIDINE HYDROCHLORIDE 0.1 MG/1
0.1 TABLET ORAL
Status: DISCONTINUED | OUTPATIENT
Start: 2019-07-09 | End: 2019-07-15 | Stop reason: HOSPADM

## 2019-07-09 RX ORDER — MORPHINE SULFATE 2 MG/ML
2 INJECTION, SOLUTION INTRAMUSCULAR; INTRAVENOUS EVERY 5 MIN PRN
Status: DISCONTINUED | OUTPATIENT
Start: 2019-07-09 | End: 2019-07-09 | Stop reason: HOSPADM

## 2019-07-09 RX ORDER — SODIUM CHLORIDE 9 MG/ML
INJECTION, SOLUTION INTRAVENOUS CONTINUOUS
Status: DISCONTINUED | OUTPATIENT
Start: 2019-07-09 | End: 2019-07-12

## 2019-07-09 RX ORDER — METOCLOPRAMIDE HYDROCHLORIDE 5 MG/ML
10 INJECTION INTRAMUSCULAR; INTRAVENOUS
Status: DISCONTINUED | OUTPATIENT
Start: 2019-07-09 | End: 2019-07-09 | Stop reason: HOSPADM

## 2019-07-09 RX ORDER — PROMETHAZINE HYDROCHLORIDE 25 MG/ML
6.25 INJECTION, SOLUTION INTRAMUSCULAR; INTRAVENOUS
Status: DISCONTINUED | OUTPATIENT
Start: 2019-07-09 | End: 2019-07-09 | Stop reason: HOSPADM

## 2019-07-09 RX ORDER — ONDANSETRON 2 MG/ML
4 INJECTION INTRAMUSCULAR; INTRAVENOUS EVERY 6 HOURS PRN
Status: DISCONTINUED | OUTPATIENT
Start: 2019-07-09 | End: 2019-07-15 | Stop reason: HOSPADM

## 2019-07-09 RX ORDER — ASPIRIN 81 MG/1
81 TABLET ORAL DAILY
Status: DISCONTINUED | OUTPATIENT
Start: 2019-07-09 | End: 2019-07-15 | Stop reason: HOSPADM

## 2019-07-09 RX ORDER — CHLORHEXIDINE GLUCONATE 4 G/100ML
SOLUTION TOPICAL ONCE
Status: DISCONTINUED | OUTPATIENT
Start: 2019-07-09 | End: 2019-07-09

## 2019-07-09 RX ORDER — HYDROCODONE BITARTRATE AND ACETAMINOPHEN 5; 325 MG/1; MG/1
2 TABLET ORAL EVERY 4 HOURS PRN
Status: DISCONTINUED | OUTPATIENT
Start: 2019-07-09 | End: 2019-07-15 | Stop reason: HOSPADM

## 2019-07-09 RX ORDER — SODIUM CHLORIDE 0.9 % (FLUSH) 0.9 %
10 SYRINGE (ML) INJECTION EVERY 12 HOURS SCHEDULED
Status: DISCONTINUED | OUTPATIENT
Start: 2019-07-09 | End: 2019-07-15 | Stop reason: HOSPADM

## 2019-07-09 RX ORDER — HYDRALAZINE HYDROCHLORIDE 20 MG/ML
5 INJECTION INTRAMUSCULAR; INTRAVENOUS EVERY 10 MIN PRN
Status: DISCONTINUED | OUTPATIENT
Start: 2019-07-09 | End: 2019-07-09 | Stop reason: HOSPADM

## 2019-07-09 RX ORDER — FENTANYL CITRATE 50 UG/ML
INJECTION, SOLUTION INTRAMUSCULAR; INTRAVENOUS PRN
Status: DISCONTINUED | OUTPATIENT
Start: 2019-07-09 | End: 2019-07-09 | Stop reason: SDUPTHER

## 2019-07-09 RX ORDER — MORPHINE SULFATE 2 MG/ML
4 INJECTION, SOLUTION INTRAMUSCULAR; INTRAVENOUS
Status: DISCONTINUED | OUTPATIENT
Start: 2019-07-09 | End: 2019-07-15 | Stop reason: HOSPADM

## 2019-07-09 RX ORDER — ACETAMINOPHEN 325 MG/1
650 TABLET ORAL EVERY 4 HOURS PRN
Status: DISCONTINUED | OUTPATIENT
Start: 2019-07-09 | End: 2019-07-15 | Stop reason: HOSPADM

## 2019-07-09 RX ORDER — HYDROCODONE BITARTRATE AND ACETAMINOPHEN 5; 325 MG/1; MG/1
1 TABLET ORAL EVERY 4 HOURS PRN
Status: DISCONTINUED | OUTPATIENT
Start: 2019-07-09 | End: 2019-07-15 | Stop reason: HOSPADM

## 2019-07-09 RX ORDER — ONDANSETRON 2 MG/ML
INJECTION INTRAMUSCULAR; INTRAVENOUS PRN
Status: DISCONTINUED | OUTPATIENT
Start: 2019-07-09 | End: 2019-07-09 | Stop reason: SDUPTHER

## 2019-07-09 RX ORDER — SODIUM HYPOCHLORITE 1.25 MG/ML
SOLUTION TOPICAL PRN
Status: DISCONTINUED | OUTPATIENT
Start: 2019-07-09 | End: 2019-07-09 | Stop reason: HOSPADM

## 2019-07-09 RX ORDER — ENALAPRILAT 2.5 MG/2ML
1.25 INJECTION INTRAVENOUS
Status: DISCONTINUED | OUTPATIENT
Start: 2019-07-09 | End: 2019-07-09 | Stop reason: HOSPADM

## 2019-07-09 RX ORDER — MORPHINE SULFATE 2 MG/ML
2 INJECTION, SOLUTION INTRAMUSCULAR; INTRAVENOUS
Status: DISCONTINUED | OUTPATIENT
Start: 2019-07-09 | End: 2019-07-15 | Stop reason: HOSPADM

## 2019-07-09 RX ORDER — MEPERIDINE HYDROCHLORIDE 50 MG/ML
12.5 INJECTION INTRAMUSCULAR; INTRAVENOUS; SUBCUTANEOUS EVERY 5 MIN PRN
Status: DISCONTINUED | OUTPATIENT
Start: 2019-07-09 | End: 2019-07-09 | Stop reason: HOSPADM

## 2019-07-09 RX ORDER — SODIUM HYPOCHLORITE 1.25 MG/ML
SOLUTION TOPICAL DAILY
Status: DISCONTINUED | OUTPATIENT
Start: 2019-07-09 | End: 2019-07-09

## 2019-07-09 RX ORDER — SODIUM CHLORIDE, SODIUM LACTATE, POTASSIUM CHLORIDE, CALCIUM CHLORIDE 600; 310; 30; 20 MG/100ML; MG/100ML; MG/100ML; MG/100ML
INJECTION, SOLUTION INTRAVENOUS CONTINUOUS
Status: DISCONTINUED | OUTPATIENT
Start: 2019-07-09 | End: 2019-07-09

## 2019-07-09 RX ORDER — SODIUM CHLORIDE 0.9 % (FLUSH) 0.9 %
10 SYRINGE (ML) INJECTION PRN
Status: DISCONTINUED | OUTPATIENT
Start: 2019-07-09 | End: 2019-07-15 | Stop reason: HOSPADM

## 2019-07-09 RX ADMIN — INSULIN LISPRO 10 UNITS: 100 INJECTION, SOLUTION INTRAVENOUS; SUBCUTANEOUS at 17:53

## 2019-07-09 RX ADMIN — SODIUM CHLORIDE: 9 INJECTION, SOLUTION INTRAVENOUS at 23:33

## 2019-07-09 RX ADMIN — FENTANYL CITRATE 50 MCG: 50 INJECTION INTRAMUSCULAR; INTRAVENOUS at 13:09

## 2019-07-09 RX ADMIN — INSULIN LISPRO 2 UNITS: 100 INJECTION, SOLUTION INTRAVENOUS; SUBCUTANEOUS at 20:22

## 2019-07-09 RX ADMIN — ONDANSETRON 4 MG: 2 INJECTION INTRAMUSCULAR; INTRAVENOUS at 16:34

## 2019-07-09 RX ADMIN — VANCOMYCIN HYDROCHLORIDE 2000 MG: 10 INJECTION, POWDER, LYOPHILIZED, FOR SOLUTION INTRAVENOUS at 04:49

## 2019-07-09 RX ADMIN — DAKIN'S SOLUTION 0.125% (QUARTER STRENGTH) 473 ML: 0.12 SOLUTION at 20:22

## 2019-07-09 RX ADMIN — LIDOCAINE HYDROCHLORIDE 5 ML: 10 INJECTION, SOLUTION EPIDURAL; INFILTRATION; INTRACAUDAL; PERINEURAL at 13:09

## 2019-07-09 RX ADMIN — INSULIN LISPRO 4 UNITS: 100 INJECTION, SOLUTION INTRAVENOUS; SUBCUTANEOUS at 17:48

## 2019-07-09 RX ADMIN — ASPIRIN 81 MG: 81 TABLET, COATED ORAL at 17:48

## 2019-07-09 RX ADMIN — INSULIN LISPRO 8 UNITS: 100 INJECTION, SOLUTION INTRAVENOUS; SUBCUTANEOUS at 08:27

## 2019-07-09 RX ADMIN — SODIUM CHLORIDE, SODIUM LACTATE, POTASSIUM CHLORIDE, AND CALCIUM CHLORIDE: 600; 310; 30; 20 INJECTION, SOLUTION INTRAVENOUS at 13:00

## 2019-07-09 RX ADMIN — PROPOFOL 200 MG: 10 INJECTION, EMULSION INTRAVENOUS at 13:09

## 2019-07-09 RX ADMIN — SODIUM CHLORIDE, SODIUM LACTATE, POTASSIUM CHLORIDE, AND CALCIUM CHLORIDE: 600; 310; 30; 20 INJECTION, SOLUTION INTRAVENOUS at 12:29

## 2019-07-09 RX ADMIN — VANCOMYCIN HYDROCHLORIDE 2000 MG: 10 INJECTION, POWDER, LYOPHILIZED, FOR SOLUTION INTRAVENOUS at 20:15

## 2019-07-09 RX ADMIN — ONDANSETRON HYDROCHLORIDE 4 MG: 2 INJECTION, SOLUTION INTRAMUSCULAR; INTRAVENOUS at 13:09

## 2019-07-09 RX ADMIN — SODIUM CHLORIDE: 9 INJECTION, SOLUTION INTRAVENOUS at 16:34

## 2019-07-09 RX ADMIN — ONDANSETRON 4 MG: 2 INJECTION INTRAMUSCULAR; INTRAVENOUS at 23:23

## 2019-07-09 RX ADMIN — Medication 10 ML: at 20:22

## 2019-07-09 RX ADMIN — VANCOMYCIN HYDROCHLORIDE 2000 MG: 10 INJECTION, POWDER, LYOPHILIZED, FOR SOLUTION INTRAVENOUS at 12:29

## 2019-07-09 RX ADMIN — LOSARTAN POTASSIUM 50 MG: 50 TABLET ORAL at 08:24

## 2019-07-09 RX ADMIN — CEFEPIME 2 G: 2 INJECTION, POWDER, FOR SOLUTION INTRAVENOUS at 17:48

## 2019-07-09 RX ADMIN — ACETAMINOPHEN 650 MG: 325 TABLET ORAL at 23:22

## 2019-07-09 RX ADMIN — ONDANSETRON 4 MG: 2 INJECTION INTRAMUSCULAR; INTRAVENOUS at 15:39

## 2019-07-09 RX ADMIN — INSULIN LISPRO 10 UNITS: 100 INJECTION, SOLUTION INTRAVENOUS; SUBCUTANEOUS at 08:29

## 2019-07-09 RX ADMIN — CEFEPIME 2 G: 2 INJECTION, POWDER, FOR SOLUTION INTRAVENOUS at 06:23

## 2019-07-09 RX ADMIN — SODIUM CHLORIDE: 9 INJECTION, SOLUTION INTRAVENOUS at 00:34

## 2019-07-09 RX ADMIN — CHLORHEXIDINE GLUCONATE: 213 SOLUTION TOPICAL at 09:00

## 2019-07-09 RX ADMIN — INSULIN GLARGINE 40 UNITS: 100 INJECTION, SOLUTION SUBCUTANEOUS at 20:23

## 2019-07-09 ASSESSMENT — PAIN SCALES - GENERAL
PAINLEVEL_OUTOF10: 1
PAINLEVEL_OUTOF10: 0
PAINLEVEL_OUTOF10: 2

## 2019-07-09 ASSESSMENT — LIFESTYLE VARIABLES: SMOKING_STATUS: 0

## 2019-07-09 ASSESSMENT — PAIN DESCRIPTION - DESCRIPTORS: DESCRIPTORS: THROBBING

## 2019-07-09 ASSESSMENT — PAIN DESCRIPTION - LOCATION: LOCATION: FOOT

## 2019-07-09 ASSESSMENT — PAIN DESCRIPTION - FREQUENCY: FREQUENCY: INTERMITTENT

## 2019-07-09 ASSESSMENT — PAIN DESCRIPTION - PROGRESSION: CLINICAL_PROGRESSION: NOT CHANGED

## 2019-07-09 ASSESSMENT — PAIN DESCRIPTION - ONSET: ONSET: ON-GOING

## 2019-07-09 ASSESSMENT — ENCOUNTER SYMPTOMS: SHORTNESS OF BREATH: 0

## 2019-07-09 ASSESSMENT — PAIN DESCRIPTION - ORIENTATION: ORIENTATION: LEFT

## 2019-07-09 ASSESSMENT — PAIN DESCRIPTION - PAIN TYPE: TYPE: ACUTE PAIN

## 2019-07-09 ASSESSMENT — PAIN - FUNCTIONAL ASSESSMENT: PAIN_FUNCTIONAL_ASSESSMENT: ACTIVITIES ARE NOT PREVENTED

## 2019-07-09 NOTE — PROGRESS NOTES
Wound Care  Referral re: B/L foot wounds, RLQ abd wound. Vascular has been consulted to see patient. Patient  Is now in surgery for wound debridement. Noted patient wt 374 and 6'3\", recommend bariatric bed with foam.  Will plan to follow up with patient at a later time.

## 2019-07-09 NOTE — ANESTHESIA PRE PROCEDURE
Hold] sodium chloride flush 0.9 % injection 10 mL  10 mL Intravenous PRN Akron Couch, DO        [MAR Hold] ondansetron [facility]) injection 4 mg  4 mg Intravenous Q6H PRN Mario Alberto Fabiendejan Franks, DO        [MAR Hold] enoxaparin (LOVENOX) injection 40 mg  40 mg Subcutaneous Daily Mario Alberto Fabiendejan Franks, DO   40 mg at 07/08/19 2145    [MAR Hold] acetaminophen (TYLENOL) tablet 650 mg  650 mg Oral Q4H PRN Mario Alberto Franks, DO        [MAR Hold] ceFEPIme (MAXIPIME) 2 g in sterile water 20 mL IV syringe  2 g Intravenous Q12H MarioA lberto Franks, DO   2 g at 07/09/19 0623    [MAR Hold] glucose (GLUTOSE) 40 % oral gel 15 g  15 g Oral PRN Mario Alberto Franks, DO        [facility] Hold] dextrose 50 % IV solution  12.5 g Intravenous PRN Mario Alberto Franks, DO        [MAR Hold] glucagon (rDNA) injection 1 mg  1 mg Intramuscular PRN Mario Alberto Franks, DO        [facility] Hold] dextrose 5 % solution  100 mL/hr Intravenous PRN Mario Alberto Franks, DO        [facility] Hold] insulin lispro (HUMALOG) injection vial 0-12 Units  0-12 Units Subcutaneous TID  Mario Alberto Conndejan Frnaks, DO   8 Units at 07/09/19 0827    [MAR Hold] insulin lispro (HUMALOG) injection vial 0-6 Units  0-6 Units Subcutaneous Nightly Gudelia Couch, DO   3 Units at 07/08/19 2148    [MAR Hold] insulin glargine (LANTUS) injection vial 40 Units  40 Units Subcutaneous Nightly Akron Couch, DO   40 Units at 07/08/19 2145    [MAR Hold] insulin lispro (HUMALOG) injection vial 10 Units  10 Units Subcutaneous TID  Mario Alberto Chavarria Tiny, DO   10 Units at 07/09/19 0829    [MAR Hold] vancomycin (VANCOCIN) 2,000 mg in sodium chloride 0.9 % 500 mL IVPB  2,000 mg Intravenous Q8H Mario Alberto Franks DO   Stopped at 07/09/19 0742    [MAR Hold] vancomycin (VANCOCIN) intermittent dosing (placeholder)   Other RX Placeholder Mario Alberto Franks DO        [facility] Hold] 0.9 % sodium chloride infusion   Intravenous Continuous Gudelia Charles  mL/hr at 07/09/19 0034         Allergies:     Allergies   Allergen Reactions   

## 2019-07-10 LAB
ANION GAP SERPL CALCULATED.3IONS-SCNC: 12 MMOL/L (ref 7–19)
BUN BLDV-MCNC: 9 MG/DL (ref 6–20)
CALCIUM SERPL-MCNC: 8.4 MG/DL (ref 8.6–10)
CHLORIDE BLD-SCNC: 100 MMOL/L (ref 98–111)
CO2: 28 MMOL/L (ref 22–29)
CREAT SERPL-MCNC: 0.9 MG/DL (ref 0.5–1.2)
GFR NON-AFRICAN AMERICAN: >60
GLUCOSE BLD-MCNC: 180 MG/DL (ref 70–99)
GLUCOSE BLD-MCNC: 218 MG/DL (ref 70–99)
GLUCOSE BLD-MCNC: 243 MG/DL (ref 70–99)
GLUCOSE BLD-MCNC: 252 MG/DL (ref 70–99)
GLUCOSE BLD-MCNC: 265 MG/DL (ref 74–109)
HCT VFR BLD CALC: 35.3 % (ref 42–52)
HEMOGLOBIN: 11.8 G/DL (ref 14–18)
MCH RBC QN AUTO: 31.5 PG (ref 27–31)
MCHC RBC AUTO-ENTMCNC: 33.4 G/DL (ref 33–37)
MCV RBC AUTO: 94.1 FL (ref 80–94)
PDW BLD-RTO: 12.2 % (ref 11.5–14.5)
PERFORMED ON: ABNORMAL
PLATELET # BLD: 199 K/UL (ref 130–400)
PMV BLD AUTO: 9.7 FL (ref 9.4–12.4)
POTASSIUM SERPL-SCNC: 4.2 MMOL/L (ref 3.5–5)
RBC # BLD: 3.75 M/UL (ref 4.7–6.1)
SODIUM BLD-SCNC: 140 MMOL/L (ref 136–145)
VANCOMYCIN TROUGH: 15.1 UG/ML (ref 10–20)
WBC # BLD: 8.1 K/UL (ref 4.8–10.8)

## 2019-07-10 PROCEDURE — 36415 COLL VENOUS BLD VENIPUNCTURE: CPT

## 2019-07-10 PROCEDURE — 99024 POSTOP FOLLOW-UP VISIT: CPT | Performed by: SURGERY

## 2019-07-10 PROCEDURE — 80048 BASIC METABOLIC PNL TOTAL CA: CPT

## 2019-07-10 PROCEDURE — 6370000000 HC RX 637 (ALT 250 FOR IP): Performed by: SURGERY

## 2019-07-10 PROCEDURE — 6360000002 HC RX W HCPCS: Performed by: SURGERY

## 2019-07-10 PROCEDURE — 85027 COMPLETE CBC AUTOMATED: CPT

## 2019-07-10 PROCEDURE — 2580000003 HC RX 258: Performed by: SURGERY

## 2019-07-10 PROCEDURE — 1210000000 HC MED SURG R&B

## 2019-07-10 PROCEDURE — 82948 REAGENT STRIP/BLOOD GLUCOSE: CPT

## 2019-07-10 PROCEDURE — 80202 ASSAY OF VANCOMYCIN: CPT

## 2019-07-10 PROCEDURE — 99232 SBSQ HOSP IP/OBS MODERATE 35: CPT | Performed by: HOSPITALIST

## 2019-07-10 PROCEDURE — 6370000000 HC RX 637 (ALT 250 FOR IP): Performed by: NURSE PRACTITIONER

## 2019-07-10 RX ADMIN — DAKIN'S SOLUTION 0.125% (QUARTER STRENGTH): 0.12 SOLUTION at 09:14

## 2019-07-10 RX ADMIN — COLLAGENASE SANTYL: 250 OINTMENT TOPICAL at 09:14

## 2019-07-10 RX ADMIN — HYDROCODONE BITARTRATE AND ACETAMINOPHEN 2 TABLET: 5; 325 TABLET ORAL at 14:44

## 2019-07-10 RX ADMIN — VANCOMYCIN HYDROCHLORIDE 2000 MG: 10 INJECTION, POWDER, LYOPHILIZED, FOR SOLUTION INTRAVENOUS at 03:49

## 2019-07-10 RX ADMIN — DAKIN'S SOLUTION 0.125% (QUARTER STRENGTH): 0.12 SOLUTION at 21:07

## 2019-07-10 RX ADMIN — INSULIN LISPRO 4 UNITS: 100 INJECTION, SOLUTION INTRAVENOUS; SUBCUTANEOUS at 12:04

## 2019-07-10 RX ADMIN — MUPIROCIN: 20 OINTMENT TOPICAL at 21:09

## 2019-07-10 RX ADMIN — VANCOMYCIN HYDROCHLORIDE 2000 MG: 10 INJECTION, POWDER, LYOPHILIZED, FOR SOLUTION INTRAVENOUS at 12:28

## 2019-07-10 RX ADMIN — INSULIN LISPRO 3 UNITS: 100 INJECTION, SOLUTION INTRAVENOUS; SUBCUTANEOUS at 21:03

## 2019-07-10 RX ADMIN — INSULIN LISPRO 10 UNITS: 100 INJECTION, SOLUTION INTRAVENOUS; SUBCUTANEOUS at 09:12

## 2019-07-10 RX ADMIN — INSULIN LISPRO 10 UNITS: 100 INJECTION, SOLUTION INTRAVENOUS; SUBCUTANEOUS at 18:17

## 2019-07-10 RX ADMIN — CEFEPIME 2 G: 2 INJECTION, POWDER, FOR SOLUTION INTRAVENOUS at 18:18

## 2019-07-10 RX ADMIN — LOSARTAN POTASSIUM 50 MG: 50 TABLET ORAL at 09:15

## 2019-07-10 RX ADMIN — MUPIROCIN: 20 OINTMENT TOPICAL at 16:10

## 2019-07-10 RX ADMIN — INSULIN GLARGINE 40 UNITS: 100 INJECTION, SOLUTION SUBCUTANEOUS at 21:03

## 2019-07-10 RX ADMIN — VANCOMYCIN HYDROCHLORIDE 2000 MG: 10 INJECTION, POWDER, LYOPHILIZED, FOR SOLUTION INTRAVENOUS at 19:57

## 2019-07-10 RX ADMIN — ENOXAPARIN SODIUM 40 MG: 40 INJECTION SUBCUTANEOUS at 09:13

## 2019-07-10 RX ADMIN — ASPIRIN 81 MG: 81 TABLET, COATED ORAL at 09:13

## 2019-07-10 RX ADMIN — Medication 10 ML: at 09:14

## 2019-07-10 RX ADMIN — INSULIN LISPRO 2 UNITS: 100 INJECTION, SOLUTION INTRAVENOUS; SUBCUTANEOUS at 18:17

## 2019-07-10 RX ADMIN — INSULIN LISPRO 10 UNITS: 100 INJECTION, SOLUTION INTRAVENOUS; SUBCUTANEOUS at 12:02

## 2019-07-10 RX ADMIN — INSULIN LISPRO 4 UNITS: 100 INJECTION, SOLUTION INTRAVENOUS; SUBCUTANEOUS at 09:12

## 2019-07-10 RX ADMIN — CEFEPIME 2 G: 2 INJECTION, POWDER, FOR SOLUTION INTRAVENOUS at 05:56

## 2019-07-10 ASSESSMENT — PAIN SCALES - GENERAL: PAINLEVEL_OUTOF10: 8

## 2019-07-10 NOTE — PROGRESS NOTES
losartan (COZAAR) tablet 50 mg  50 mg Oral Daily Danika Jeong MD   50 mg at 07/09/19 0824    ceFEPIme (MAXIPIME) 2 g in sterile water 20 mL IV syringe  2 g Intravenous Q12H Danika Jeong MD   2 g at 07/09/19 1748    glucose (GLUTOSE) 40 % oral gel 15 g  15 g Oral PRN Daniak Jeong MD        dextrose 50 % IV solution  12.5 g Intravenous PRN Danika Jeong MD        glucagon (rDNA) injection 1 mg  1 mg Intramuscular PRN Danika Jeong MD        dextrose 5 % solution  100 mL/hr Intravenous PRN Danika Jeong MD        insulin lispro (HUMALOG) injection vial 0-12 Units  0-12 Units Subcutaneous TID  Danika Jeong MD   4 Units at 07/09/19 1748    insulin lispro (HUMALOG) injection vial 0-6 Units  0-6 Units Subcutaneous Nightly Danika Jeong MD   3 Units at 07/08/19 2148    insulin glargine (LANTUS) injection vial 40 Units  40 Units Subcutaneous Nightly Danika Jeong MD   40 Units at 07/08/19 2145    insulin lispro (HUMALOG) injection vial 10 Units  10 Units Subcutaneous TID  Danika Jeong MD   10 Units at 07/09/19 1753    vancomycin (VANCOCIN) 2,000 mg in sodium chloride 0.9 % 500 mL IVPB  2,000 mg Intravenous Q8H Danika Jeong MD   Stopped at 07/09/19 1555    vancomycin (VANCOCIN) intermittent dosing (placeholder)   Other RX Placeholder Danika Jeong MD         DVT Prophylaxis: Lovenox 40 mg sq daily    Continuous Infusions:   sodium chloride 125 mL/hr at 07/09/19 1634    dextrose         Intake/Output Summary (Last 24 hours) at 7/9/2019 1905  Last data filed at 7/9/2019 1637  Gross per 24 hour   Intake 400 ml   Output 2600 ml   Net -2200 ml     CBC:   Recent Labs     07/08/19  1706 07/09/19  0807 07/09/19  1457   WBC 10.9* 8.1 9.3   HGB 14.4 13.0* 13.0*    214 244     BMP:  Recent Labs     07/08/19  1706 07/09/19  0807    137   K 4.2 4.3   CL 98 99   CO2 26 28   BUN 12 10   CREATININE 0.7 0.7   GLUCOSE 215* 309*     ABGs: No results found for: PHART, PO2ART, KPV8IFM  INR:   Recent Labs     07/08/19  1706   INR 1.04         Objective:   Vitals: /83   Pulse 86   Temp 97.8 °F (36.6 °C) (Temporal)   Resp 18   Ht 6' 3\" (1.905 m)   Wt (!) 374 lb 4 oz (169.8 kg)   SpO2 93%   BMI 46.78 kg/m²   General appearance: alert, appears stated age and cooperative  Skin: Skin color, texture, turgor normal.   HEENT: Head: Normocephalic, no lesions, without obvious abnormality.   Neck: no adenopathy, no carotid bruit, no JVD and supple, symmetrical, trachea midline  Lungs: clear to auscultation bilaterally  Heart: regular rate and rhythm, S1, S2 normal, no murmur, click, rub or gallop  Abdomen: soft, non-tender; bowel sounds normal; no masses,  no organomegaly  Extremities: dressing to left foot dry clean intact  Lymphatic: No significant lymph node enlargement papable  Neurologic: Mental status: Alert, oriented, thought content appropriate        Assessment & Plan:    · Left foot abscess- sp debridement today - on vanc and cefepime  · DM2- cont insulin   · HTN- home meds  · Class 3 severe obesity due to excess calories with serious comorbidity and body mass index (BMI) of 45.0 to 49.9 in adult     Disposition: home when better     [place] Potkian

## 2019-07-10 NOTE — PROGRESS NOTES
Vascular Surgery Rounding Note    7/10/2019  9:06 AM  Antibiotic day 3, Antibiotic Type vancomycin  Antibiotic day 3, Antibiotic Type cefipime    Post op day - debridement of left foot and right foot    Subjective- No complaints    Objective-   Invalid input(s): 24H    Intake/Output Summary (Last 24 hours) at 7/10/2019 0906  Last data filed at 7/10/2019 0558  Gross per 24 hour   Intake 5250 ml   Output 3700 ml   Net 1550 ml     No intake/output data recorded. CBC:   Recent Labs     07/09/19  0807 07/09/19  1457 07/10/19  0411   WBC 8.1 9.3 8.1   RBC 4.10* 3.99* 3.75*   HGB 13.0* 13.0* 11.8*   HCT 38.6* 38.2* 35.3*   MCV 94.1* 95.7* 94.1*   MCH 31.7* 32.6* 31.5*   MCHC 33.7 34.0 33.4   RDW 12.2 12.2 12.2    244 199   MPV 9.9 9.6 9.7      Last 3 CMP:   Recent Labs     07/08/19  1706 07/09/19  0807 07/10/19  0411    137 140   K 4.2 4.3 4.2   CL 98 99 100   CO2 26 28 28   BUN 12 10 9   CREATININE 0.7 0.7 0.9   GLUCOSE 215* 309* 265*   CALCIUM 9.3 9.0 8.4*   PROT 7.9  --   --    LABALBU 3.4*  --   --    BILITOT 0.5  --   --    ALKPHOS 71  --   --    AST 10  --   --    ALT 12  --   --            Physical Exam:  Awake, alert, appropriate Yes  /69   Pulse 73   Temp 97.5 °F (36.4 °C) (Oral)   Resp 18   Ht 6' 3\" (1.905 m)   Wt (!) 379 lb (171.9 kg)   SpO2 90%   BMI 47.37 kg/m²   Heart-Normal S1 and S2.  Regular rhythm. No murmurs, gallops, or rubs. Lung-negative  Neck-suppleno adenopathy, no carotid bruit, no JVD, supple, symmetrical, trachea midline and thyroid not enlarged, symmetric, no tenderness/mass/nodules  Abdomen-Abdomen soft, non-tender.  BS normal. No masses,  No organomegaly  Extremities-negative  Neurologic- alert, oriented, normal speech, no focal findings or movement disorder noted  Wounds open, will need to amputation left 5th toe and re debride wound tomorrow, will then put together plan, hopefully to salvage feet    I spent about 15 minutes with patient going over possible scenarios and fact that structure (Muscle/tendon and bone involved in his left foot wound) this will be hard to heal and HBO may be useful adjunct. Assessment/Plan  Multiple diabetic foot wound  1. Re-debride left foot tomorrow      Antibiotics continued after surgery due to:   Infection -  [x]  Abscess    []  Pneumonia    []  Aspiration Pneumonia    []  Sepsis    []  Cellulitis    [x]  Positive culture    []  UTI    []  Osteomyelitis    []  Fungal infection    []  H.pylori                              DVT prophylaxis: pharmacologic prophylaxis (with any of the following: enoxaparin (Lovenox) 40mg SQ 2h prior to surgery then QD)           Beta Blocker:  not indicated    Jimenez Catheter:  na

## 2019-07-11 ENCOUNTER — ANESTHESIA (OUTPATIENT)
Dept: OPERATING ROOM | Age: 39
DRG: 617 | End: 2019-07-11
Payer: COMMERCIAL

## 2019-07-11 ENCOUNTER — ANESTHESIA EVENT (OUTPATIENT)
Dept: OPERATING ROOM | Age: 39
DRG: 617 | End: 2019-07-11
Payer: COMMERCIAL

## 2019-07-11 VITALS
OXYGEN SATURATION: 92 % | DIASTOLIC BLOOD PRESSURE: 46 MMHG | TEMPERATURE: 98.6 F | SYSTOLIC BLOOD PRESSURE: 105 MMHG | RESPIRATION RATE: 12 BRPM

## 2019-07-11 LAB
BLOOD CULTURE, ROUTINE: ABNORMAL
BLOOD CULTURE, ROUTINE: ABNORMAL
GLUCOSE BLD-MCNC: 209 MG/DL (ref 70–99)
GLUCOSE BLD-MCNC: 296 MG/DL (ref 70–99)
GLUCOSE BLD-MCNC: 309 MG/DL (ref 70–99)
ORGANISM: ABNORMAL
PERFORMED ON: ABNORMAL

## 2019-07-11 PROCEDURE — 2709999900 HC NON-CHARGEABLE SUPPLY: Performed by: SURGERY

## 2019-07-11 PROCEDURE — 6360000002 HC RX W HCPCS: Performed by: NURSE ANESTHETIST, CERTIFIED REGISTERED

## 2019-07-11 PROCEDURE — 7100000000 HC PACU RECOVERY - FIRST 15 MIN: Performed by: SURGERY

## 2019-07-11 PROCEDURE — 2580000003 HC RX 258: Performed by: SURGERY

## 2019-07-11 PROCEDURE — 1210000000 HC MED SURG R&B

## 2019-07-11 PROCEDURE — 99232 SBSQ HOSP IP/OBS MODERATE 35: CPT | Performed by: HOSPITALIST

## 2019-07-11 PROCEDURE — 87040 BLOOD CULTURE FOR BACTERIA: CPT

## 2019-07-11 PROCEDURE — 0JDQ3ZZ EXTRACTION OF RIGHT FOOT SUBCUTANEOUS TISSUE AND FASCIA, PERCUTANEOUS APPROACH: ICD-10-PCS | Performed by: SURGERY

## 2019-07-11 PROCEDURE — 3600000004 HC SURGERY LEVEL 4 BASE: Performed by: SURGERY

## 2019-07-11 PROCEDURE — 82948 REAGENT STRIP/BLOOD GLUCOSE: CPT

## 2019-07-11 PROCEDURE — 6370000000 HC RX 637 (ALT 250 FOR IP): Performed by: SURGERY

## 2019-07-11 PROCEDURE — 28810 AMPUTATION TOE & METATARSAL: CPT | Performed by: SURGERY

## 2019-07-11 PROCEDURE — 7100000001 HC PACU RECOVERY - ADDTL 15 MIN: Performed by: SURGERY

## 2019-07-11 PROCEDURE — 6360000002 HC RX W HCPCS: Performed by: SURGERY

## 2019-07-11 PROCEDURE — 2500000003 HC RX 250 WO HCPCS: Performed by: NURSE ANESTHETIST, CERTIFIED REGISTERED

## 2019-07-11 PROCEDURE — 0Y6Y0Z0 DETACHMENT AT LEFT 5TH TOE, COMPLETE, OPEN APPROACH: ICD-10-PCS | Performed by: SURGERY

## 2019-07-11 PROCEDURE — 3700000001 HC ADD 15 MINUTES (ANESTHESIA): Performed by: SURGERY

## 2019-07-11 PROCEDURE — 3600000014 HC SURGERY LEVEL 4 ADDTL 15MIN: Performed by: SURGERY

## 2019-07-11 PROCEDURE — 88305 TISSUE EXAM BY PATHOLOGIST: CPT

## 2019-07-11 PROCEDURE — 3700000000 HC ANESTHESIA ATTENDED CARE: Performed by: SURGERY

## 2019-07-11 PROCEDURE — 36415 COLL VENOUS BLD VENIPUNCTURE: CPT

## 2019-07-11 PROCEDURE — 97597 DBRDMT OPN WND 1ST 20 CM/<: CPT | Performed by: SURGERY

## 2019-07-11 PROCEDURE — 0JDR3ZZ EXTRACTION OF LEFT FOOT SUBCUTANEOUS TISSUE AND FASCIA, PERCUTANEOUS APPROACH: ICD-10-PCS | Performed by: SURGERY

## 2019-07-11 RX ORDER — MIDAZOLAM HYDROCHLORIDE 1 MG/ML
2 INJECTION INTRAMUSCULAR; INTRAVENOUS
Status: DISCONTINUED | OUTPATIENT
Start: 2019-07-11 | End: 2019-07-11 | Stop reason: HOSPADM

## 2019-07-11 RX ORDER — ENALAPRILAT 2.5 MG/2ML
1.25 INJECTION INTRAVENOUS
Status: DISCONTINUED | OUTPATIENT
Start: 2019-07-11 | End: 2019-07-11 | Stop reason: HOSPADM

## 2019-07-11 RX ORDER — FENTANYL CITRATE 50 UG/ML
25 INJECTION, SOLUTION INTRAMUSCULAR; INTRAVENOUS
Status: DISCONTINUED | OUTPATIENT
Start: 2019-07-11 | End: 2019-07-11 | Stop reason: HOSPADM

## 2019-07-11 RX ORDER — ROCURONIUM BROMIDE 10 MG/ML
INJECTION, SOLUTION INTRAVENOUS PRN
Status: DISCONTINUED | OUTPATIENT
Start: 2019-07-11 | End: 2019-07-11 | Stop reason: SDUPTHER

## 2019-07-11 RX ORDER — SODIUM CHLORIDE 0.9 % (FLUSH) 0.9 %
10 SYRINGE (ML) INJECTION EVERY 12 HOURS SCHEDULED
Status: DISCONTINUED | OUTPATIENT
Start: 2019-07-11 | End: 2019-07-11 | Stop reason: HOSPADM

## 2019-07-11 RX ORDER — SODIUM HYPOCHLORITE 1.25 MG/ML
SOLUTION TOPICAL PRN
Status: DISCONTINUED | OUTPATIENT
Start: 2019-07-11 | End: 2019-07-11 | Stop reason: ALTCHOICE

## 2019-07-11 RX ORDER — MORPHINE SULFATE 2 MG/ML
4 INJECTION, SOLUTION INTRAMUSCULAR; INTRAVENOUS EVERY 5 MIN PRN
Status: DISCONTINUED | OUTPATIENT
Start: 2019-07-11 | End: 2019-07-11 | Stop reason: HOSPADM

## 2019-07-11 RX ORDER — METOCLOPRAMIDE HYDROCHLORIDE 5 MG/ML
10 INJECTION INTRAMUSCULAR; INTRAVENOUS
Status: COMPLETED | OUTPATIENT
Start: 2019-07-11 | End: 2019-07-11

## 2019-07-11 RX ORDER — LIDOCAINE HYDROCHLORIDE 10 MG/ML
1 INJECTION, SOLUTION EPIDURAL; INFILTRATION; INTRACAUDAL; PERINEURAL
Status: DISCONTINUED | OUTPATIENT
Start: 2019-07-11 | End: 2019-07-11 | Stop reason: HOSPADM

## 2019-07-11 RX ORDER — SODIUM HYPOCHLORITE 1.25 MG/ML
SOLUTION TOPICAL DAILY
Status: DISCONTINUED | OUTPATIENT
Start: 2019-07-11 | End: 2019-07-12

## 2019-07-11 RX ORDER — FENTANYL CITRATE 50 UG/ML
INJECTION, SOLUTION INTRAMUSCULAR; INTRAVENOUS PRN
Status: DISCONTINUED | OUTPATIENT
Start: 2019-07-11 | End: 2019-07-11 | Stop reason: SDUPTHER

## 2019-07-11 RX ORDER — PROMETHAZINE HYDROCHLORIDE 25 MG/ML
6.25 INJECTION, SOLUTION INTRAMUSCULAR; INTRAVENOUS
Status: DISCONTINUED | OUTPATIENT
Start: 2019-07-11 | End: 2019-07-11 | Stop reason: HOSPADM

## 2019-07-11 RX ORDER — LIDOCAINE HYDROCHLORIDE 10 MG/ML
INJECTION, SOLUTION INFILTRATION; PERINEURAL PRN
Status: DISCONTINUED | OUTPATIENT
Start: 2019-07-11 | End: 2019-07-11 | Stop reason: SDUPTHER

## 2019-07-11 RX ORDER — MIDAZOLAM HYDROCHLORIDE 1 MG/ML
INJECTION INTRAMUSCULAR; INTRAVENOUS PRN
Status: DISCONTINUED | OUTPATIENT
Start: 2019-07-11 | End: 2019-07-11 | Stop reason: SDUPTHER

## 2019-07-11 RX ORDER — DEXAMETHASONE SODIUM PHOSPHATE 10 MG/ML
INJECTION INTRAMUSCULAR; INTRAVENOUS PRN
Status: DISCONTINUED | OUTPATIENT
Start: 2019-07-11 | End: 2019-07-11 | Stop reason: SDUPTHER

## 2019-07-11 RX ORDER — SODIUM CHLORIDE 9 MG/ML
INJECTION, SOLUTION INTRAVENOUS CONTINUOUS
Status: DISCONTINUED | OUTPATIENT
Start: 2019-07-11 | End: 2019-07-11

## 2019-07-11 RX ORDER — SODIUM CHLORIDE, SODIUM LACTATE, POTASSIUM CHLORIDE, CALCIUM CHLORIDE 600; 310; 30; 20 MG/100ML; MG/100ML; MG/100ML; MG/100ML
INJECTION, SOLUTION INTRAVENOUS CONTINUOUS
Status: DISCONTINUED | OUTPATIENT
Start: 2019-07-11 | End: 2019-07-11

## 2019-07-11 RX ORDER — MORPHINE SULFATE 2 MG/ML
2 INJECTION, SOLUTION INTRAMUSCULAR; INTRAVENOUS EVERY 5 MIN PRN
Status: DISCONTINUED | OUTPATIENT
Start: 2019-07-11 | End: 2019-07-11 | Stop reason: HOSPADM

## 2019-07-11 RX ORDER — LABETALOL 20 MG/4 ML (5 MG/ML) INTRAVENOUS SYRINGE
5 EVERY 10 MIN PRN
Status: DISCONTINUED | OUTPATIENT
Start: 2019-07-11 | End: 2019-07-11 | Stop reason: HOSPADM

## 2019-07-11 RX ORDER — KETAMINE HYDROCHLORIDE 100 MG/ML
INJECTION, SOLUTION INTRAMUSCULAR; INTRAVENOUS PRN
Status: DISCONTINUED | OUTPATIENT
Start: 2019-07-11 | End: 2019-07-11 | Stop reason: SDUPTHER

## 2019-07-11 RX ORDER — ONDANSETRON 2 MG/ML
INJECTION INTRAMUSCULAR; INTRAVENOUS PRN
Status: DISCONTINUED | OUTPATIENT
Start: 2019-07-11 | End: 2019-07-11 | Stop reason: SDUPTHER

## 2019-07-11 RX ORDER — SUCCINYLCHOLINE/SOD CL,ISO/PF 100 MG/5ML
SYRINGE (ML) INTRAVENOUS PRN
Status: DISCONTINUED | OUTPATIENT
Start: 2019-07-11 | End: 2019-07-11 | Stop reason: SDUPTHER

## 2019-07-11 RX ORDER — PROPOFOL 10 MG/ML
INJECTION, EMULSION INTRAVENOUS PRN
Status: DISCONTINUED | OUTPATIENT
Start: 2019-07-11 | End: 2019-07-11 | Stop reason: SDUPTHER

## 2019-07-11 RX ORDER — FENTANYL CITRATE 50 UG/ML
50 INJECTION, SOLUTION INTRAMUSCULAR; INTRAVENOUS
Status: DISCONTINUED | OUTPATIENT
Start: 2019-07-11 | End: 2019-07-11 | Stop reason: HOSPADM

## 2019-07-11 RX ORDER — DIPHENHYDRAMINE HYDROCHLORIDE 50 MG/ML
12.5 INJECTION INTRAMUSCULAR; INTRAVENOUS
Status: DISCONTINUED | OUTPATIENT
Start: 2019-07-11 | End: 2019-07-11 | Stop reason: HOSPADM

## 2019-07-11 RX ORDER — HYDRALAZINE HYDROCHLORIDE 20 MG/ML
5 INJECTION INTRAMUSCULAR; INTRAVENOUS EVERY 10 MIN PRN
Status: DISCONTINUED | OUTPATIENT
Start: 2019-07-11 | End: 2019-07-11 | Stop reason: HOSPADM

## 2019-07-11 RX ORDER — MEPERIDINE HYDROCHLORIDE 50 MG/ML
12.5 INJECTION INTRAMUSCULAR; INTRAVENOUS; SUBCUTANEOUS EVERY 5 MIN PRN
Status: DISCONTINUED | OUTPATIENT
Start: 2019-07-11 | End: 2019-07-11 | Stop reason: HOSPADM

## 2019-07-11 RX ORDER — SODIUM CHLORIDE 0.9 % (FLUSH) 0.9 %
10 SYRINGE (ML) INJECTION PRN
Status: DISCONTINUED | OUTPATIENT
Start: 2019-07-11 | End: 2019-07-11 | Stop reason: HOSPADM

## 2019-07-11 RX ADMIN — MIDAZOLAM 2 MG: 1 INJECTION INTRAMUSCULAR; INTRAVENOUS at 11:11

## 2019-07-11 RX ADMIN — MUPIROCIN: 20 OINTMENT TOPICAL at 21:55

## 2019-07-11 RX ADMIN — VANCOMYCIN HYDROCHLORIDE 2000 MG: 10 INJECTION, POWDER, LYOPHILIZED, FOR SOLUTION INTRAVENOUS at 21:54

## 2019-07-11 RX ADMIN — METOCLOPRAMIDE 10 MG: 5 INJECTION, SOLUTION INTRAMUSCULAR; INTRAVENOUS at 13:08

## 2019-07-11 RX ADMIN — CEFEPIME 2 G: 2 INJECTION, POWDER, FOR SOLUTION INTRAVENOUS at 17:05

## 2019-07-11 RX ADMIN — SODIUM CHLORIDE: 9 INJECTION, SOLUTION INTRAVENOUS at 17:08

## 2019-07-11 RX ADMIN — LOSARTAN POTASSIUM 50 MG: 50 TABLET ORAL at 08:11

## 2019-07-11 RX ADMIN — LIDOCAINE HYDROCHLORIDE 50 MG: 10 INJECTION, SOLUTION INFILTRATION; PERINEURAL at 11:20

## 2019-07-11 RX ADMIN — DEXAMETHASONE SODIUM PHOSPHATE 10 MG: 10 INJECTION INTRAMUSCULAR; INTRAVENOUS at 11:27

## 2019-07-11 RX ADMIN — ASPIRIN 81 MG: 81 TABLET, COATED ORAL at 08:11

## 2019-07-11 RX ADMIN — VANCOMYCIN HYDROCHLORIDE 2000 MG: 10 INJECTION, POWDER, LYOPHILIZED, FOR SOLUTION INTRAVENOUS at 10:53

## 2019-07-11 RX ADMIN — INSULIN GLARGINE 40 UNITS: 100 INJECTION, SOLUTION SUBCUTANEOUS at 21:54

## 2019-07-11 RX ADMIN — Medication 30 MG: at 11:27

## 2019-07-11 RX ADMIN — SODIUM CHLORIDE: 9 INJECTION, SOLUTION INTRAVENOUS at 03:52

## 2019-07-11 RX ADMIN — ONDANSETRON HYDROCHLORIDE 4 MG: 2 INJECTION, SOLUTION INTRAMUSCULAR; INTRAVENOUS at 11:51

## 2019-07-11 RX ADMIN — CEFEPIME 2 G: 2 INJECTION, POWDER, FOR SOLUTION INTRAVENOUS at 06:10

## 2019-07-11 RX ADMIN — FENTANYL CITRATE 100 MCG: 50 INJECTION INTRAMUSCULAR; INTRAVENOUS at 11:20

## 2019-07-11 RX ADMIN — PROPOFOL 200 MG: 10 INJECTION, EMULSION INTRAVENOUS at 11:20

## 2019-07-11 RX ADMIN — Medication 200 MG: at 11:20

## 2019-07-11 RX ADMIN — VANCOMYCIN HYDROCHLORIDE 2000 MG: 10 INJECTION, POWDER, LYOPHILIZED, FOR SOLUTION INTRAVENOUS at 03:52

## 2019-07-11 RX ADMIN — INSULIN LISPRO 10 UNITS: 100 INJECTION, SOLUTION INTRAVENOUS; SUBCUTANEOUS at 17:04

## 2019-07-11 RX ADMIN — SODIUM CHLORIDE, SODIUM LACTATE, POTASSIUM CHLORIDE, AND CALCIUM CHLORIDE: 600; 310; 30; 20 INJECTION, SOLUTION INTRAVENOUS at 08:49

## 2019-07-11 RX ADMIN — INSULIN LISPRO 4 UNITS: 100 INJECTION, SOLUTION INTRAVENOUS; SUBCUTANEOUS at 21:54

## 2019-07-11 RX ADMIN — INSULIN LISPRO 3 UNITS: 100 INJECTION, SOLUTION INTRAVENOUS; SUBCUTANEOUS at 17:06

## 2019-07-11 RX ADMIN — ROCURONIUM BROMIDE 5 MG: 10 INJECTION INTRAVENOUS at 11:20

## 2019-07-11 RX ADMIN — FENTANYL CITRATE 50 MCG: 50 INJECTION INTRAMUSCULAR; INTRAVENOUS at 11:11

## 2019-07-11 ASSESSMENT — LIFESTYLE VARIABLES: SMOKING_STATUS: 0

## 2019-07-11 ASSESSMENT — PAIN SCALES - GENERAL: PAINLEVEL_OUTOF10: 0

## 2019-07-11 ASSESSMENT — ENCOUNTER SYMPTOMS: SHORTNESS OF BREATH: 0

## 2019-07-11 NOTE — ANESTHESIA PRE PROCEDURE
([facility] E11.628, L08.9    Essential hypertension I10    Abdominal wall cellulitis L03.311    Abscess of left foot L02.612       Past Medical History:        Diagnosis Date    Diabetes mellitus ([facility]     GERD (gastroesophageal reflux disease)     Hyperlipidemia     Hypertension     Sleep apnea     no longer present with weight loss 10-5-17       Past Surgical History:        Procedure Laterality Date    COLONOSCOPY      ENDOSCOPY, COLON, DIAGNOSTIC      INCISION AND DRAINAGE Bilateral 7/9/2019    NON-EXCISIONAL DEBRIDEMENT OF RIGHT MEDIAL HEEL WOUND 2.6CM X 1.6CM; NON-EXCISIONAL DEBRIDEMENT OF RIGHT LATERAL HEEL WOUND 3CM X 1CM; EXCISIONAL DEBRIDEMENT OF SKIN AND SUBCUTANEOUS TISSUE LEFT HEEL WOUND 5CM X 2.5CM; EXCISIONAL DEBRIDEMENT OF SKIN, SUBCUTANEOUS TISSUE, AND TENDON LEFT DISTAL FOOT 9CM X 5CM performed by Ida Henry MD at [facility] OR    HI EGD TRANSORAL BIOPSY SINGLE/MULTIPLE N/A 10/5/2017    Dr Taylor-w/dilation over wire, 47 [place]-esophageal stricture-Faith (-) chronic inflammation    TOE AMPUTATION Left 1/31/2019    TJR. Left second toe amputation at the metatarsophalangeal.    TONSILLECTOMY AND ADENOIDECTOMY         Social History:    Social History     Tobacco Use    Smoking status: Never Smoker    Smokeless tobacco: Never Used   Substance Use Topics    Alcohol use:  No                                Counseling given: Not Answered      Vital Signs (Current):   Vitals:    07/10/19 1831 07/11/19 0234 07/11/19 0442 07/11/19 0620   BP: 118/61 125/70  136/85   Pulse: 98 83  77   Resp: 18 16  18   Temp: 97.5 °F (36.4 °C) 96.7 °F (35.9 °C)  98.6 °F (37 °C)   TempSrc: Temporal Temporal     SpO2: 92% 94%  90%   Weight:   (!) 384 lb (174.2 kg)    Height:                                                  BP Readings from Last 3 Encounters:   07/11/19 136/85   07/09/19 (!) 95/58   06/24/19 135/82       NPO Status: Time of last liquid consumption: 0000                        Time of last solid reviewed  Rhythm: regular  Rate: normal           Beta Blocker:  Not on Beta Blocker         Neuro/Psych:   Negative Neuro/Psych ROS     (-) seizures, CVA and depression/anxiety            GI/Hepatic/Renal: Neg GI/Hepatic/Renal ROS  (+) GERD: well controlled, morbid obesity     (-) hiatal hernia      ROS comment: Dysphagia   ESOPHAGEAL STRICTURE . Endo/Other: Negative Endo/Other ROS   (+) DiabetesType II DM, poorly controlled, using insulin, . Pt had PAT visit. ROS comment: OSTEOMYELITIS, POOR CONT DM Abdominal:   (+) obese,     Abdomen: soft. Vascular:                                          Anesthesia Plan      general     ASA 3     (Iv zofran within 30 min of closing )  Induction: intravenous. BIS  MIPS: Postoperative opioids intended and Prophylactic antiemetics administered. Anesthetic plan and risks discussed with patient. Use of blood products discussed with patient whom. Plan discussed with CRNA.     Attending anesthesiologist reviewed and agrees with Pre Eval content              Wally Castano DO   7/11/2019

## 2019-07-12 LAB
ALBUMIN SERPL-MCNC: 3 G/DL (ref 3.5–5.2)
ALP BLD-CCNC: 59 U/L (ref 40–130)
ALT SERPL-CCNC: 13 U/L (ref 5–41)
ANION GAP SERPL CALCULATED.3IONS-SCNC: 11 MMOL/L (ref 7–19)
AST SERPL-CCNC: 9 U/L (ref 5–40)
BILIRUB SERPL-MCNC: 0.3 MG/DL (ref 0.2–1.2)
BUN BLDV-MCNC: 13 MG/DL (ref 6–20)
CALCIUM SERPL-MCNC: 8.1 MG/DL (ref 8.6–10)
CHLORIDE BLD-SCNC: 104 MMOL/L (ref 98–111)
CO2: 24 MMOL/L (ref 22–29)
CREAT SERPL-MCNC: 0.7 MG/DL (ref 0.5–1.2)
GFR NON-AFRICAN AMERICAN: >60
GLUCOSE BLD-MCNC: 203 MG/DL (ref 70–99)
GLUCOSE BLD-MCNC: 204 MG/DL (ref 70–99)
GLUCOSE BLD-MCNC: 205 MG/DL (ref 70–99)
GLUCOSE BLD-MCNC: 236 MG/DL (ref 70–99)
GLUCOSE BLD-MCNC: 258 MG/DL (ref 74–109)
HCT VFR BLD CALC: 32.3 % (ref 42–52)
HEMOGLOBIN: 10.9 G/DL (ref 14–18)
MCH RBC QN AUTO: 31.3 PG (ref 27–31)
MCHC RBC AUTO-ENTMCNC: 33.7 G/DL (ref 33–37)
MCV RBC AUTO: 92.8 FL (ref 80–94)
PDW BLD-RTO: 12 % (ref 11.5–14.5)
PERFORMED ON: ABNORMAL
PLATELET # BLD: 202 K/UL (ref 130–400)
PMV BLD AUTO: 9.9 FL (ref 9.4–12.4)
POTASSIUM SERPL-SCNC: 4 MMOL/L (ref 3.5–5)
RBC # BLD: 3.48 M/UL (ref 4.7–6.1)
SODIUM BLD-SCNC: 139 MMOL/L (ref 136–145)
TOTAL PROTEIN: 6.7 G/DL (ref 6.6–8.7)
VANCOMYCIN TROUGH: 15.9 UG/ML (ref 10–20)
WBC # BLD: 7.2 K/UL (ref 4.8–10.8)

## 2019-07-12 PROCEDURE — 6360000002 HC RX W HCPCS: Performed by: SURGERY

## 2019-07-12 PROCEDURE — 2580000003 HC RX 258: Performed by: SURGERY

## 2019-07-12 PROCEDURE — 99232 SBSQ HOSP IP/OBS MODERATE 35: CPT | Performed by: HOSPITALIST

## 2019-07-12 PROCEDURE — 80202 ASSAY OF VANCOMYCIN: CPT

## 2019-07-12 PROCEDURE — 6370000000 HC RX 637 (ALT 250 FOR IP): Performed by: SURGERY

## 2019-07-12 PROCEDURE — 80053 COMPREHEN METABOLIC PANEL: CPT

## 2019-07-12 PROCEDURE — 85027 COMPLETE CBC AUTOMATED: CPT

## 2019-07-12 PROCEDURE — 36415 COLL VENOUS BLD VENIPUNCTURE: CPT

## 2019-07-12 PROCEDURE — 82948 REAGENT STRIP/BLOOD GLUCOSE: CPT

## 2019-07-12 PROCEDURE — 1210000000 HC MED SURG R&B

## 2019-07-12 RX ORDER — SODIUM HYPOCHLORITE 1.25 MG/ML
SOLUTION TOPICAL 2 TIMES DAILY
Status: DISCONTINUED | OUTPATIENT
Start: 2019-07-12 | End: 2019-07-15 | Stop reason: HOSPADM

## 2019-07-12 RX ADMIN — VANCOMYCIN HYDROCHLORIDE 2000 MG: 10 INJECTION, POWDER, LYOPHILIZED, FOR SOLUTION INTRAVENOUS at 14:41

## 2019-07-12 RX ADMIN — DAKIN'S SOLUTION 0.125% (QUARTER STRENGTH): 0.12 SOLUTION at 22:00

## 2019-07-12 RX ADMIN — HYDROCODONE BITARTRATE AND ACETAMINOPHEN 2 TABLET: 5; 325 TABLET ORAL at 07:41

## 2019-07-12 RX ADMIN — COLLAGENASE SANTYL: 250 OINTMENT TOPICAL at 11:42

## 2019-07-12 RX ADMIN — INSULIN LISPRO 4 UNITS: 100 INJECTION, SOLUTION INTRAVENOUS; SUBCUTANEOUS at 18:54

## 2019-07-12 RX ADMIN — INSULIN LISPRO 10 UNITS: 100 INJECTION, SOLUTION INTRAVENOUS; SUBCUTANEOUS at 07:36

## 2019-07-12 RX ADMIN — INSULIN LISPRO 10 UNITS: 100 INJECTION, SOLUTION INTRAVENOUS; SUBCUTANEOUS at 13:00

## 2019-07-12 RX ADMIN — MUPIROCIN: 20 OINTMENT TOPICAL at 21:58

## 2019-07-12 RX ADMIN — INSULIN GLARGINE 40 UNITS: 100 INJECTION, SOLUTION SUBCUTANEOUS at 22:00

## 2019-07-12 RX ADMIN — INSULIN LISPRO 2 UNITS: 100 INJECTION, SOLUTION INTRAVENOUS; SUBCUTANEOUS at 22:05

## 2019-07-12 RX ADMIN — CEFEPIME 2 G: 2 INJECTION, POWDER, FOR SOLUTION INTRAVENOUS at 07:36

## 2019-07-12 RX ADMIN — LOSARTAN POTASSIUM 50 MG: 50 TABLET ORAL at 07:35

## 2019-07-12 RX ADMIN — Medication 10 ML: at 11:42

## 2019-07-12 RX ADMIN — DAKIN'S SOLUTION 0.125% (QUARTER STRENGTH): 0.12 SOLUTION at 11:43

## 2019-07-12 RX ADMIN — VANCOMYCIN HYDROCHLORIDE 2000 MG: 10 INJECTION, POWDER, LYOPHILIZED, FOR SOLUTION INTRAVENOUS at 05:16

## 2019-07-12 RX ADMIN — INSULIN LISPRO 4 UNITS: 100 INJECTION, SOLUTION INTRAVENOUS; SUBCUTANEOUS at 13:00

## 2019-07-12 RX ADMIN — INSULIN LISPRO 10 UNITS: 100 INJECTION, SOLUTION INTRAVENOUS; SUBCUTANEOUS at 18:53

## 2019-07-12 RX ADMIN — CEFEPIME 2 G: 2 INJECTION, POWDER, FOR SOLUTION INTRAVENOUS at 21:57

## 2019-07-12 RX ADMIN — COLLAGENASE SANTYL: 250 OINTMENT TOPICAL at 21:59

## 2019-07-12 RX ADMIN — VANCOMYCIN HYDROCHLORIDE 2000 MG: 10 INJECTION, POWDER, LYOPHILIZED, FOR SOLUTION INTRAVENOUS at 21:58

## 2019-07-12 RX ADMIN — INSULIN LISPRO 4 UNITS: 100 INJECTION, SOLUTION INTRAVENOUS; SUBCUTANEOUS at 07:35

## 2019-07-12 RX ADMIN — DAKIN'S SOLUTION 0.125% (QUARTER STRENGTH): 0.12 SOLUTION at 09:00

## 2019-07-12 RX ADMIN — ASPIRIN 81 MG: 81 TABLET, COATED ORAL at 07:35

## 2019-07-12 RX ADMIN — MUPIROCIN: 20 OINTMENT TOPICAL at 11:41

## 2019-07-12 ASSESSMENT — PAIN SCALES - GENERAL: PAINLEVEL_OUTOF10: 7

## 2019-07-12 NOTE — PROGRESS NOTES
for: PHART, PO2ART, NID0AMQ  INR:   No results for input(s): INR in the last 72 hours. Objective:   Vitals: /84   Pulse 73   Temp 98.2 °F (36.8 °C)   Resp 20   Ht 6' 3\" (1.905 m)   Wt (!) 384 lb (174.2 kg)   SpO2 93%   BMI 48.00 kg/m²   General appearance: alert, appears stated age and cooperative  Skin: Skin color, texture, turgor normal.   HEENT: Head: Normocephalic, no lesions, without obvious abnormality.   Neck: no adenopathy, no carotid bruit, no JVD and supple, symmetrical, trachea midline  Lungs: clear to auscultation bilaterally  Heart: regular rate and rhythm, S1, S2 normal, no murmur, click, rub or gallop  Abdomen: soft, non-tender; bowel sounds normal; no masses,  no organomegaly  Extremities: dressing to left foot dry clean intact  Lymphatic: No significant lymph node enlargement papable  Neurologic: Mental status: Alert, oriented, thought content appropriate        Assessment & Plan:    · Left foot abscess- sp debridement- on vanc and cefepime  · DM2- uncontrolled- adjust insulin   · HTN- home meds  · Class 3 severe obesity due to excess calories with serious comorbidity and body mass index (BMI) of 45.0 to 49.9 in adult     Disposition: home tomorrow - continue IV ab     Jose Body

## 2019-07-12 NOTE — PROGRESS NOTES
in 2 months  · Ideal Body Wt: 196 lb (88.9 kg), % Ideal Body 191%  · BMI Classification: BMI > or equal to 40.0 Obese Class III    Nutrition Interventions:   Modify current diet  Continued Inpatient Monitoring    Nutrition Evaluation:   · Evaluation: Progressing toward goals   · Goals: meet nutritional needs through po intake. Wound healing.     · Monitoring: Meal Intake, Diet Tolerance, Skin Integrity, Wound Healing, Weight, Pertinent Labs      Electronically signed by Georges Rowe MS, RD, LD on 7/12/19 at 12:38 PM    Contact Number: [phone]

## 2019-07-12 NOTE — PROGRESS NOTES
Vascular Surgery Rounding Note                                                     Dr.Timothy Soliman    7/12/2019  8:48 AM    Antibiotic- Maxipime 2gm IVPB q12h                     Vancomycin 2,000mg IVPB q8h      Post op day- #3 PROCEDURE PERFORMED:  1. Nonexcisional debridement of right heel wounds, medial wound 2.6 cm  x 1.6 cm, lateral plantar wound 3 cm x 1.0 cm, total debrided area of  right foot 4.56 sq. cm.  2.  Excisional debridement of skin and subcutaneous tissue, left heel  wound, 5 cm x 2.5 cm (7.5 cm2). 3.  Incision, drainage and debridement of left forefoot wound of skin,  subcutaneous tissue and tendon (muscle) 9 cm x 5 cm, approximately 1.5  cm in depth (45 cm2).       Post op day - #1 PROCEDURE PERFORMED:  1. Amputation of the left fifth toe at the transmetatarsal level. 2.  Nonexcisional debridement of right medial heel wound, 2 cm x 1 cm;  nonexcisional debridement of the right heel wound, 3 cm x 1 cm; and  nonexcisional debridement of the left heel wound 4 cm x 2 cm; total  nonexcisional debridement area 13 sq. cm. Subjective- stating left forefoot wound is painful to touch today    Objective-   Invalid input(s): 24H    Intake/Output Summary (Last 24 hours) at 7/12/2019 0848  Last data filed at 7/12/2019 0727  Gross per 24 hour   Intake 900 ml   Output 2300 ml   Net -1400 ml     In: -   Out: 1300 [Urine:1300]    Physical Exam:  Awake, alert, appropriate Yes  /84   Pulse 73   Temp 98.2 °F (36.8 °C)   Resp 20   Ht 6' 3\" (1.905 m)   Wt (!) 384 lb (174.2 kg)   SpO2 93%   BMI 48.00 kg/m²   Heart-Regular rate and rhythm  Lung-Clear bilaterally anterior  Extremities-Feet are warm to touch/pink color  Neurologic- alert, oriented, normal speech, no focal findings or movement disorder noted  Wound surgical incisions-left lateral forefoot wound beefy red color, small area of bone noted in would bed.  Left lateral heel plantar heel wound clean and

## 2019-07-13 LAB
ANAEROBIC CULTURE: ABNORMAL
ANAEROBIC CULTURE: ABNORMAL
CULTURE SURGICAL: ABNORMAL
CULTURE, BLOOD 2: NORMAL
GLUCOSE BLD-MCNC: 153 MG/DL (ref 70–99)
GLUCOSE BLD-MCNC: 169 MG/DL (ref 70–99)
GLUCOSE BLD-MCNC: 188 MG/DL (ref 70–99)
GLUCOSE BLD-MCNC: 229 MG/DL (ref 70–99)
GRAM STAIN RESULT: ABNORMAL
GRAM STAIN RESULT: ABNORMAL
ORGANISM: ABNORMAL
PERFORMED ON: ABNORMAL

## 2019-07-13 PROCEDURE — 2580000003 HC RX 258: Performed by: SURGERY

## 2019-07-13 PROCEDURE — 6370000000 HC RX 637 (ALT 250 FOR IP): Performed by: SURGERY

## 2019-07-13 PROCEDURE — 1210000000 HC MED SURG R&B

## 2019-07-13 PROCEDURE — 82948 REAGENT STRIP/BLOOD GLUCOSE: CPT

## 2019-07-13 PROCEDURE — 2580000003 HC RX 258: Performed by: INTERNAL MEDICINE

## 2019-07-13 PROCEDURE — 99232 SBSQ HOSP IP/OBS MODERATE 35: CPT | Performed by: HOSPITALIST

## 2019-07-13 PROCEDURE — 6360000002 HC RX W HCPCS: Performed by: SURGERY

## 2019-07-13 PROCEDURE — 6360000002 HC RX W HCPCS: Performed by: INTERNAL MEDICINE

## 2019-07-13 RX ADMIN — INSULIN GLARGINE 40 UNITS: 100 INJECTION, SOLUTION SUBCUTANEOUS at 21:16

## 2019-07-13 RX ADMIN — Medication 10 ML: at 08:03

## 2019-07-13 RX ADMIN — MUPIROCIN: 20 OINTMENT TOPICAL at 10:43

## 2019-07-13 RX ADMIN — Medication 10 ML: at 21:17

## 2019-07-13 RX ADMIN — SODIUM CHLORIDE 2 G: 9 INJECTION, SOLUTION INTRAVENOUS at 17:58

## 2019-07-13 RX ADMIN — INSULIN LISPRO 10 UNITS: 100 INJECTION, SOLUTION INTRAVENOUS; SUBCUTANEOUS at 08:07

## 2019-07-13 RX ADMIN — INSULIN LISPRO 1 UNITS: 100 INJECTION, SOLUTION INTRAVENOUS; SUBCUTANEOUS at 21:16

## 2019-07-13 RX ADMIN — INSULIN LISPRO 2 UNITS: 100 INJECTION, SOLUTION INTRAVENOUS; SUBCUTANEOUS at 16:56

## 2019-07-13 RX ADMIN — INSULIN LISPRO 10 UNITS: 100 INJECTION, SOLUTION INTRAVENOUS; SUBCUTANEOUS at 12:55

## 2019-07-13 RX ADMIN — COLLAGENASE SANTYL: 250 OINTMENT TOPICAL at 10:43

## 2019-07-13 RX ADMIN — DAKIN'S SOLUTION 0.125% (QUARTER STRENGTH): 0.12 SOLUTION at 10:43

## 2019-07-13 RX ADMIN — CEFEPIME 2 G: 2 INJECTION, POWDER, FOR SOLUTION INTRAVENOUS at 05:53

## 2019-07-13 RX ADMIN — INSULIN LISPRO 4 UNITS: 100 INJECTION, SOLUTION INTRAVENOUS; SUBCUTANEOUS at 08:06

## 2019-07-13 RX ADMIN — INSULIN LISPRO 2 UNITS: 100 INJECTION, SOLUTION INTRAVENOUS; SUBCUTANEOUS at 12:54

## 2019-07-13 RX ADMIN — VANCOMYCIN HYDROCHLORIDE 2000 MG: 10 INJECTION, POWDER, LYOPHILIZED, FOR SOLUTION INTRAVENOUS at 05:54

## 2019-07-13 RX ADMIN — VANCOMYCIN HYDROCHLORIDE 2000 MG: 10 INJECTION, POWDER, LYOPHILIZED, FOR SOLUTION INTRAVENOUS at 14:10

## 2019-07-13 RX ADMIN — LOSARTAN POTASSIUM 50 MG: 50 TABLET ORAL at 08:03

## 2019-07-13 RX ADMIN — ASPIRIN 81 MG: 81 TABLET, COATED ORAL at 08:03

## 2019-07-13 RX ADMIN — ENOXAPARIN SODIUM 40 MG: 40 INJECTION SUBCUTANEOUS at 08:03

## 2019-07-13 ASSESSMENT — PAIN DESCRIPTION - DESCRIPTORS: DESCRIPTORS: THROBBING

## 2019-07-13 ASSESSMENT — PAIN DESCRIPTION - ORIENTATION: ORIENTATION: LEFT

## 2019-07-13 ASSESSMENT — PAIN DESCRIPTION - LOCATION: LOCATION: FOOT

## 2019-07-13 ASSESSMENT — PAIN SCALES - GENERAL: PAINLEVEL_OUTOF10: 6

## 2019-07-13 ASSESSMENT — PAIN DESCRIPTION - FREQUENCY: FREQUENCY: INTERMITTENT

## 2019-07-13 ASSESSMENT — PAIN DESCRIPTION - PAIN TYPE: TYPE: ACUTE PAIN

## 2019-07-13 NOTE — CONSULTS
Consult Dietitian  Consult performed by: Joe Chappell MS, RD, LD  Consult ordered by: Dariusz Rubin MD  Assessment/Recommendations: Received consult for low Desmond Risk score = 18. At present time pt is NPO for debridement. Pt is at nutritional risk d/t multiple wounds, surgery and obesity. Will follow for po intake and healing.   Thank you for referral.  Electronically signed by Joe Chappell MS, RD, LD on 7/9/19 at 1:19 PM
HYDROcodone 5 mg - acetaminophen **OR** HYDROcodone 5 mg - acetaminophen, ondansetron, metoprolol tartrate, cloNIDine, morphine **OR** morphine, glucose, dextrose, glucagon (rDNA), dextrose    Allergies: Allergies   Allergen Reactions    Codeine     Januvia [Sitagliptin]     Niacin And Related      Past Medical History: Diabetes mellitus. Gastroesophageal reflux disease. Hyperlipidemia. Hypertension. Sleep apnea. Past Surgical History: Toe amputation. Tonsillectomy and adenoidectomy. EGD. Colonoscopy. Social History: No tobacco, alcohol, or illicit drug use. Works as a . He works on large equipment. Family History: Father history of cancer. Father history high blood pressure. Colon cancer maternal uncle. Mother history of diabetes. Exposure History: No close contacts have been ill. Review of Systems:   No nausea or vomiting  No cough, chest pain, shortness of breath  No urinary symptoms  No rash  No abdominal pain or diarrhea    Vital Signs:  /83   Pulse 71   Temp 97.7 °F (36.5 °C) (Temporal)   Resp 18   Ht 6' 3\" (1.905 m)   Wt (!) 382 lb (173.3 kg)   SpO2 91%   BMI 47.75 kg/m²  Temp (24hrs), Av.6 °F (36.4 °C), Min:97.5 °F (36.4 °C), Max:97.8 °F (36.6 °C)    Physical Exam:   Vital signs reviewed. General alert, pleasant, no distress  Lungs clear to auscultation no crackles  Heart regular rhythm without murmur  Abdomen soft and nontender  Examination of the left foot reveals previous amputation of second toe. Well-healed remote operative incision. Left fifth toe has been amputated. Wound VAC present over the wound. No surrounding erythema. No lymphangitis. Right foot has somewhat of a linear split on the plantar aspect of the calcaneal area. No deeper tracking. No purulence. No cellulitis. Medial distal left foot with a small superficial area of opening. He has felt and foam in place and has been offloading.   Peripheral IV left hand without

## 2019-07-13 NOTE — PROGRESS NOTES
7/9/2019 11:03 AM      XR FOOT RIGHT (MIN 3 VIEWS)   Final Result   1. No acute osseous abnormality identified. No radiographic evidence   of osteomyelitis. Signed by Dr Carmen Pederson on 7/9/2019 11:04 AM      VL Lower Extremity Arterial Segmental Pressures W Ppg   Final Result        Micro:   Blood Culture, Routine   Date Value Ref Range Status   07/11/2019   Preliminary    No Growth to date. Any change in status will be called.   , No components found for: LABURINE  Patient Active Problem List    Diagnosis Date Noted    Abscess of left foot     Diabetic foot infection ([address] 07/08/2019    Essential hypertension 07/08/2019    Abdominal wall cellulitis 07/08/2019    Midfoot ulceration, left, with fat layer exposed ([address] 06/24/2019    Ulcer of right heel, with fat layer exposed ([address] 06/24/2019    Osteomyelitis of toe of left foot ([address] 01/31/2019    Type 2 diabetes mellitus with foot ulcer, with long-term current use of insulin ([address] 01/29/2019    Class 3 severe obesity due to excess calories with serious comorbidity and body mass index (BMI) of 45.0 to 49.9 in [facility]) 01/29/2019    Diabetic ulcer of toe of left foot associated with type 2 diabetes mellitus, with fat layer exposed ([address] 01/22/2019    Non-pressure ulcer of toe ([address] 01/22/2019    Cellulitis of left toe 01/22/2019    Mixed hyperlipidemia 06/14/2018    Esophageal dysphagia 09/11/2017    Indigestion 09/11/2017    Belching 09/11/2017       Assessment/plan:   Principal Problem:    Diabetic foot infection ([address]  Active Problems:    Mixed hyperlipidemia    Type 2 diabetes mellitus with foot ulcer, with long-term current use of insulin (HCC)    Class 3 severe obesity due to excess calories with serious comorbidity and body mass index (BMI) of 45.0 to 49.9 in [facility])    Essential hypertension    Abdominal wall cellulitis    Abscess of left foot  Resolved Problems:    * No resolved hospital problems.  *      Plan:   Vascular surgery

## 2019-07-14 LAB
ANION GAP SERPL CALCULATED.3IONS-SCNC: 12 MMOL/L (ref 7–19)
BASOPHILS ABSOLUTE: 0 K/UL (ref 0–0.2)
BASOPHILS RELATIVE PERCENT: 0.4 % (ref 0–1)
BUN BLDV-MCNC: 12 MG/DL (ref 6–20)
CALCIUM SERPL-MCNC: 8.7 MG/DL (ref 8.6–10)
CHLORIDE BLD-SCNC: 103 MMOL/L (ref 98–111)
CO2: 26 MMOL/L (ref 22–29)
CREAT SERPL-MCNC: 0.7 MG/DL (ref 0.5–1.2)
EOSINOPHILS ABSOLUTE: 0.2 K/UL (ref 0–0.6)
EOSINOPHILS RELATIVE PERCENT: 2.3 % (ref 0–5)
GFR NON-AFRICAN AMERICAN: >60
GLUCOSE BLD-MCNC: 137 MG/DL (ref 70–99)
GLUCOSE BLD-MCNC: 144 MG/DL (ref 70–99)
GLUCOSE BLD-MCNC: 189 MG/DL (ref 70–99)
GLUCOSE BLD-MCNC: 198 MG/DL (ref 74–109)
GLUCOSE BLD-MCNC: 222 MG/DL (ref 70–99)
HCT VFR BLD CALC: 35 % (ref 42–52)
HEMOGLOBIN: 11.8 G/DL (ref 14–18)
LYMPHOCYTES ABSOLUTE: 2.6 K/UL (ref 1.1–4.5)
LYMPHOCYTES RELATIVE PERCENT: 37.6 % (ref 20–40)
MCH RBC QN AUTO: 32.1 PG (ref 27–31)
MCHC RBC AUTO-ENTMCNC: 33.7 G/DL (ref 33–37)
MCV RBC AUTO: 95.1 FL (ref 80–94)
MONOCYTES ABSOLUTE: 0.5 K/UL (ref 0–0.9)
MONOCYTES RELATIVE PERCENT: 7.1 % (ref 0–10)
NEUTROPHILS ABSOLUTE: 3.6 K/UL (ref 1.5–7.5)
NEUTROPHILS RELATIVE PERCENT: 52.2 % (ref 50–65)
PDW BLD-RTO: 12.2 % (ref 11.5–14.5)
PERFORMED ON: ABNORMAL
PLATELET # BLD: 236 K/UL (ref 130–400)
PMV BLD AUTO: 9.6 FL (ref 9.4–12.4)
POTASSIUM SERPL-SCNC: 3.9 MMOL/L (ref 3.5–5)
RBC # BLD: 3.68 M/UL (ref 4.7–6.1)
SODIUM BLD-SCNC: 141 MMOL/L (ref 136–145)
WBC # BLD: 6.9 K/UL (ref 4.8–10.8)

## 2019-07-14 PROCEDURE — 80048 BASIC METABOLIC PNL TOTAL CA: CPT

## 2019-07-14 PROCEDURE — 85025 COMPLETE CBC W/AUTO DIFF WBC: CPT

## 2019-07-14 PROCEDURE — 99232 SBSQ HOSP IP/OBS MODERATE 35: CPT | Performed by: HOSPITALIST

## 2019-07-14 PROCEDURE — 99024 POSTOP FOLLOW-UP VISIT: CPT | Performed by: SURGERY

## 2019-07-14 PROCEDURE — 6360000002 HC RX W HCPCS: Performed by: INTERNAL MEDICINE

## 2019-07-14 PROCEDURE — 6370000000 HC RX 637 (ALT 250 FOR IP): Performed by: SURGERY

## 2019-07-14 PROCEDURE — 82948 REAGENT STRIP/BLOOD GLUCOSE: CPT

## 2019-07-14 PROCEDURE — 1210000000 HC MED SURG R&B

## 2019-07-14 PROCEDURE — 36415 COLL VENOUS BLD VENIPUNCTURE: CPT

## 2019-07-14 PROCEDURE — 6360000002 HC RX W HCPCS: Performed by: SURGERY

## 2019-07-14 PROCEDURE — 2580000003 HC RX 258: Performed by: INTERNAL MEDICINE

## 2019-07-14 PROCEDURE — 2580000003 HC RX 258: Performed by: SURGERY

## 2019-07-14 RX ORDER — CEPHALEXIN 500 MG/1
1000 CAPSULE ORAL EVERY 12 HOURS SCHEDULED
Status: DISCONTINUED | OUTPATIENT
Start: 2019-07-15 | End: 2019-07-15 | Stop reason: HOSPADM

## 2019-07-14 RX ADMIN — INSULIN LISPRO 2 UNITS: 100 INJECTION, SOLUTION INTRAVENOUS; SUBCUTANEOUS at 13:13

## 2019-07-14 RX ADMIN — SODIUM CHLORIDE 2 G: 9 INJECTION, SOLUTION INTRAVENOUS at 17:58

## 2019-07-14 RX ADMIN — Medication 10 ML: at 22:37

## 2019-07-14 RX ADMIN — DAKIN'S SOLUTION 0.125% (QUARTER STRENGTH): 0.12 SOLUTION at 08:00

## 2019-07-14 RX ADMIN — ENOXAPARIN SODIUM 40 MG: 40 INJECTION SUBCUTANEOUS at 07:59

## 2019-07-14 RX ADMIN — COLLAGENASE SANTYL: 250 OINTMENT TOPICAL at 08:11

## 2019-07-14 RX ADMIN — MUPIROCIN: 20 OINTMENT TOPICAL at 22:39

## 2019-07-14 RX ADMIN — ACETAMINOPHEN 650 MG: 325 TABLET ORAL at 16:57

## 2019-07-14 RX ADMIN — Medication 10 ML: at 02:13

## 2019-07-14 RX ADMIN — INSULIN LISPRO 10 UNITS: 100 INJECTION, SOLUTION INTRAVENOUS; SUBCUTANEOUS at 17:59

## 2019-07-14 RX ADMIN — SODIUM CHLORIDE 2 G: 9 INJECTION, SOLUTION INTRAVENOUS at 02:11

## 2019-07-14 RX ADMIN — DAKIN'S SOLUTION 0.125% (QUARTER STRENGTH) 473 ML: 0.12 SOLUTION at 22:39

## 2019-07-14 RX ADMIN — SODIUM CHLORIDE 2 G: 9 INJECTION, SOLUTION INTRAVENOUS at 10:42

## 2019-07-14 RX ADMIN — INSULIN LISPRO 4 UNITS: 100 INJECTION, SOLUTION INTRAVENOUS; SUBCUTANEOUS at 08:07

## 2019-07-14 RX ADMIN — COLLAGENASE SANTYL: 250 OINTMENT TOPICAL at 02:12

## 2019-07-14 RX ADMIN — Medication 10 ML: at 08:10

## 2019-07-14 RX ADMIN — MUPIROCIN: 20 OINTMENT TOPICAL at 02:13

## 2019-07-14 RX ADMIN — DAKIN'S SOLUTION 0.125% (QUARTER STRENGTH) 473 ML: 0.12 SOLUTION at 02:11

## 2019-07-14 RX ADMIN — MUPIROCIN: 20 OINTMENT TOPICAL at 08:11

## 2019-07-14 RX ADMIN — LOSARTAN POTASSIUM 50 MG: 50 TABLET ORAL at 07:59

## 2019-07-14 RX ADMIN — ASPIRIN 81 MG: 81 TABLET, COATED ORAL at 07:59

## 2019-07-14 RX ADMIN — INSULIN GLARGINE 40 UNITS: 100 INJECTION, SOLUTION SUBCUTANEOUS at 22:29

## 2019-07-14 RX ADMIN — INSULIN LISPRO 10 UNITS: 100 INJECTION, SOLUTION INTRAVENOUS; SUBCUTANEOUS at 13:12

## 2019-07-14 RX ADMIN — INSULIN LISPRO 1 UNITS: 100 INJECTION, SOLUTION INTRAVENOUS; SUBCUTANEOUS at 22:29

## 2019-07-14 RX ADMIN — INSULIN LISPRO 10 UNITS: 100 INJECTION, SOLUTION INTRAVENOUS; SUBCUTANEOUS at 08:06

## 2019-07-14 ASSESSMENT — PAIN SCALES - GENERAL: PAINLEVEL_OUTOF10: 5

## 2019-07-14 NOTE — PROGRESS NOTES
ceFAZolin  2 g Intravenous Q8H    enoxaparin  40 mg Subcutaneous Daily    sodium hypochlorite   Irrigation BID    collagenase   Topical Daily    mupirocin   Topical BID    sodium chloride flush  10 mL Intravenous 2 times per day    aspirin  81 mg Oral Daily    losartan  50 mg Oral Daily    insulin lispro  0-12 Units Subcutaneous TID WC    insulin lispro  0-6 Units Subcutaneous Nightly    insulin glargine  40 Units Subcutaneous Nightly    insulin lispro  10 Units Subcutaneous TID WC     sodium chloride flush, acetaminophen, HYDROcodone 5 mg - acetaminophen **OR** HYDROcodone 5 mg - acetaminophen, ondansetron, metoprolol tartrate, cloNIDine, morphine **OR** morphine, glucose, dextrose, glucagon (rDNA), dextrose  DIET CARB CONTROL;         Lab and other Data:     Recent Labs     07/12/19 0638 07/14/19  0418   WBC 7.2 6.9   HGB 10.9* 11.8*    236     Recent Labs     07/12/19 0638 07/14/19  0418    141   K 4.0 3.9    103   CO2 24 26   BUN 13 12   CREATININE 0.7 0.7   GLUCOSE 258* 198*     Recent Labs     07/12/19  0638   AST 9   ALT 13   BILITOT 0.3   ALKPHOS 59     Troponin T: No results for input(s): TROPONINI in the last 72 hours. Pro-BNP: No results for input(s): BNP in the last 72 hours. INR: No results for input(s): INR in the last 72 hours. ABGs: No results found for: PHART, PO2ART, BXT6EWB  UA:No results for input(s): NITRITE, COLORU, PHUR, LABCAST, WBCUA, RBCUA, MUCUS, TRICHOMONAS, YEAST, BACTERIA, CLARITYU, SPECGRAV, LEUKOCYTESUR, UROBILINOGEN, BILIRUBINUR, BLOODU, GLUCOSEU, AMORPHOUS in the last 72 hours. Invalid input(s): KETONESU    Imaging:   XR FOOT LEFT (MIN 3 VIEWS)   Final Result   1. New soft tissue swelling and subcutaneous air within the lateral   soft tissues adjacent the left fifth MTP joint. No radiographic   evidence of acute osteomyelitis. 2. Left second toe amputation.    Signed by Dr Clemente Santos on 7/9/2019 11:03 AM      XR FOOT RIGHT (MIN 3 VIEWS) Final Result   1. No acute osseous abnormality identified. No radiographic evidence   of osteomyelitis. Signed by Dr Eladio Gomez on 7/9/2019 11:04 AM      VL Lower Extremity Arterial Segmental Pressures W Ppg   Final Result        Micro:   Blood Culture, Routine   Date Value Ref Range Status   07/11/2019   Preliminary    No Growth to date. Any change in status will be called.   , No components found for: LABURINE  Patient Active Problem List    Diagnosis Date Noted    Abscess of left foot     Diabetic foot infection ([address] 07/08/2019    Essential hypertension 07/08/2019    Abdominal wall cellulitis 07/08/2019    Midfoot ulceration, left, with fat layer exposed ([address] 06/24/2019    Ulcer of right heel, with fat layer exposed ([address] 06/24/2019    Osteomyelitis of toe of left foot ([address] 01/31/2019    Type 2 diabetes mellitus with foot ulcer, with long-term current use of insulin ([address] 01/29/2019    Class 3 severe obesity due to excess calories with serious comorbidity and body mass index (BMI) of 45.0 to 49.9 in [facility]) 01/29/2019    Diabetic ulcer of toe of left foot associated with type 2 diabetes mellitus, with fat layer exposed ([address] 01/22/2019    Non-pressure ulcer of toe ([address] 01/22/2019    Cellulitis of left toe 01/22/2019    Mixed hyperlipidemia 06/14/2018    Esophageal dysphagia 09/11/2017    Indigestion 09/11/2017    Belching 09/11/2017       Assessment/plan:   Principal Problem:    Diabetic foot infection ([address]  Active Problems:    Mixed hyperlipidemia    Type 2 diabetes mellitus with foot ulcer, with long-term current use of insulin (HCC)    Class 3 severe obesity due to excess calories with serious comorbidity and body mass index (BMI) of 45.0 to 49.9 in [facility])    Essential hypertension    Abdominal wall cellulitis    Abscess of left foot  Resolved Problems:    * No resolved hospital problems.  *      Plan:   Vascular surgery following, wound vac in place, awaiting setup for

## 2019-07-15 VITALS
SYSTOLIC BLOOD PRESSURE: 147 MMHG | TEMPERATURE: 96.7 F | RESPIRATION RATE: 20 BRPM | HEIGHT: 75 IN | WEIGHT: 315 LBS | OXYGEN SATURATION: 96 % | DIASTOLIC BLOOD PRESSURE: 87 MMHG | BODY MASS INDEX: 39.17 KG/M2 | HEART RATE: 69 BPM

## 2019-07-15 LAB
ANION GAP SERPL CALCULATED.3IONS-SCNC: 13 MMOL/L (ref 7–19)
BASOPHILS ABSOLUTE: 0 K/UL (ref 0–0.2)
BASOPHILS RELATIVE PERCENT: 0.4 % (ref 0–1)
BUN BLDV-MCNC: 14 MG/DL (ref 6–20)
CALCIUM SERPL-MCNC: 9 MG/DL (ref 8.6–10)
CHLORIDE BLD-SCNC: 102 MMOL/L (ref 98–111)
CO2: 27 MMOL/L (ref 22–29)
CREAT SERPL-MCNC: 0.8 MG/DL (ref 0.5–1.2)
EOSINOPHILS ABSOLUTE: 0.2 K/UL (ref 0–0.6)
EOSINOPHILS RELATIVE PERCENT: 2.5 % (ref 0–5)
GFR NON-AFRICAN AMERICAN: >60
GLUCOSE BLD-MCNC: 183 MG/DL (ref 70–99)
GLUCOSE BLD-MCNC: 197 MG/DL (ref 74–109)
GLUCOSE BLD-MCNC: 205 MG/DL (ref 70–99)
HCT VFR BLD CALC: 35.6 % (ref 42–52)
HEMOGLOBIN: 12 G/DL (ref 14–18)
LYMPHOCYTES ABSOLUTE: 3.1 K/UL (ref 1.1–4.5)
LYMPHOCYTES RELATIVE PERCENT: 42.2 % (ref 20–40)
MCH RBC QN AUTO: 31.6 PG (ref 27–31)
MCHC RBC AUTO-ENTMCNC: 33.7 G/DL (ref 33–37)
MCV RBC AUTO: 93.7 FL (ref 80–94)
MONOCYTES ABSOLUTE: 0.5 K/UL (ref 0–0.9)
MONOCYTES RELATIVE PERCENT: 7.4 % (ref 0–10)
NEUTROPHILS ABSOLUTE: 3.4 K/UL (ref 1.5–7.5)
NEUTROPHILS RELATIVE PERCENT: 46.9 % (ref 50–65)
PDW BLD-RTO: 12.5 % (ref 11.5–14.5)
PERFORMED ON: ABNORMAL
PERFORMED ON: ABNORMAL
PLATELET # BLD: 238 K/UL (ref 130–400)
PMV BLD AUTO: 9.5 FL (ref 9.4–12.4)
POTASSIUM SERPL-SCNC: 3.8 MMOL/L (ref 3.5–5)
RBC # BLD: 3.8 M/UL (ref 4.7–6.1)
SODIUM BLD-SCNC: 142 MMOL/L (ref 136–145)
WBC # BLD: 7.3 K/UL (ref 4.8–10.8)

## 2019-07-15 PROCEDURE — 6360000002 HC RX W HCPCS: Performed by: SURGERY

## 2019-07-15 PROCEDURE — 82948 REAGENT STRIP/BLOOD GLUCOSE: CPT

## 2019-07-15 PROCEDURE — 6370000000 HC RX 637 (ALT 250 FOR IP): Performed by: SURGERY

## 2019-07-15 PROCEDURE — 80048 BASIC METABOLIC PNL TOTAL CA: CPT

## 2019-07-15 PROCEDURE — 99024 POSTOP FOLLOW-UP VISIT: CPT | Performed by: SURGERY

## 2019-07-15 PROCEDURE — 99239 HOSP IP/OBS DSCHRG MGMT >30: CPT | Performed by: HOSPITALIST

## 2019-07-15 PROCEDURE — 36415 COLL VENOUS BLD VENIPUNCTURE: CPT

## 2019-07-15 PROCEDURE — 85025 COMPLETE CBC W/AUTO DIFF WBC: CPT

## 2019-07-15 PROCEDURE — 6370000000 HC RX 637 (ALT 250 FOR IP): Performed by: INTERNAL MEDICINE

## 2019-07-15 RX ORDER — ASPIRIN 81 MG/1
81 TABLET ORAL DAILY
Qty: 30 TABLET | Refills: 0 | Status: SHIPPED | OUTPATIENT
Start: 2019-07-16

## 2019-07-15 RX ORDER — SODIUM HYPOCHLORITE 1.25 MG/ML
SOLUTION TOPICAL 2 TIMES DAILY
Qty: 1 BOTTLE | Refills: 0 | Status: ON HOLD | OUTPATIENT
Start: 2019-07-15 | End: 2019-09-04 | Stop reason: HOSPADM

## 2019-07-15 RX ORDER — CEPHALEXIN 500 MG/1
1000 CAPSULE ORAL EVERY 12 HOURS SCHEDULED
Qty: 40 CAPSULE | Refills: 0 | Status: SHIPPED | OUTPATIENT
Start: 2019-07-15 | End: 2019-07-22 | Stop reason: SDUPTHER

## 2019-07-15 RX ORDER — HYDROCODONE BITARTRATE AND ACETAMINOPHEN 5; 325 MG/1; MG/1
1 TABLET ORAL EVERY 4 HOURS PRN
Qty: 15 TABLET | Refills: 0 | Status: SHIPPED | OUTPATIENT
Start: 2019-07-15 | End: 2019-07-20

## 2019-07-15 RX ADMIN — DAKIN'S SOLUTION 0.125% (QUARTER STRENGTH): 0.12 SOLUTION at 09:10

## 2019-07-15 RX ADMIN — INSULIN LISPRO 4 UNITS: 100 INJECTION, SOLUTION INTRAVENOUS; SUBCUTANEOUS at 09:02

## 2019-07-15 RX ADMIN — INSULIN LISPRO 2 UNITS: 100 INJECTION, SOLUTION INTRAVENOUS; SUBCUTANEOUS at 13:32

## 2019-07-15 RX ADMIN — COLLAGENASE SANTYL: 250 OINTMENT TOPICAL at 09:10

## 2019-07-15 RX ADMIN — MUPIROCIN: 20 OINTMENT TOPICAL at 09:10

## 2019-07-15 RX ADMIN — ASPIRIN 81 MG: 81 TABLET, COATED ORAL at 09:08

## 2019-07-15 RX ADMIN — INSULIN LISPRO 10 UNITS: 100 INJECTION, SOLUTION INTRAVENOUS; SUBCUTANEOUS at 09:02

## 2019-07-15 RX ADMIN — CEPHALEXIN 1000 MG: 500 CAPSULE ORAL at 09:02

## 2019-07-15 RX ADMIN — ENOXAPARIN SODIUM 40 MG: 40 INJECTION SUBCUTANEOUS at 09:08

## 2019-07-15 RX ADMIN — INSULIN LISPRO 10 UNITS: 100 INJECTION, SOLUTION INTRAVENOUS; SUBCUTANEOUS at 13:32

## 2019-07-15 RX ADMIN — LOSARTAN POTASSIUM 50 MG: 50 TABLET ORAL at 09:08

## 2019-07-15 NOTE — PROGRESS NOTES
Vascular Surgery Rounding Note                                                     Dr.Timothy Soliman    7/15/2019  8:07 AM     Antibiotic -Keflex 1,000mg po q12h    Post op -    PROCEDURE PERFORMED:  1.  Nonexcisional debridement of right heel wounds, medial wound 2.6 cm  x 1.6 cm, lateral plantar wound 3 cm x 1.0 cm, total debrided area of  right foot 4.56 sq. cm.  2.  Excisional debridement of skin and subcutaneous tissue, left heel  wound, 5 cm x 2.5 cm (7.5 cm2). 3.  Incision, drainage and debridement of left forefoot wound of skin,  subcutaneous tissue and tendon (muscle) 9 cm x 5 cm, approximately 1.5  cm in depth (45 cm2).      PROCEDURE PERFORMED:  1.  Amputation of the left fifth toe at the transmetatarsal level. 2.  Nonexcisional debridement of right medial heel wound, 2 cm x 1 cm;  nonexcisional debridement of the right heel wound, 3 cm x 1 cm; and  nonexcisional debridement of the left heel wound 4 cm x 2 cm; total  nonexcisional debridement area 13 sq. cm. Subjective- I hope I get to go home today,  I'm ready to go    Objective-   Invalid input(s): 24H    Intake/Output Summary (Last 24 hours) at 7/15/2019 0807  Last data filed at 7/15/2019 0650  Gross per 24 hour   Intake 450 ml   Output 2975 ml   Net -2525 ml     No intake/output data recorded. Physical Exam:  Awake, alert, appropriate Yes  BP (!) 147/87   Pulse 69   Temp 96.7 °F (35.9 °C)   Resp 20   Ht 6' 3\" (1.905 m)   Wt (!) 382 lb (173.3 kg)   SpO2 96%   BMI 47.75 kg/m²   Heart-Regular rate and rhythm  Lung-Clear bilaterally anterior  Abdomen-Abdomen soft, non-tender. BS normal.   Extremities-Feet with dressings intact, toes warm to touch  Neurologic- alert, oriented, normal speech  Wound surgical incisions-VAC dressing intact with good seal noted to left foot, appx 100ml of serosang drainage noted in canister.  Right foot with Felt and Foam intact, gauze wrapped around Felt and

## 2019-07-16 ENCOUNTER — TELEPHONE (OUTPATIENT)
Dept: INTERNAL MEDICINE | Age: 39
End: 2019-07-16

## 2019-07-16 DIAGNOSIS — E11.621 TYPE 2 DIABETES MELLITUS WITH FOOT ULCER, WITH LONG-TERM CURRENT USE OF INSULIN (HCC): Primary | Chronic | ICD-10-CM

## 2019-07-16 DIAGNOSIS — Z79.4 TYPE 2 DIABETES MELLITUS WITH FOOT ULCER, WITH LONG-TERM CURRENT USE OF INSULIN (HCC): Primary | Chronic | ICD-10-CM

## 2019-07-16 DIAGNOSIS — L97.509 TYPE 2 DIABETES MELLITUS WITH FOOT ULCER, WITH LONG-TERM CURRENT USE OF INSULIN (HCC): Primary | Chronic | ICD-10-CM

## 2019-07-16 LAB
BLOOD CULTURE, ROUTINE: NORMAL
CULTURE, BLOOD 2: NORMAL

## 2019-07-16 NOTE — TELEPHONE ENCOUNTER
Patient states he needs refill of his One Touch Ultra Two Test Strips sent to [facility]. Please review for refill. Thank you.

## 2019-07-18 ENCOUNTER — TELEPHONE (OUTPATIENT)
Dept: PRIMARY CARE CLINIC | Age: 39
End: 2019-07-18

## 2019-07-18 NOTE — TELEPHONE ENCOUNTER
He can try increasing his NovoLog to 12 units per meal I would imagine this is from the trauma from recent surgery.

## 2019-07-18 NOTE — TELEPHONE ENCOUNTER
Patient called with concerns related to blood sugars have started to go up since getting out of the hospital from toe amputation  Patients blood sugar this am was 288 -fasting  Blood sugars running 230 prior to meals and around  200 after meals  Patient has follow up appointment on Monday for hospital visit

## 2019-07-22 ENCOUNTER — OFFICE VISIT (OUTPATIENT)
Dept: PRIMARY CARE CLINIC | Age: 39
End: 2019-07-22
Payer: COMMERCIAL

## 2019-07-22 VITALS
TEMPERATURE: 98.8 F | HEIGHT: 75 IN | WEIGHT: 315 LBS | BODY MASS INDEX: 39.17 KG/M2 | SYSTOLIC BLOOD PRESSURE: 142 MMHG | HEART RATE: 101 BPM | DIASTOLIC BLOOD PRESSURE: 102 MMHG | OXYGEN SATURATION: 98 %

## 2019-07-22 DIAGNOSIS — Z79.4 TYPE 2 DIABETES MELLITUS WITH FOOT ULCER, WITH LONG-TERM CURRENT USE OF INSULIN (HCC): ICD-10-CM

## 2019-07-22 DIAGNOSIS — L08.9 DIABETIC FOOT INFECTION (HCC): ICD-10-CM

## 2019-07-22 DIAGNOSIS — E11.628 DIABETIC FOOT INFECTION (HCC): ICD-10-CM

## 2019-07-22 DIAGNOSIS — E11.621 TYPE 2 DIABETES MELLITUS WITH FOOT ULCER, WITH LONG-TERM CURRENT USE OF INSULIN (HCC): ICD-10-CM

## 2019-07-22 DIAGNOSIS — Z09 HOSPITAL DISCHARGE FOLLOW-UP: Primary | ICD-10-CM

## 2019-07-22 DIAGNOSIS — M86.9 OSTEOMYELITIS OF TOE OF LEFT FOOT (HCC): ICD-10-CM

## 2019-07-22 DIAGNOSIS — L97.509 TYPE 2 DIABETES MELLITUS WITH FOOT ULCER, WITH LONG-TERM CURRENT USE OF INSULIN (HCC): ICD-10-CM

## 2019-07-22 DIAGNOSIS — I10 ESSENTIAL HYPERTENSION: ICD-10-CM

## 2019-07-22 DIAGNOSIS — E66.01 CLASS 3 SEVERE OBESITY DUE TO EXCESS CALORIES WITH SERIOUS COMORBIDITY AND BODY MASS INDEX (BMI) OF 45.0 TO 49.9 IN ADULT (HCC): ICD-10-CM

## 2019-07-22 DIAGNOSIS — F41.9 ANXIETY: ICD-10-CM

## 2019-07-22 PROCEDURE — 1111F DSCHRG MED/CURRENT MED MERGE: CPT | Performed by: NURSE PRACTITIONER

## 2019-07-22 PROCEDURE — 99496 TRANSJ CARE MGMT HIGH F2F 7D: CPT | Performed by: NURSE PRACTITIONER

## 2019-07-22 RX ORDER — CEPHALEXIN 500 MG/1
1000 CAPSULE ORAL EVERY 12 HOURS SCHEDULED
Qty: 8 CAPSULE | Refills: 0 | Status: SHIPPED | OUTPATIENT
Start: 2019-07-22 | End: 2019-07-24

## 2019-07-22 RX ORDER — LOSARTAN POTASSIUM 50 MG/1
50 TABLET ORAL 2 TIMES DAILY
Qty: 60 TABLET | Refills: 5 | Status: SHIPPED | OUTPATIENT
Start: 2019-07-22

## 2019-07-22 RX ORDER — PREGABALIN 50 MG/1
50 CAPSULE ORAL 2 TIMES DAILY
COMMUNITY

## 2019-07-22 RX ORDER — BUSPIRONE HYDROCHLORIDE 5 MG/1
5 TABLET ORAL 3 TIMES DAILY
Qty: 90 TABLET | Refills: 0 | Status: SHIPPED | OUTPATIENT
Start: 2019-07-22 | End: 2019-08-20 | Stop reason: SDUPTHER

## 2019-07-22 ASSESSMENT — ENCOUNTER SYMPTOMS
SHORTNESS OF BREATH: 0
WHEEZING: 0
COUGH: 0
COLOR CHANGE: 0

## 2019-07-22 NOTE — PATIENT INSTRUCTIONS
Sliding Scale with meals:  Glucose:  150-199- 4 units  200-249- 6 units  250-299- 8 units  300-349- 10 units  350-400- 12 units    Continue Basaglar twice daily- 30 units.

## 2019-07-24 ENCOUNTER — HOSPITAL ENCOUNTER (OUTPATIENT)
Dept: WOUND CARE | Age: 39
Discharge: HOME | End: 2019-07-24
Payer: COMMERCIAL

## 2019-07-24 VITALS
SYSTOLIC BLOOD PRESSURE: 134 MMHG | HEIGHT: 75 IN | TEMPERATURE: 96.9 F | RESPIRATION RATE: 18 BRPM | WEIGHT: 315 LBS | DIASTOLIC BLOOD PRESSURE: 84 MMHG | HEART RATE: 79 BPM | BODY MASS INDEX: 39.17 KG/M2

## 2019-07-24 DIAGNOSIS — L97.412 ULCER OF RIGHT HEEL, WITH FAT LAYER EXPOSED (HCC): ICD-10-CM

## 2019-07-24 DIAGNOSIS — L97.422 MIDFOOT ULCERATION, LEFT, WITH FAT LAYER EXPOSED (HCC): ICD-10-CM

## 2019-07-24 PROCEDURE — 97597 DBRDMT OPN WND 1ST 20 CM/<: CPT

## 2019-07-24 PROCEDURE — 97598 DBRDMT OPN WND ADDL 20CM/<: CPT

## 2019-07-24 PROCEDURE — 97597 DBRDMT OPN WND 1ST 20 CM/<: CPT | Performed by: SURGERY

## 2019-07-24 ASSESSMENT — PAIN SCALES - GENERAL: PAINLEVEL_OUTOF10: 0

## 2019-07-24 NOTE — PROGRESS NOTES
rash, pallor, foot  wound. Neurologic - no dizziness, facial asymmetry, or light headedness. No seizures. No speech difficulty or lateralizing weakness. paraesthesia of feet  Hematologic - no easy bruising or excessive bleeding. Psychiatric - no severe anxiety or nervousness. No confusion. All other review of systems are negative. Physical Exam    /84   Pulse 79   Temp 96.9 °F (36.1 °C) (Temporal)   Resp 18   Ht 6' 3\" (1.905 m)   Wt (!) 350 lb (158.8 kg)   BMI 43.75 kg/m²     Constitutional - well developed, well nourished. No diaphoresis or acute distress. HENT - head normocephalic. Septum appears midline. Eyes - conjunctiva normal.  EOMS normal.  No exudate. No icterus. Neck- ROM appears normal, no tracheal deviation. Cardiovascular - Regular rate and rhythm. Heart sounds are normal.  No murmur, rub, or gallop. Carotid pulses are 2+ to palpation bilaterally without bruit. Extremities - Radial and brachial pulses are 2+ to palpation bilaterally. Right femoral pulse: present 2+; Right popliteal pulse: barely palpable Right DP: present 2+; Right PT present 2+; Left femoral pulse: present 2+; Left popliteal pulse: barely palpable; Left DP: present 2+; Left PT: present 2+  No cyanosis, clubbing, or significant edema. No signs atheroembolic event. Pulmonary - effort appears normal.  No respiratory distress. Lungs - Breath sounds normal. No wheezes or rales. GI - Abdomen - soft, non tender, bowel sounds X 4 quadrants. No guarding or rebound tenderness. No distension or palpable mass. Genitourinary - deferred. Musculoskeletal - ROM appears normal.  No significant edema. Neurologic - alert and oriented X 3. Physiologic. Psychiatric - mood, affect, and behavior appear normal.  Judgment and thought processes appear normal.  Skin - wounds of foot. Post Debridement Measurements and Assessment:  Negative Pressure Wound Therapy Toe (Comment  which one) Anterior; Left (Active) Diabetic May 1 7/24/2019  8:11 AM   Dressing Status Intact; Old drainage 7/24/2019  8:11 AM   Wound Cleansed Rinsed/Irrigated with saline 7/24/2019  8:11 AM   Wound Length (cm) 6 cm 7/24/2019  8:11 AM   Wound Width (cm) 8 cm 7/24/2019  8:11 AM   Wound Depth (cm) 1.5 cm 7/24/2019  8:11 AM   Wound Surface Area (cm^2) 48 cm^2 7/24/2019  8:11 AM   Wound Volume (cm^3) 72 cm^3 7/24/2019  8:11 AM   Post-Procedure Length (cm) 6 cm 7/24/2019  9:31 AM   Post-Procedure Width (cm) 8 cm 7/24/2019  9:31 AM   Post-Procedure Depth (cm) 1.5 cm 7/24/2019  9:31 AM   Post-Procedure Surface Area (cm^2) 48 cm^2 7/24/2019  9:31 AM   Post-Procedure Volume (cm^3) 72 cm^3 7/24/2019  9:31 AM   Wound Assessment Pink;Slough; Yellow 7/24/2019  8:11 AM   Drainage Amount Moderate 7/24/2019  8:11 AM   Drainage Description Serosanguinous 7/24/2019  8:11 AM   Odor None 7/24/2019  8:11 AM   Margins Attached edges 7/24/2019  8:11 AM   Exposed structure Bone 7/24/2019  8:11 AM   Alondra-wound Assessment Dry; Intact;Calloused 7/24/2019  8:11 AM   Non-staged Wound Description Not applicable 2/78/1455  5:68 AM   [place]%Wound Bed 90 7/24/2019  8:11 AM   Yellow%Wound Bed 10 7/24/2019  8:11 AM   Number of days: 0       Wound 07/24/19 Foot Right WOUND 6 LEFT FOOT HEEL DIABETIC WAG 1 (Active)   Wound Image    7/24/2019  8:11 AM   Wound Diabetic May 1 7/24/2019  8:11 AM   Dressing Status Old drainage; Intact 7/24/2019  8:11 AM   Wound Cleansed Rinsed/Irrigated with saline 7/24/2019  8:11 AM   Wound Length (cm) 3.2 cm 7/24/2019  8:11 AM   Wound Width (cm) 1.6 cm 7/24/2019  8:11 AM   Wound Depth (cm) 0.4 cm 7/24/2019  8:11 AM   Wound Surface Area (cm^2) 5.12 cm^2 7/24/2019  8:11 AM   Wound Volume (cm^3) 2.05 cm^3 7/24/2019  8:11 AM   Post-Procedure Length (cm) 3.2 cm 7/24/2019  9:31 AM   Post-Procedure Width (cm) 1.6 cm 7/24/2019  9:31 AM   Post-Procedure Depth (cm) 0.4 cm 7/24/2019  9:31 AM   Post-Procedure Surface Area (cm^2) 5.12 cm^2 7/24/2019  9:31 AM

## 2019-07-24 NOTE — DISCHARGE INSTR - COC
Continuity of Care Form    Patient Name: Yuliana Medina   :  1980  MRN:  [id number]    Admit date:  2019  Discharge date:  ***    Code Status Order: Prior   Advance Directives:   [facility] Documentation     Date/Time Healthcare Directive Type of Healthcare Directive Copy in [address] Agent's Name Healthcare Agent's Phone Number    [phone]  No, patient does not have an advance directive for healthcare treatment -- -- -- -- --          Admitting Physician:  No admitting provider for patient encounter. PCP: ELENA Kumar    Discharging Nurse: [facility] Unit/Room#: No information available for this encounter. Discharging Unit Phone Number: ***    Emergency Contact:   Extended Emergency Contact Information  Primary Emergency Contact: Juliann Diane  Address: [address] Phone: [phone]  Relation: Spouse  Secondary Emergency Contact: anastasia diane  [employer] Phone: [phone]  Relation: Parent   needed?  No    Past Surgical History:  Past Surgical History:   Procedure Laterality Date    COLONOSCOPY      ENDOSCOPY, COLON, DIAGNOSTIC      INCISION AND DRAINAGE Bilateral 2019    NON-EXCISIONAL DEBRIDEMENT OF RIGHT MEDIAL HEEL WOUND 2.6CM X 1.6CM; NON-EXCISIONAL DEBRIDEMENT OF RIGHT LATERAL HEEL WOUND 3CM X 1CM; EXCISIONAL DEBRIDEMENT OF SKIN AND SUBCUTANEOUS TISSUE LEFT HEEL WOUND 5CM X 2.5CM; EXCISIONAL DEBRIDEMENT OF SKIN, SUBCUTANEOUS TISSUE, AND TENDON LEFT DISTAL FOOT 9CM X 5CM performed by Lisseth Pedraza MD at [place] [address] Bilateral 2019    NON-EXCISIONAL DEBRIDEMENT OF RIGHT MEDIAL HEEL WOUND 2 X 1CM; NON-EXCISIONAL DEBRIDEMENT OF RIGHT HEEL WOUND 3 X 1CM; NON-EXCISIONAL DEBRIDEMENT OF LEFT HEEL WOUND 4 X 2CM performed by Lisseth Pedraza MD at [facility] OR    ID EGD TRANSORAL BIOPSY SINGLE/MULTIPLE N/A 10/5/2017

## 2019-07-24 NOTE — PLAN OF CARE
Negative Pressure    NAME:  Radha Snyder OF BIRTH:  1980  MEDICAL RECORD NUMBER:  [id number]  DATE:  7/24/2019     Applied Negative Pressure to Left foot wound(s)/ulcer(s).  [x] Applied skin barrier prep to jessica-wound.  [x] Cut strips of plastic drape to picture frame wound so that jessica-wound is     covered with the drape.  [x] If bridging dressing to less prominent site, cover any intact skin that will come in contact with the Negative Pressure Therapy sponge, gauze or channel drain with plastic drape. The sponge should never touch intact skin.  [x] Cut sponge, gauze or channel drain to size which will fit into the wound/ulcer bed without being forced.  [x] Be sure the sponge is large enough to hold the entire round plastic flange which is attached to the tubing. Never allow flange to be larger than the sponge or it will produce suction damaging intact skin.  Total number of individual pieces of foam used within the wound bed: 1     [x] If bridging the dressing away from the primary site, be sure the bridge leads to a piece of sponge large enough to hold the entire flange without allowing any of the flange to overlap onto intact skin.  [x] Covered sponge, gauze or channel drain with plastic drape.  [x] Cut a hole in this plastic drape directly over the sponge the same size as the plastic drain tubing.  [x] Removed plastic liner from flange and apply it directly over the hole you cut.  [x] Removed the plastic cover from the flange.  [x] Attached the tubing to the wound/ulcer Negative Pressure Therapy and turn it on to be sure a vacuum is created and that there are no leaks.  [x] If air leaks occur, use plastic drape to patch them.  [x] Secured Negative Pressure Therapy dressing with ace wrap loosely if located on an extremity. Maintain tubing outside of ace wrap. Tubing must not exert pressure on intact skin.     Applied per  Guidelines      Electronically

## 2019-07-31 ENCOUNTER — HOSPITAL ENCOUNTER (OUTPATIENT)
Dept: WOUND CARE | Age: 39
Discharge: HOME | End: 2019-07-31
Payer: COMMERCIAL

## 2019-07-31 VITALS
WEIGHT: 315 LBS | DIASTOLIC BLOOD PRESSURE: 87 MMHG | SYSTOLIC BLOOD PRESSURE: 131 MMHG | HEIGHT: 75 IN | BODY MASS INDEX: 39.17 KG/M2 | RESPIRATION RATE: 14 BRPM | TEMPERATURE: 99.3 F | HEART RATE: 79 BPM

## 2019-07-31 DIAGNOSIS — L97.422 MIDFOOT ULCERATION, LEFT, WITH FAT LAYER EXPOSED (HCC): ICD-10-CM

## 2019-07-31 DIAGNOSIS — L97.412 ULCER OF RIGHT HEEL, WITH FAT LAYER EXPOSED (HCC): ICD-10-CM

## 2019-07-31 PROCEDURE — 97597 DBRDMT OPN WND 1ST 20 CM/<: CPT | Performed by: SURGERY

## 2019-07-31 PROCEDURE — 97598 DBRDMT OPN WND ADDL 20CM/<: CPT

## 2019-07-31 PROCEDURE — 97597 DBRDMT OPN WND 1ST 20 CM/<: CPT

## 2019-07-31 ASSESSMENT — PAIN DESCRIPTION - LOCATION: LOCATION: FOOT

## 2019-07-31 ASSESSMENT — PAIN DESCRIPTION - ORIENTATION: ORIENTATION: LEFT

## 2019-07-31 ASSESSMENT — PAIN SCALES - GENERAL: PAINLEVEL_OUTOF10: 2

## 2019-07-31 ASSESSMENT — PAIN DESCRIPTION - PAIN TYPE: TYPE: ACUTE PAIN

## 2019-07-31 NOTE — PROGRESS NOTES
[facility]   Progress Note and Procedure Note      Tim Gonzales  AGE: 46 y.o. GENDER: male  : 1980  TODAY'S DATE:  2019    Subjective:   CC- Left forefoot and heel wound  Right foot wounds heel x 2   HISTORY of PRESENT ILLNESS HPI   Tim Gonzales is a 46 y.o. male who presents today for wound evaluation. Wound Type:diabetic, pressure and neuropathic  Wound Location:Right heel plantar, right heal medial (healed since being out of hospital) Left heel plantar, Left plantar,lateral forefoot, Also has developed wound on right anterior abdomen since being d/c's from hospital.  Modifying factors:lymphedema, diabetes, chronic pressure, shear force and obesity      - POD-20  Procedure(s):  1) AMPUTATION LEFT FIFITH TOE at the TM level  2)NON-EXCISIONAL DEBRIDEMENT OF RIGHT MEDIAL HEEL WOUND 2 X 1CM; NON-EXCISIONAL DEBRIDEMENT OF RIGHT HEEL WOUND 3 X 1CM; NON-EXCISIONAL DEBRIDEMENT OF LEFT HEEL WOUND 4 X 2CM ( area debrided 13 sq cm)     NOTE: Patient was initially scheduled for next week however, called that he had \"new Blisters\" on left plantar foot.     Patient Active Problem List   Diagnosis Code    Esophageal dysphagia R13.10    Indigestion K30    Belching R14.2    Mixed hyperlipidemia E78.2    Diabetic ulcer of toe of left foot associated with type 2 diabetes mellitus, with fat layer exposed ([address] E11.621, L97.522    Non-pressure ulcer of toe ([address] L97.509    Cellulitis of left toe L03.032    Type 2 diabetes mellitus with foot ulcer, with long-term current use of insulin (HCC) E11.621, L97.509, Z79.4    Class 3 severe obesity due to excess calories with serious comorbidity and body mass index (BMI) of 45.0 to 49.9 in adult ([address] E66.01, Z68.42    Osteomyelitis of toe of left foot ([address] M86.9    Midfoot ulceration, left, with fat layer exposed ([address] L97.422    Ulcer of right heel, with fat layer exposed ([address] L97.412    Diabetic foot infection (HCC) E11.628, L08.9    Essential hypertension I10    Abdominal wall cellulitis L03.311    Abscess of left foot L02.612       ALLERGIES    Allergies   Allergen Reactions    Codeine     Joseuvia [Sitagliptin]     Niacin And Related             Objective:      /87   Pulse 79   Temp 99.3 °F (37.4 °C) (Temporal)   Resp 14   Ht 6' 3\" (1.905 m)   Wt (!) 350 lb (158.8 kg)   BMI 43.75 kg/m²     Post Debridement Measurements and Assessment:  Negative Pressure Wound Therapy Toe (Comment  which one) Anterior; Left (Active)   Wound Type Diabetic foot ulcer 7/31/2019 12:02 PM   Unit Type KCI 7/31/2019 12:02 PM   Dressing Type Black foam 7/31/2019 12:02 PM   Number of pieces used 1 7/31/2019 12:02 PM   Cycle Continuous 7/31/2019 12:02 PM   Target Pressure (mmHg) 125 7/31/2019 12:02 PM   Canister changed? Yes 7/31/2019 12:02 PM   Dressing Status New drainage; Intact 7/31/2019 12:02 PM   Dressing Changed Changed/New 7/24/2019 10:51 AM   Drainage Amount Moderate 7/31/2019 12:02 PM   Drainage Description Serosanguinous 7/31/2019 12:02 PM   Output (ml) 0 ml 7/31/2019 12:02 PM   Wound Assessment Granulation tissue;Pink;Red 7/31/2019 12:02 PM   Alondra-wound Assessment Dry; Intact 7/31/2019 12:02 PM   Shape irregular 7/24/2019 10:51 AM   Odor None 7/31/2019 12:02 PM   Number of days: 18       Wound 06/24/19 Heel Right;Plantar #3 right heel diabetic foot wound (Active)   Wound Image   7/31/2019 12:02 PM   Wound Diabetic May 1 7/31/2019 12:02 PM   Dressing Status Old drainage; Intact 7/31/2019 12:02 PM   Dressing Changed Changed/New 7/24/2019 10:51 AM   Dressing/Treatment Moist to dry; Santyl; Pharmaceutical agent (see MAR) 7/14/2019 10:51 PM   Wound Cleansed Rinsed/Irrigated with saline 7/31/2019 12:02 PM   Wound Length (cm) 2 cm 7/31/2019 12:02 PM   Wound Width (cm) 0.3 cm 7/31/2019 12:02 PM   Wound Depth (cm) 0.2 cm 7/31/2019 12:02 PM   Wound Surface Area (cm^2) 0.6 cm^2 7/31/2019 12:02 PM   Change in Wound Size % (l*w) 75 7/31/2019 12:02 PM   Wound Volume Assessment Pink;Slough; Yellow 7/31/2019 12:02 PM   Drainage Amount Moderate 7/31/2019 12:02 PM   Drainage Description Serosanguinous 7/31/2019 12:02 PM   Odor None 7/31/2019 12:02 PM   Margins Attached edges 7/31/2019 12:02 PM   Exposed structure Bone 7/31/2019 12:02 PM   Alondra-wound Assessment Dry; Intact;Calloused 7/31/2019 12:02 PM   Non-staged Wound Description Not applicable 2/67/9429 65:63 PM   [place]%Wound Bed 90 7/31/2019 12:02 PM   Yellow%Wound Bed 10 7/31/2019 12:02 PM   Number of days: 7       Wound 07/24/19 Foot Right WOUND 6 LEFT FOOT HEEL DIABETIC WAG 1 (Active)   Wound Image   7/31/2019 12:02 PM   Wound Diabetic May 1 7/31/2019 12:02 PM   Dressing Status Old drainage; Intact 7/31/2019 12:02 PM   Dressing Changed Changed/New 7/24/2019 10:51 AM   Wound Cleansed Rinsed/Irrigated with saline 7/31/2019 12:02 PM   Wound Length (cm) 3 cm 7/31/2019 12:02 PM   Wound Width (cm) 1.4 cm 7/31/2019 12:02 PM   Wound Depth (cm) 0.2 cm 7/31/2019 12:02 PM   Wound Surface Area (cm^2) 4.2 cm^2 7/31/2019 12:02 PM   Change in Wound Size % (l*w) 17.97 7/31/2019 12:02 PM   Wound Volume (cm^3) 0.84 cm^3 7/31/2019 12:02 PM   Wound Healing % 59 7/31/2019 12:02 PM   Post-Procedure Length (cm) 3.2 cm 7/24/2019  9:31 AM   Post-Procedure Width (cm) 1.6 cm 7/24/2019  9:31 AM   Post-Procedure Depth (cm) 0.4 cm 7/24/2019  9:31 AM   Post-Procedure Surface Area (cm^2) 5.12 cm^2 7/24/2019  9:31 AM   Post-Procedure Volume (cm^3) 2.05 cm^3 7/24/2019  9:31 AM   Distance Tunneling (cm) 0 cm 7/31/2019 12:02 PM   Tunneling Position ___ O'Clock 0 7/31/2019 12:02 PM   Undermining Starts ___ O'Clock 0 7/31/2019 12:02 PM   Undermining Ends___ O'Clock 0 7/31/2019 12:02 PM   Undermining Maxium Distance (cm) 0 7/31/2019 12:02 PM   Wound Assessment Pink;Slough; Yellow 7/31/2019 12:02 PM   Drainage Amount Moderate 7/31/2019 12:02 PM   Drainage Description Serosanguinous 7/31/2019 12:02 PM   Odor None 7/31/2019 12:02 PM   Margins Attached edges 7/31/2019 12:02 PM   Alondra-wound Assessment Dry; Intact;Calloused 7/31/2019 12:02 PM   Non-staged Wound Description Not applicable 3/19/1559 35:00 PM   [place]%Wound Bed 90 7/31/2019 12:02 PM   Yellow%Wound Bed 10 7/31/2019 12:02 PM   Number of days: 7          The patients pain isPain Level: 2 Pain Type: Acute pain. Wound(s) all improved. Please refer to nursing measurements and assessment regarding wound pre and post debridement. Procedure Note:    Wound #: 5 left forefoot, (in PO period)    Debridement: Non-excisional Debridement    Anesthetic:   topical lidocaine    Using curette, #15 blade scalpel, scissors and forceps the wound was sharply debrided    down through and including the removal of epidermis and dermis. Total Surface Area Debrided:  36 sq cm   Devitalized Tissue Debrided:  fibrin, biofilm, slough and exudate    Percent of Wound Debrided: 100%      Bleeding: Minimal    Hemostasis:   not needed      Response to treatment:  Well tolerated by patient. Procedure Note:    Wound #: 3 and 6 (not in PO period)    Debridement: Non-excisional Debridement    Anesthetic:  Topical lidocaine    Using #15 blade scalpel the wound was sharply debrided    down through and including the removal of epidermis and dermis. Total Surface Area Debrided:  8 sq cm   Devitalized Tissue Debrided:  fibrin, biofilm and callus    Percent of Wound Debrided: 100%      Bleeding: Minimal    Hemostasis:   by pressure      Response to treatment:  Well tolerated by patient.     Assessment:      Patient Active Problem List   Diagnosis    Esophageal dysphagia    Indigestion    Belching    Mixed hyperlipidemia    Diabetic ulcer of toe of left foot associated with type 2 diabetes mellitus, with fat layer exposed ([address]    Non-pressure ulcer of toe (HCC)    Cellulitis of left toe    Type 2 diabetes mellitus with foot ulcer, with long-term current use of insulin ([address]    Class 3 severe obesity due to excess calories with serious

## 2019-08-01 NOTE — PLAN OF CARE
Type: Diabetic Ulcer  Location: Foot  Wound Location Orientation: Right  Wound Descr. .. Wound Image    Wound Diabetic May 1   Dressing Status Old drainage; Intact   Wound Cleansed Rinsed/Irrigated with saline   Wound Length (cm) 3 cm   Wound Width (cm) 1.4 cm   Wound Depth (cm) 0.2 cm   Wound Surface Area (cm^2) 4.2 cm^2   Change in Wound Size % (l*w) 17.97   Wound Volume (cm^3) 0.84 cm^3   Wound Healing % 59   Distance Tunneling (cm) 0 cm   Tunneling Position ___ O'Clock 0   Undermining Starts ___ O'Clock 0   Undermining Ends___ O'Clock 0   Undermining [place] Distance (cm) 0   Wound Assessment Pink;Slough; Yellow   Drainage Amount Moderate   Drainage Description Serosanguinous   Odor None   Margins Attached edges   Alondra-wound Assessment Dry; Intact;Calloused   Non-staged Wound Description Not applicable   Pink%Wound Bed 90   Yellow%Wound Bed 10

## 2019-08-07 ENCOUNTER — HOSPITAL ENCOUNTER (OUTPATIENT)
Dept: WOUND CARE | Age: 39
Discharge: HOME | End: 2019-08-07
Payer: COMMERCIAL

## 2019-08-07 VITALS
BODY MASS INDEX: 39.17 KG/M2 | WEIGHT: 315 LBS | SYSTOLIC BLOOD PRESSURE: 139 MMHG | RESPIRATION RATE: 16 BRPM | HEART RATE: 109 BPM | TEMPERATURE: 98.3 F | HEIGHT: 75 IN | DIASTOLIC BLOOD PRESSURE: 80 MMHG

## 2019-08-07 DIAGNOSIS — L97.422 MIDFOOT ULCERATION, LEFT, WITH FAT LAYER EXPOSED (HCC): ICD-10-CM

## 2019-08-07 DIAGNOSIS — L97.412 ULCER OF RIGHT HEEL, WITH FAT LAYER EXPOSED (HCC): ICD-10-CM

## 2019-08-07 PROCEDURE — 97597 DBRDMT OPN WND 1ST 20 CM/<: CPT | Performed by: SURGERY

## 2019-08-07 PROCEDURE — 97597 DBRDMT OPN WND 1ST 20 CM/<: CPT

## 2019-08-07 PROCEDURE — 97598 DBRDMT OPN WND ADDL 20CM/<: CPT | Performed by: SURGERY

## 2019-08-14 ENCOUNTER — HOSPITAL ENCOUNTER (OUTPATIENT)
Dept: WOUND CARE | Age: 39
Discharge: HOME | End: 2019-08-14
Payer: COMMERCIAL

## 2019-08-14 VITALS
DIASTOLIC BLOOD PRESSURE: 90 MMHG | RESPIRATION RATE: 14 BRPM | SYSTOLIC BLOOD PRESSURE: 146 MMHG | HEIGHT: 75 IN | BODY MASS INDEX: 39.17 KG/M2 | TEMPERATURE: 98.1 F | HEART RATE: 104 BPM | WEIGHT: 315 LBS

## 2019-08-14 DIAGNOSIS — L97.412 ULCER OF RIGHT HEEL, WITH FAT LAYER EXPOSED (HCC): ICD-10-CM

## 2019-08-14 DIAGNOSIS — L97.422 MIDFOOT ULCERATION, LEFT, WITH FAT LAYER EXPOSED (HCC): ICD-10-CM

## 2019-08-14 PROCEDURE — 97598 DBRDMT OPN WND ADDL 20CM/<: CPT | Performed by: SURGERY

## 2019-08-14 PROCEDURE — 11045 DBRDMT SUBQ TISS EACH ADDL: CPT

## 2019-08-14 PROCEDURE — 97597 DBRDMT OPN WND 1ST 20 CM/<: CPT

## 2019-08-14 PROCEDURE — 97597 DBRDMT OPN WND 1ST 20 CM/<: CPT | Performed by: SURGERY

## 2019-08-14 PROCEDURE — 11042 DBRDMT SUBQ TIS 1ST 20SQCM/<: CPT

## 2019-08-14 RX ORDER — SODIUM HYPOCHLORITE 1.25 MG/ML
SOLUTION TOPICAL
Qty: 1 BOTTLE | Refills: 3 | Status: ON HOLD | OUTPATIENT
Start: 2019-08-14 | End: 2019-09-04 | Stop reason: HOSPADM

## 2019-08-14 ASSESSMENT — PAIN DESCRIPTION - FREQUENCY: FREQUENCY: INTERMITTENT

## 2019-08-14 ASSESSMENT — PAIN DESCRIPTION - PROGRESSION: CLINICAL_PROGRESSION: NOT CHANGED

## 2019-08-14 ASSESSMENT — PAIN DESCRIPTION - ONSET: ONSET: ON-GOING

## 2019-08-14 ASSESSMENT — PAIN DESCRIPTION - PAIN TYPE: TYPE: ACUTE PAIN

## 2019-08-14 ASSESSMENT — PAIN DESCRIPTION - DESCRIPTORS: DESCRIPTORS: ACHING;BURNING

## 2019-08-14 ASSESSMENT — PAIN SCALES - GENERAL: PAINLEVEL_OUTOF10: 3

## 2019-08-14 ASSESSMENT — PAIN DESCRIPTION - LOCATION: LOCATION: FOOT

## 2019-08-14 ASSESSMENT — PAIN DESCRIPTION - ORIENTATION: ORIENTATION: LEFT;RIGHT

## 2019-08-14 NOTE — PROGRESS NOTES
8/7/2019  8:49 AM   Post-Procedure Depth (cm) 0.1 cm 8/7/2019  8:49 AM   Post-Procedure Surface Area (cm^2) 1.68 cm^2 8/7/2019  8:49 AM   Post-Procedure Volume (cm^3) 0.17 cm^3 8/7/2019  8:49 AM   Distance Tunneling (cm) 0 cm 8/14/2019 11:36 AM   Tunneling Position ___ O'Clock 0 8/14/2019 11:36 AM   Undermining Starts ___ O'Clock 0 8/14/2019 11:36 AM   Undermining Ends___ O'Clock 0 8/14/2019 11:36 AM   Undermining [place] Distance (cm) 0 8/14/2019 11:36 AM   Wound Assessment Pink;Slough; Yellow 8/14/2019 11:36 AM   Drainage Amount Moderate 8/14/2019 11:36 AM   Drainage Description Serosanguinous 8/14/2019 11:36 AM   Odor None 8/14/2019 11:36 AM   Margins Attached edges 8/14/2019 11:36 AM   Alondra-wound Assessment Maceration 8/14/2019 11:36 AM   Non-staged Wound Description Full thickness 8/14/2019 11:36 AM   [place]%Wound Bed 85 8/14/2019 11:36 AM   Red%Wound Bed 0 8/14/2019 11:36 AM   Yellow%Wound Bed 15 8/14/2019 11:36 AM   Black%Wound Bed 0 8/14/2019 11:36 AM   Purple%Wound Bed 0 8/14/2019 11:36 AM   Other%Wound Bed 0 8/14/2019 11:36 AM   Number of days: 51       Wound 07/09/19 Abdomen Lateral;Lower;Right WOUND 4 ABDOMEN ATYPICAL (Active)   Wound Image   8/14/2019 11:36 AM   Wound Other 8/14/2019 11:36 AM   Dressing Status Old drainage 8/14/2019 11:36 AM   Dressing Changed Changed/New 8/7/2019  9:47 AM   Dressing/Treatment Antibacterial Ointment;4x4;Medipore 8/7/2019  9:47 AM   Wound Cleansed Rinsed/Irrigated with saline 8/14/2019 11:36 AM   Wound Length (cm) 0.7 cm 8/14/2019 11:36 AM   Wound Width (cm) 1.2 cm 8/14/2019 11:36 AM   Wound Depth (cm) 0.1 cm 8/14/2019 11:36 AM   Wound Surface Area (cm^2) 0.84 cm^2 8/14/2019 11:36 AM   Change in Wound Size % (l*w) 86 8/14/2019 11:36 AM   Wound Volume (cm^3) 0.08 cm^3 8/14/2019 11:36 AM   Wound Healing % 99 8/14/2019 11:36 AM   Post-Procedure Length (cm) 2 cm 8/7/2019  8:49 AM   Post-Procedure Width (cm) 1.3 cm 8/7/2019  8:49 AM   Post-Procedure Depth (cm) 0.1 cm 8/7/2019 8/7/2019  8:49 AM   Post-Procedure Volume (cm^3) 37.5 cm^3 8/7/2019  8:49 AM   Distance Tunneling (cm) 0 cm 8/14/2019 11:36 AM   Tunneling Position ___ O'Clock 0 8/14/2019 11:36 AM   Undermining Starts ___ O'Clock 0 8/14/2019 11:36 AM   Undermining Ends___ O'Clock 0 8/14/2019 11:36 AM   Undermining [place] Distance (cm) 0 8/14/2019 11:36 AM   Wound Assessment Pink;Slough; Yellow 8/14/2019 11:36 AM   Drainage Amount Moderate 8/14/2019 11:36 AM   Drainage Description Serosanguinous 8/14/2019 11:36 AM   Odor None 8/14/2019 11:36 AM   Margins Attached edges 8/14/2019 11:36 AM   Exposed structure Bone 7/31/2019 12:02 PM   Alondra-wound Assessment Dry; Intact;Calloused 8/14/2019 11:36 AM   Non-staged Wound Description Not applicable 0/84/8864 67:41 AM   [place]%Wound Bed 90 8/14/2019 11:36 AM   Red%Wound Bed 0 8/14/2019 11:36 AM   Yellow%Wound Bed 10 8/14/2019 11:36 AM   Black%Wound Bed 0 8/14/2019 11:36 AM   Purple%Wound Bed 0 8/14/2019 11:36 AM   Other%Wound Bed 0 8/14/2019 11:36 AM   Number of days: 21       Wound 07/24/19 Foot Right WOUND 6 LEFT FOOT HEEL DIABETIC WAG 1 (Active)   Wound Image   8/14/2019 11:36 AM   Wound Diabetic May 1 8/14/2019 11:36 AM   Dressing Status Old drainage 8/14/2019 11:36 AM   Dressing Changed Changed/New 8/7/2019  9:47 AM   Dressing/Treatment Wound gel 7/31/2019  2:15 PM   Wound Cleansed Rinsed/Irrigated with saline 8/14/2019 11:36 AM   Wound Length (cm) 2.7 cm 8/14/2019 11:36 AM   Wound Width (cm) 1.5 cm 8/14/2019 11:36 AM   Wound Depth (cm) 0.3 cm 8/14/2019 11:36 AM   Wound Surface Area (cm^2) 4.05 cm^2 8/14/2019 11:36 AM   Change in Wound Size % (l*w) 20.9 8/14/2019 11:36 AM   Wound Volume (cm^3) 1.22 cm^3 8/14/2019 11:36 AM   Wound Healing % 40 8/14/2019 11:36 AM   Post-Procedure Length (cm) 3.5 cm 8/7/2019  8:49 AM   Post-Procedure Width (cm) 1.9 cm 8/7/2019  8:49 AM   Post-Procedure Depth (cm) 0.2 cm 8/7/2019  8:49 AM   Post-Procedure Surface Area (cm^2) 6.65 cm^2 8/7/2019  8:49 AM

## 2019-08-19 ENCOUNTER — TELEPHONE (OUTPATIENT)
Dept: WOUND CARE | Age: 39
End: 2019-08-19

## 2019-08-19 ENCOUNTER — HOSPITAL ENCOUNTER (OUTPATIENT)
Dept: WOUND CARE | Age: 39
Discharge: HOME | End: 2019-08-19
Payer: COMMERCIAL

## 2019-08-19 VITALS
RESPIRATION RATE: 16 BRPM | DIASTOLIC BLOOD PRESSURE: 82 MMHG | WEIGHT: 315 LBS | SYSTOLIC BLOOD PRESSURE: 144 MMHG | HEART RATE: 110 BPM | HEIGHT: 75 IN | TEMPERATURE: 97.7 F | BODY MASS INDEX: 39.17 KG/M2

## 2019-08-19 DIAGNOSIS — E11.628 DIABETIC FOOT INFECTION (HCC): ICD-10-CM

## 2019-08-19 DIAGNOSIS — L97.412 ULCER OF RIGHT HEEL, WITH FAT LAYER EXPOSED (HCC): ICD-10-CM

## 2019-08-19 DIAGNOSIS — L97.509 TYPE 2 DIABETES MELLITUS WITH FOOT ULCER, WITH LONG-TERM CURRENT USE OF INSULIN (HCC): Chronic | ICD-10-CM

## 2019-08-19 DIAGNOSIS — L97.422 MIDFOOT ULCERATION, LEFT, WITH FAT LAYER EXPOSED (HCC): ICD-10-CM

## 2019-08-19 DIAGNOSIS — Z79.4 TYPE 2 DIABETES MELLITUS WITH FOOT ULCER, WITH LONG-TERM CURRENT USE OF INSULIN (HCC): Chronic | ICD-10-CM

## 2019-08-19 DIAGNOSIS — L02.612 ABSCESS OF LEFT FOOT: Primary | ICD-10-CM

## 2019-08-19 DIAGNOSIS — L08.9 DIABETIC FOOT INFECTION (HCC): ICD-10-CM

## 2019-08-19 DIAGNOSIS — L97.422 ULCER OF LEFT HEEL, WITH FAT LAYER EXPOSED (HCC): Chronic | ICD-10-CM

## 2019-08-19 DIAGNOSIS — E11.621 TYPE 2 DIABETES MELLITUS WITH FOOT ULCER, WITH LONG-TERM CURRENT USE OF INSULIN (HCC): Chronic | ICD-10-CM

## 2019-08-19 PROCEDURE — 11042 DBRDMT SUBQ TIS 1ST 20SQCM/<: CPT | Performed by: SURGERY

## 2019-08-19 PROCEDURE — 97597 DBRDMT OPN WND 1ST 20 CM/<: CPT

## 2019-08-19 PROCEDURE — 11045 DBRDMT SUBQ TISS EACH ADDL: CPT

## 2019-08-19 PROCEDURE — 11042 DBRDMT SUBQ TIS 1ST 20SQCM/<: CPT

## 2019-08-19 PROCEDURE — 87205 SMEAR GRAM STAIN: CPT

## 2019-08-19 PROCEDURE — 87186 SC STD MICRODIL/AGAR DIL: CPT

## 2019-08-19 PROCEDURE — 87070 CULTURE OTHR SPECIMN AEROBIC: CPT

## 2019-08-19 PROCEDURE — 11045 DBRDMT SUBQ TISS EACH ADDL: CPT | Performed by: SURGERY

## 2019-08-19 PROCEDURE — 97597 DBRDMT OPN WND 1ST 20 CM/<: CPT | Performed by: SURGERY

## 2019-08-19 PROCEDURE — 87075 CULTR BACTERIA EXCEPT BLOOD: CPT

## 2019-08-19 RX ORDER — AMOXICILLIN AND CLAVULANATE POTASSIUM 875; 125 MG/1; MG/1
1 TABLET, FILM COATED ORAL 2 TIMES DAILY
Qty: 28 TABLET | Refills: 0 | Status: ON HOLD | OUTPATIENT
Start: 2019-08-19 | End: 2019-09-04 | Stop reason: HOSPADM

## 2019-08-19 ASSESSMENT — PAIN DESCRIPTION - FREQUENCY: FREQUENCY: INTERMITTENT

## 2019-08-19 ASSESSMENT — PAIN DESCRIPTION - LOCATION: LOCATION: FOOT

## 2019-08-19 ASSESSMENT — PAIN DESCRIPTION - ONSET: ONSET: ON-GOING

## 2019-08-19 ASSESSMENT — PAIN DESCRIPTION - PROGRESSION: CLINICAL_PROGRESSION: NOT CHANGED

## 2019-08-19 ASSESSMENT — PAIN DESCRIPTION - DESCRIPTORS: DESCRIPTORS: ACHING;BURNING

## 2019-08-19 ASSESSMENT — PAIN SCALES - GENERAL: PAINLEVEL_OUTOF10: 2

## 2019-08-19 ASSESSMENT — PAIN DESCRIPTION - PAIN TYPE: TYPE: ACUTE PAIN

## 2019-08-19 ASSESSMENT — PAIN DESCRIPTION - ORIENTATION: ORIENTATION: RIGHT;LEFT

## 2019-08-19 NOTE — TELEPHONE ENCOUNTER
Diana Tanner with [facility] called stating left foot is red and wound has slough. Patient scheduled for STSG to left foot on Thursday. Patient will come in to be assessed by Dr. Saul Even today.

## 2019-08-20 NOTE — PROGRESS NOTES
Wound Surface Area (cm^2) 0.45 cm^2 8/19/2019  4:46 PM   Change in Wound Size % (l*w) 81.25 8/19/2019  4:46 PM   Wound Volume (cm^3) 0.04 cm^3 8/19/2019  4:46 PM   Wound Healing % 92 8/19/2019  4:46 PM   Post-Procedure Length (cm) 1.5 cm 8/19/2019  5:22 PM   Post-Procedure Width (cm) 0.3 cm 8/19/2019  5:22 PM   Post-Procedure Depth (cm) 0.1 cm 8/19/2019  5:22 PM   Post-Procedure Surface Area (cm^2) 0.45 cm^2 8/19/2019  5:22 PM   Post-Procedure Volume (cm^3) 0.04 cm^3 8/19/2019  5:22 PM   Distance Tunneling (cm) 0 cm 8/14/2019 11:36 AM   Tunneling Position ___ O'Clock 0 8/14/2019 11:36 AM   Undermining Starts ___ O'Clock 0 8/14/2019 11:36 AM   Undermining Ends___ O'Clock 0 8/14/2019 11:36 AM   Undermining Maxium Distance (cm) 0 8/14/2019 11:36 AM   Wound Assessment Pink;Red;Slough; Yellow 8/19/2019  4:46 PM   Drainage Amount Moderate 8/19/2019  4:46 PM   Drainage Description Serosanguinous 8/19/2019  4:46 PM   Odor None 8/19/2019  4:46 PM   Margins Attached edges 8/19/2019  4:46 PM   Alondra-wound Assessment Maceration 8/19/2019  4:46 PM   Non-staged Wound Description Full thickness 8/19/2019  4:46 PM   [place]%Wound Bed 85 8/19/2019  4:46 PM   Red%Wound Bed 0 8/14/2019 11:36 AM   Yellow%Wound Bed 15 8/19/2019  4:46 PM   Black%Wound Bed 0 8/14/2019 11:36 AM   Purple%Wound Bed 0 8/14/2019 11:36 AM   Other%Wound Bed 0 8/14/2019 11:36 AM   Number of days: 56       Wound 07/09/19 Abdomen Lateral;Lower;Right WOUND 4 ABDOMEN ATYPICAL (Active)   Wound Image   8/19/2019  4:46 PM   Wound Other 8/19/2019  4:46 PM   Dressing Status Old drainage; Intact 8/19/2019  4:46 PM   Dressing Changed Changed/New 8/19/2019  6:02 PM   Dressing/Treatment Antibacterial Ointment;4x4;Medipore 8/19/2019  6:02 PM   Wound Cleansed Rinsed/Irrigated with saline 8/19/2019  4:46 PM   Wound Length (cm) 0.7 cm 8/19/2019  4:46 PM   Wound Width (cm) 0.6 cm 8/19/2019  4:46 PM   Wound Depth (cm) 0.1 cm 8/19/2019  4:46 PM   Wound Surface Area (cm^2) 0.42 cm^2 PM   Wound Volume (cm^3) 0.38 cm^3 8/19/2019  4:46 PM   Wound Healing % 81 8/19/2019  4:46 PM   Post-Procedure Length (cm) 2.5 cm 8/19/2019  5:22 PM   Post-Procedure Width (cm) 1.5 cm 8/19/2019  5:22 PM   Post-Procedure Depth (cm) 0.1 cm 8/19/2019  5:22 PM   Post-Procedure Surface Area (cm^2) 3.75 cm^2 8/19/2019  5:22 PM   Post-Procedure Volume (cm^3) 0.38 cm^3 8/19/2019  5:22 PM   Distance Tunneling (cm) 0 cm 8/14/2019 11:36 AM   Tunneling Position ___ O'Clock 0 8/14/2019 11:36 AM   Undermining Starts ___ O'Clock 0 8/14/2019 11:36 AM   Undermining Ends___ O'Clock 0 8/14/2019 11:36 AM   Undermining Maxium Distance (cm) 0 8/14/2019 11:36 AM   Wound Assessment Pink;Slough; Yellow 8/19/2019  4:46 PM   Drainage Amount Moderate 8/19/2019  4:46 PM   Drainage Description Serosanguinous 8/19/2019  4:46 PM   Odor None 8/19/2019  4:46 PM   Margins Attached edges 8/19/2019  4:46 PM   Alondra-wound Assessment Dry; Intact;Calloused 8/19/2019  4:46 PM   Non-staged Wound Description Not applicable 0/45/5026  8:83 PM   [place]%Wound Bed 90 8/19/2019  4:46 PM   Red%Wound Bed 0 8/14/2019 11:36 AM   Yellow%Wound Bed 10 8/19/2019  4:46 PM   Black%Wound Bed 0 8/14/2019 11:36 AM   Purple%Wound Bed 0 8/14/2019 11:36 AM   Other%Wound Bed 0 8/14/2019 11:36 AM   Number of days: 26         Estimated Blood Loss:  Minimal    Hemostasis Achieved:  by pressure    Procedural Pain:  0  / 10     Post Procedural Pain:  0 / 10     Response to treatment:  Well tolerated by patient.          Plan:     Problem List Items Addressed This Visit     Type 2 diabetes mellitus with foot ulcer, with long-term current use of insulin (HCC) (Chronic)    Relevant Medications    amoxicillin-clavulanate (AUGMENTIN) 875-125 MG per tablet    Other Relevant Orders    Wound Culture    Wound Culture    * (Principal) Midfoot ulceration, left, with fat layer exposed ([address] (Chronic)    Relevant Medications    amoxicillin-clavulanate (AUGMENTIN) 875-125 MG per tablet    Other Relevant

## 2019-08-21 ENCOUNTER — HOSPITAL ENCOUNTER (OUTPATIENT)
Dept: PREADMISSION TESTING | Age: 39
Discharge: HOME | End: 2019-08-25
Payer: COMMERCIAL

## 2019-08-21 ENCOUNTER — HOSPITAL ENCOUNTER (OUTPATIENT)
Dept: WOUND CARE | Age: 39
Discharge: HOME | End: 2019-08-21
Payer: COMMERCIAL

## 2019-08-21 ENCOUNTER — HOSPITAL ENCOUNTER (OUTPATIENT)
Dept: GENERAL RADIOLOGY | Age: 39
Discharge: HOME | End: 2019-08-21
Payer: COMMERCIAL

## 2019-08-21 VITALS — BODY MASS INDEX: 39.17 KG/M2 | HEIGHT: 75 IN | WEIGHT: 315 LBS

## 2019-08-21 VITALS
HEART RATE: 105 BPM | BODY MASS INDEX: 39.17 KG/M2 | SYSTOLIC BLOOD PRESSURE: 143 MMHG | DIASTOLIC BLOOD PRESSURE: 92 MMHG | WEIGHT: 315 LBS | RESPIRATION RATE: 16 BRPM | TEMPERATURE: 98.2 F | HEIGHT: 75 IN

## 2019-08-21 DIAGNOSIS — E11.628 DIABETIC FOOT INFECTION (HCC): ICD-10-CM

## 2019-08-21 DIAGNOSIS — L97.524 ULCERATED, FOOT, LEFT, WITH NECROSIS OF BONE (HCC): ICD-10-CM

## 2019-08-21 DIAGNOSIS — L97.412 ULCER OF RIGHT HEEL, WITH FAT LAYER EXPOSED (HCC): ICD-10-CM

## 2019-08-21 DIAGNOSIS — L02.612 ABSCESS OF LEFT FOOT: Primary | ICD-10-CM

## 2019-08-21 DIAGNOSIS — L97.422 MIDFOOT ULCERATION, LEFT, WITH FAT LAYER EXPOSED (HCC): Chronic | ICD-10-CM

## 2019-08-21 DIAGNOSIS — L08.9 DIABETIC FOOT INFECTION (HCC): ICD-10-CM

## 2019-08-21 DIAGNOSIS — L02.612 ABSCESS OF LEFT FOOT: ICD-10-CM

## 2019-08-21 PROCEDURE — 99024 POSTOP FOLLOW-UP VISIT: CPT | Performed by: SURGERY

## 2019-08-21 PROCEDURE — 73630 X-RAY EXAM OF FOOT: CPT

## 2019-08-21 PROCEDURE — 99214 OFFICE O/P EST MOD 30 MIN: CPT

## 2019-08-21 RX ORDER — ASCORBIC ACID 500 MG
1000 TABLET ORAL DAILY
COMMUNITY

## 2019-08-21 RX ORDER — AMPICILLIN TRIHYDRATE 250 MG
1000 CAPSULE ORAL 2 TIMES DAILY
COMMUNITY

## 2019-08-21 ASSESSMENT — PAIN DESCRIPTION - ONSET: ONSET: ON-GOING

## 2019-08-21 ASSESSMENT — PAIN DESCRIPTION - PAIN TYPE: TYPE: ACUTE PAIN

## 2019-08-21 ASSESSMENT — PAIN DESCRIPTION - ORIENTATION: ORIENTATION: RIGHT;LEFT

## 2019-08-21 ASSESSMENT — PAIN DESCRIPTION - FREQUENCY: FREQUENCY: INTERMITTENT

## 2019-08-21 ASSESSMENT — PAIN DESCRIPTION - LOCATION: LOCATION: FOOT

## 2019-08-21 ASSESSMENT — PAIN DESCRIPTION - DESCRIPTORS: DESCRIPTORS: ACHING;BURNING

## 2019-08-21 ASSESSMENT — PAIN SCALES - GENERAL: PAINLEVEL_OUTOF10: 2

## 2019-08-21 ASSESSMENT — PAIN DESCRIPTION - PROGRESSION: CLINICAL_PROGRESSION: NOT CHANGED

## 2019-08-21 NOTE — PLAN OF CARE
Problem: Pain:  Goal: Pain level will decrease  Description  Pain level will decrease  Outcome: Ongoing  Goal: Control of acute pain  Description  Control of acute pain  Outcome: Ongoing  Goal: Control of chronic pain  Description  Control of chronic pain  Outcome: Ongoing     Problem: Wound:  Goal: Will show signs of wound healing; wound closure and no evidence of infection  Description  Will show signs of wound healing; wound closure and no evidence of infection  Outcome: Ongoing     Problem: Weight control:  Goal: Ability to maintain an optimal weight for height and age will be supported  Description  Ability to maintain an optimal weight for height and age will be supported  Outcome: Ongoing     Problem: Blood Glucose:  Goal: Ability to maintain appropriate glucose levels will improve  Description  Ability to maintain appropriate glucose levels will improve  Outcome: Ongoing

## 2019-08-21 NOTE — PROGRESS NOTES
Depth (cm) 0.1 cm 8/21/2019  1:00 PM   Wound Surface Area (cm^2) 0.42 cm^2 8/21/2019  1:00 PM   Change in Wound Size % (l*w) 93 8/21/2019  1:00 PM   Wound Volume (cm^3) 0.04 cm^3 8/21/2019  1:00 PM   Wound Healing % 99 8/21/2019  1:00 PM   Post-Procedure Length (cm) 2 cm 8/7/2019  8:49 AM   Post-Procedure Width (cm) 1.3 cm 8/7/2019  8:49 AM   Post-Procedure Depth (cm) 0.1 cm 8/7/2019  8:49 AM   Post-Procedure Surface Area (cm^2) 2.6 cm^2 8/7/2019  8:49 AM   Post-Procedure Volume (cm^3) 0.26 cm^3 8/7/2019  8:49 AM   Distance Tunneling (cm) 0 cm 8/21/2019  1:00 PM   Tunneling Position ___ O'Clock 0 8/21/2019  1:00 PM   Undermining Starts ___ O'Clock 0 8/21/2019  1:00 PM   Undermining Ends___ O'Clock 0 8/21/2019  1:00 PM   Undermining Maxium Distance (cm) 0 8/21/2019  1:00 PM   Wound Assessment Pink;Slough; Yellow 8/21/2019  1:00 PM   Drainage Amount Moderate 8/21/2019  1:00 PM   Drainage Description Serosanguinous 8/21/2019  1:00 PM   Odor None 8/21/2019  1:00 PM   Margins Attached edges 8/21/2019  1:00 PM   Alondra-wound Assessment Dry; Intact 8/21/2019  1:00 PM   Non-staged Wound Description Full thickness 8/21/2019  1:00 PM   [place]%Wound Bed 45 8/21/2019  1:00 PM   Red%Wound Bed 45 8/21/2019  1:00 PM   Yellow%Wound Bed 10 8/21/2019  1:00 PM   Black%Wound Bed 0 8/21/2019  1:00 PM   Purple%Wound Bed 0 8/21/2019  1:00 PM   Other%Wound Bed 0 8/21/2019  1:00 PM   Number of days: 43       Wound 07/24/19 Foot Left WOUND 5 LEFT 4th and 5th toe AMP SITE Diabetic Wag 1 (Active)   Wound Image   8/21/2019  1:00 PM   Wound Diabetic May 1 8/21/2019  1:00 PM   Dressing Status Old drainage; Intact 8/21/2019  1:00 PM   Dressing Changed Changed/New 8/7/2019  9:47 AM   Dressing/Treatment 4x4 8/19/2019  6:02 PM   Wound Cleansed Rinsed/Irrigated with saline 8/21/2019  1:00 PM   Wound Length (cm) 4.8 cm 8/21/2019  1:00 PM   Wound Width (cm) 6 cm 8/21/2019  1:00 PM   Wound Depth (cm) 1.4 cm 8/21/2019  1:00 PM   Wound Surface Area (cm^2) 28.8 cm^2 8/21/2019  1:00 PM   Change in Wound Size % (l*w) 40 8/21/2019  1:00 PM   Wound Volume (cm^3) 40.32 cm^3 8/21/2019  1:00 PM   Wound Healing % 44 8/21/2019  1:00 PM   Post-Procedure Length (cm) 3.6 cm 8/19/2019  5:22 PM   Post-Procedure Width (cm) 6.5 cm 8/19/2019  5:22 PM   Post-Procedure Depth (cm) 1.4 cm 8/19/2019  5:22 PM   Post-Procedure Surface Area (cm^2) 23.4 cm^2 8/19/2019  5:22 PM   Post-Procedure Volume (cm^3) 32.76 cm^3 8/19/2019  5:22 PM   Distance Tunneling (cm) 0 cm 8/21/2019  1:00 PM   Tunneling Position ___ O'Clock 0 8/21/2019  1:00 PM   Undermining Starts ___ O'Clock 0 8/21/2019  1:00 PM   Undermining Ends___ O'Clock 0 8/21/2019  1:00 PM   Undermining Maxium Distance (cm) 0 8/21/2019  1:00 PM   Wound Assessment Pink;Slough; Yellow;Drainage 8/21/2019  1:00 PM   Drainage Amount Moderate 8/21/2019  1:00 PM   Drainage Description Serosanguinous 8/21/2019  1:00 PM   Odor None 8/21/2019  1:00 PM   Margins Attached edges 8/21/2019  1:00 PM   Exposed structure Bone 8/21/2019  1:00 PM   Alondra-wound Assessment Dry; Intact;Calloused 8/21/2019  1:00 PM   Non-staged Wound Description Not applicable 5/16/7898  3:50 PM   [place]%Wound Bed 90 8/21/2019  1:00 PM   Red%Wound Bed 0 8/21/2019  1:00 PM   Yellow%Wound Bed 10 8/21/2019  1:00 PM   Black%Wound Bed 0 8/21/2019  1:00 PM   Purple%Wound Bed 0 8/21/2019  1:00 PM   Other%Wound Bed 0 8/21/2019  1:00 PM   Number of days: 28       Wound 07/24/19 Foot Right WOUND 6 LEFT FOOT HEEL DIABETIC WAG 1 (Active)   Wound Image   8/21/2019  1:00 PM   Wound Diabetic May 1 8/21/2019  1:00 PM   Dressing Status Old drainage 8/21/2019  1:00 PM   Dressing Changed Changed/New 8/19/2019  6:02 PM   Dressing/Treatment Wound gel 7/31/2019  2:15 PM   Wound Cleansed Rinsed/Irrigated with saline 8/21/2019  1:00 PM   Wound Length (cm) 2.2 cm 8/21/2019  1:00 PM   Wound Width (cm) 1.2 cm 8/21/2019  1:00 PM   Wound Depth (cm) 0.1 cm 8/21/2019  1:00 PM   Wound Surface Area (cm^2) 2.64 cm^2 long-term current use of insulin (HCC)    Class 3 severe obesity due to excess calories with serious comorbidity and body mass index (BMI) of 45.0 to 49.9 in adult [facility])    Osteomyelitis of toe of left foot (HCC)    Midfoot ulceration, left, with fat layer exposed ([address]    Ulcer of right heel, with fat layer exposed ([address]    Diabetic foot infection ([address]    Essential hypertension    Abdominal wall cellulitis    Abscess of left foot    Ulcer of left heel, with fat layer exposed ([address]      Spent well over 40 minutes in face to face counseling. He is requesting BKA amputation, rather  than trying to heal his left foot. He is very logical, and appropriate. He feels that with this new infection, he is even less likely to heal his foot and keep it healed. He is well aware of the natural history of diabetic foot wounds and the fact that they often recurs. After speaking with him I am in agreement with his logic. I do want to meet with him Tuesday 8-27-19 at 1:30 in my other office to discuss this again and with his wife. Plan:          Plan for wound - Dress per physician order  Treatment:     Compression : No   Offloading : Yes   Dressing : see avs   Additional Therapy : He is considering left BKA     1. Discussed appropriate home care of this wound. Wound redressed. 2. Patient instructions were given. 3. Follow up: 6 days in vascular office.                          Electronically signed by Sulaiman Fair MD on 8/21/2019 at 2:19 PM

## 2019-08-22 LAB
GRAM STAIN RESULT: ABNORMAL
ORGANISM: ABNORMAL
WOUND/ABSCESS: ABNORMAL

## 2019-08-27 ENCOUNTER — HOSPITAL ENCOUNTER (OUTPATIENT)
Dept: PREADMISSION TESTING | Age: 39
Discharge: HOME | DRG: 617 | End: 2019-08-31
Payer: COMMERCIAL

## 2019-08-27 ENCOUNTER — HOSPITAL ENCOUNTER (OUTPATIENT)
Dept: GENERAL RADIOLOGY | Age: 39
Discharge: HOME | DRG: 617 | End: 2019-08-27
Payer: COMMERCIAL

## 2019-08-27 ENCOUNTER — PREP FOR PROCEDURE (OUTPATIENT)
Dept: VASCULAR SURGERY | Age: 39
End: 2019-08-27

## 2019-08-27 ENCOUNTER — OFFICE VISIT (OUTPATIENT)
Dept: VASCULAR SURGERY | Age: 39
End: 2019-08-27

## 2019-08-27 VITALS
DIASTOLIC BLOOD PRESSURE: 90 MMHG | OXYGEN SATURATION: 98 % | HEART RATE: 107 BPM | SYSTOLIC BLOOD PRESSURE: 145 MMHG | TEMPERATURE: 98.6 F | RESPIRATION RATE: 18 BRPM

## 2019-08-27 VITALS — BODY MASS INDEX: 39.17 KG/M2 | HEIGHT: 75 IN | WEIGHT: 315 LBS

## 2019-08-27 DIAGNOSIS — Z01.818 PRE-OP TESTING: ICD-10-CM

## 2019-08-27 DIAGNOSIS — M86.9 OSTEOMYELITIS OF TOE OF LEFT FOOT (HCC): Primary | Chronic | ICD-10-CM

## 2019-08-27 DIAGNOSIS — L97.422 MIDFOOT ULCERATION, LEFT, WITH FAT LAYER EXPOSED (HCC): Chronic | ICD-10-CM

## 2019-08-27 DIAGNOSIS — E11.621 DIABETIC ULCER OF TOE OF LEFT FOOT ASSOCIATED WITH TYPE 2 DIABETES MELLITUS, WITH FAT LAYER EXPOSED (HCC): ICD-10-CM

## 2019-08-27 DIAGNOSIS — L97.522 DIABETIC ULCER OF TOE OF LEFT FOOT ASSOCIATED WITH TYPE 2 DIABETES MELLITUS, WITH FAT LAYER EXPOSED (HCC): ICD-10-CM

## 2019-08-27 DIAGNOSIS — L08.9 DIABETIC FOOT INFECTION (HCC): ICD-10-CM

## 2019-08-27 DIAGNOSIS — L97.422 ULCER OF LEFT HEEL, WITH FAT LAYER EXPOSED (HCC): Chronic | ICD-10-CM

## 2019-08-27 DIAGNOSIS — E11.628 DIABETIC FOOT INFECTION (HCC): ICD-10-CM

## 2019-08-27 DIAGNOSIS — Z01.818 PRE-OP TESTING: Primary | ICD-10-CM

## 2019-08-27 PROBLEM — L97.524 ULCERATED, FOOT, LEFT, WITH NECROSIS OF BONE (HCC): Status: ACTIVE | Noted: 2019-08-27

## 2019-08-27 LAB
ANION GAP SERPL CALCULATED.3IONS-SCNC: 12 MMOL/L (ref 7–19)
APTT: 26.5 SEC (ref 26–36.2)
BUN BLDV-MCNC: 14 MG/DL (ref 6–20)
CALCIUM SERPL-MCNC: 9.5 MG/DL (ref 8.6–10)
CHLORIDE BLD-SCNC: 102 MMOL/L (ref 98–111)
CO2: 25 MMOL/L (ref 22–29)
CREAT SERPL-MCNC: 0.8 MG/DL (ref 0.5–1.2)
EKG P AXIS: 52 DEGREES
EKG P-R INTERVAL: 148 MS
EKG Q-T INTERVAL: 358 MS
EKG QRS DURATION: 98 MS
EKG QTC CALCULATION (BAZETT): 421 MS
EKG T AXIS: 34 DEGREES
GFR NON-AFRICAN AMERICAN: >60
GLUCOSE BLD-MCNC: 164 MG/DL (ref 74–109)
HCT VFR BLD CALC: 38.1 % (ref 42–52)
HEMOGLOBIN: 12.9 G/DL (ref 14–18)
INR BLD: 1.04 (ref 0.88–1.18)
MCH RBC QN AUTO: 31.2 PG (ref 27–31)
MCHC RBC AUTO-ENTMCNC: 33.9 G/DL (ref 33–37)
MCV RBC AUTO: 92 FL (ref 80–94)
PDW BLD-RTO: 12.6 % (ref 11.5–14.5)
PLATELET # BLD: 227 K/UL (ref 130–400)
PMV BLD AUTO: 10.3 FL (ref 9.4–12.4)
POTASSIUM SERPL-SCNC: 3.9 MMOL/L (ref 3.5–5)
PROTHROMBIN TIME: 13 SEC (ref 12–14.6)
RBC # BLD: 4.14 M/UL (ref 4.7–6.1)
SODIUM BLD-SCNC: 139 MMOL/L (ref 136–145)
WBC # BLD: 9.8 K/UL (ref 4.8–10.8)

## 2019-08-27 PROCEDURE — 71046 X-RAY EXAM CHEST 2 VIEWS: CPT

## 2019-08-27 PROCEDURE — 85730 THROMBOPLASTIN TIME PARTIAL: CPT

## 2019-08-27 PROCEDURE — 85610 PROTHROMBIN TIME: CPT

## 2019-08-27 PROCEDURE — 87081 CULTURE SCREEN ONLY: CPT

## 2019-08-27 PROCEDURE — 85027 COMPLETE CBC AUTOMATED: CPT

## 2019-08-27 PROCEDURE — 93005 ELECTROCARDIOGRAM TRACING: CPT

## 2019-08-27 PROCEDURE — 99024 POSTOP FOLLOW-UP VISIT: CPT | Performed by: SURGERY

## 2019-08-27 PROCEDURE — 80048 BASIC METABOLIC PNL TOTAL CA: CPT

## 2019-08-27 NOTE — PROGRESS NOTES
Patient Care Team:  ELENA Taylor as PCP - General (Nurse Practitioner)  [facility]    TODAY'S DATE:  8/27/2019  CC- Left forefoot and heel wound  Right foot wounds heel x 2   HISTORY of PRESENT ILLNESS HPI   Kareem Alvarez is a 46 y. o. male who presents today for wound evaluation.    Wound Type:diabetic, pressure and neuropathic  Wound Location:Right heel plantar, right heal medial (healed since being out of hospital) Left heel plantar, Left plantar,lateral forefoot, Also has developed wound on right anterior abdomen since being d/c's from hospital.  Modifying factors:lymphedema, diabetes, chronic pressure, shear force and obesity      7-11-19 POD-47  Procedure(s):  1) AMPUTATION LEFT FIFITH TOE at the TM level  2)NON-EXCISIONAL DEBRIDEMENT OF RIGHT MEDIAL HEEL WOUND 2 X 1CM; NON-EXCISIONAL DEBRIDEMENT OF RIGHT HEEL WOUND 3 X 1CM; NON-EXCISIONAL DEBRIDEMENT OF LEFT HEEL WOUND 4 X 2CM ( area debrided 13 sq cm)         Visiting nurse called 8-19-19 foot suddenly looked worse, Seen By Dr. Selena Ruff 8-19-19, purulence draining from left dosal foot, increased redness and swelling. Cultures done and patient started on Augmentin 875 BID empirically. He was seen by myself in the wound clinic on 8-21-19 requested left BKA, I wished to meet with him again and with his wife to confirm this plan.      Patient Active Problem List   Diagnosis Code    Esophageal dysphagia R13.10    Indigestion K30    Belching R14.2    Mixed hyperlipidemia E78.2    Diabetic ulcer of toe of left foot associated with type 2 diabetes mellitus, with fat layer exposed ([address] E11.621, L97.522    Non-pressure ulcer of toe ([address] L97.509    Cellulitis of left toe L03.032    Type 2 diabetes mellitus with foot ulcer, with long-term current use of insulin ([facility]) E11.621, L97.509, Z79.4    Class 3 severe obesity due to excess calories with serious comorbidity and body mass index (BMI) of 45.0 to 49.9 Systems    Constitutional - no significant activity change, appetite change, or unexpected weight change. ? fever or chills. No diaphoresis or significant fatigue. HENT - no significant rhinorrhea or epistaxis. No tinnitus or significant hearing loss. Eyes - no sudden vision change or amaurosis. Respiratory - no significant shortness of breath, wheezing, or stridor. No apnea, cough, or chest tightness associated with shortness of breath. Cardiovascular - no chest pain, syncope, or significant dizziness. No palpitations or significant leg swelling. Patient reports no claudication. Gastrointestinal - no abdominal swelling or pain. No blood in stool. No severe constipation, diarrhea, nausea, or vomiting. Genitourinary - No difficulty urinating, dysuria, frequency, or urgency. No flank pain or hematuria. Musculoskeletal - no back pain, gait disturbance, or myalgia. Skin - no color change, rash, pallor, bilateral heel and left forefoot wound. Neurologic - no dizziness, facial asymmetry, or light headedness. No seizures. No speech difficulty or lateralizing weakness. Hematologic - no easy bruising or excessive bleeding. Psychiatric - no severe anxiety or nervousness. No confusion. All other review of systems are negative. Physical Exam    BP (!) 145/90 Comment: left  Pulse 107   Temp 98.6 °F (37 °C)   Resp 18   SpO2 98%     Constitutional - well developed, well nourished. No diaphoresis or acute distress. HENT - head normocephalic. Right external ear canal appears normal.  Left external ear canal appears normal.  Septum appears midline. Eyes - conjunctiva normal.  EOMS normal.  No exudate. No icterus. Neck- ROM appears normal, no tracheal deviation. Cardiovascular - Regular rate and rhythm. Heart sounds are normal.  No murmur, rub, or gallop. Carotid pulses are 2+ to palpation bilaterally without bruit. Extremities - Radial and brachial pulses are 2+ to palpation bilaterally. Right femoral pulse: present 2+; Right popliteal pulse: present 1+ Right DP: present 1+; Right PT present 1+; Left femoral pulse: present 2+; Left popliteal pulse: present 1+; Left DP: present 1+; Left PT: present 1+ Bilateral heel wounds, Large wound left forefoot. Dorsal aspect of wound can probe draining tract to metatarsal bone, this is clinically osteomyelitis. No cyanosis, clubbing, or significant edema. No signs atheroembolic event. Pulmonary - effort appears normal.  No respiratory distress. Lungs - Breath sounds normal. No wheezes or rales. GI - Abdomen - soft, non tender, bowel sounds X 4 quadrants. No guarding or rebound tenderness. No distension or palpable mass. Small superficial wound left lower abdomen  Genitourinary - deferred. Musculoskeletal - ROM appears normal.  No significant edema. Neurologic - alert and oriented X 3. Physiologic. Psychiatric - mood, affect, and behavior appear normal.  Judgment and thought processes appear normal.  Skin - wounds of feet and abdomen     Post Debridement Measurements and Assessment:  Negative Pressure Wound Therapy Toe (Comment  which one) Anterior; Left (Active)   Wound Type Diabetic foot ulcer 8/14/2019 11:36 AM   Unit Type KCI 8/7/2019  9:47 AM   Dressing Type Black foam 8/7/2019  9:47 AM   Number of pieces used 1 8/7/2019  9:47 AM   Cycle Continuous 8/7/2019  9:47 AM   Target Pressure (mmHg) 125 8/7/2019  9:47 AM   Canister changed? Yes 8/7/2019  9:47 AM   Dressing Status New drainage; Intact 8/7/2019  9:47 AM   Dressing Changed Changed/New 8/7/2019  9:47 AM   Drainage Amount Moderate 8/7/2019  9:47 AM   Drainage Description Serosanguinous 8/7/2019  9:47 AM   Output (ml) 0 ml 8/7/2019  9:47 AM   Wound Assessment Granulation tissue;Pink;Red 8/7/2019  9:47 AM   Alondra-wound Assessment Dry; Intact 8/7/2019  9:47 AM   Shape irregular 8/7/2019  9:47 AM   Odor None 8/7/2019  9:47 AM   Number of days: 46       Wound 06/24/19 Heel Right;Plantar #3 right      ----------------------------------------------------------------    Electronically signed by Lois Gregory MD(Interpreting    physician) on 07/09/2019 11:41 AM     Wound Culture 8-22-19 MRSA  I spoke with patient and patients wife. I went over with them what is involved in a BKA, the limitations, the chance of non-healing, infection and other complications. I went over rehab and fact that right heel wound may slow up rehab, (althought the wound is doing well). I went over pain expectations. Both him and his wife are in agreement they want the Left BKA. He is well aware of risks of trying to get this wound healed and the very high recurrence rate with diabetic wounds. I also discussed that his right leg w2ill be at risk and bilateral amputations are much more difficult than a single side. He expressed he understands that, however, his left foot is bad enough that he is unwilling to risk a more severe infection and multiple left foot wounds. His wife also spoke very intelligently and is supportive of his decision. Will schedule left BKA. Generalized risk of operation also discussed. Risks of surgery have been reviewed with the patient including but not limited to MI, death, CVA, bleeding, nerve injury, infection, need for further surgery, pain, and extended recovery time. Assessment    1. Osteomyelitis of toe of left foot ([address]    2. Ulcer of left heel, with fat layer exposed ([address]    3. Diabetic ulcer of toe of left foot associated with type 2 diabetes mellitus, with fat layer exposed ([address]    4. Diabetic foot infection ([address]    5. Midfoot ulceration, left, with fat layer exposed ([address]          Plan    1.  Left BKA 8-30-19

## 2019-08-28 ENCOUNTER — PREP FOR PROCEDURE (OUTPATIENT)
Dept: VASCULAR SURGERY | Age: 39
End: 2019-08-28

## 2019-08-28 RX ORDER — ASPIRIN 81 MG/1
81 TABLET ORAL ONCE
Status: CANCELLED | OUTPATIENT
Start: 2019-08-28 | End: 2019-08-28

## 2019-08-28 RX ORDER — SODIUM CHLORIDE 0.9 % (FLUSH) 0.9 %
10 SYRINGE (ML) INJECTION EVERY 12 HOURS SCHEDULED
Status: CANCELLED | OUTPATIENT
Start: 2019-08-28

## 2019-08-28 RX ORDER — SODIUM CHLORIDE 0.9 % (FLUSH) 0.9 %
10 SYRINGE (ML) INJECTION PRN
Status: CANCELLED | OUTPATIENT
Start: 2019-08-28

## 2019-08-28 NOTE — H&P (VIEW-ONLY)
ulcer of toe ([facility] 01/22/2019    Cellulitis of left toe 01/22/2019    Mixed hyperlipidemia 06/14/2018    Esophageal dysphagia 09/11/2017    Indigestion 09/11/2017    Belching 09/11/2017      Current Facility-Administered Medications   Current Outpatient Medications   Medication Sig Dispense Refill    vitamin C (ASCORBIC ACID) 500 MG tablet Take 1,000 mg by mouth daily        Cinnamon 500 MG CAPS Take 1,000 mg by mouth 2 times daily        Turmeric Curcumin 500 MG CAPS Take 1 capsule by mouth 2 times daily        busPIRone (BUSPAR) 5 MG tablet TAKE 1 TABLET BY MOUTH THREE TIMES A DAY (Patient taking differently: Take 5 mg by mouth 3 times daily ) 90 tablet 5    amoxicillin-clavulanate (AUGMENTIN) 875-125 MG per tablet Take 1 tablet by mouth 2 times daily for 14 days 28 tablet 0    sodium hypochlorite (DAKINS) 0.125 % SOLN external solution Apply topically BID as directed. 1 Bottle 3    pregabalin (LYRICA) 50 MG capsule Take 50 mg by mouth 2 times daily.         losartan (COZAAR) 50 MG tablet Take 1 tablet by mouth 2 times daily 60 tablet 5    insulin glargine (BASAGLAR KWIKPEN) 100 UNIT/ML injection pen Inject 30 Units into the skin 2 times daily 5 pen 5    insulin aspart (NOVOLOG FLEXPEN) 100 UNIT/ML injection pen Inject 12 Units into the skin 3 times daily (before meals) May adjust as directed (Patient taking differently: Inject 12 Units into the skin 3 times daily (before meals) Indications: 12 u with meals plus more with sliding scale if needed May adjust as directed) 5 pen 3    blood glucose test strips (ONE TOUCH ULTRA TEST) strip Inject 1 each into the skin 3 times daily As needed.  100 strip 5    aspirin 81 MG EC tablet Take 1 tablet by mouth daily 30 tablet 0    sodium hypochlorite (DAKINS) 0.125 % SOLN external solution Apply topically 2 times daily 1 Bottle 0    Insulin Pen Needle ([employer] PEN NEEDLES 31G) 31G X 8 MM MISC 1 each by Does not apply route daily 100 each 3    Blood Glucose midline. Eyes - conjunctiva normal.  EOMS normal.  No exudate. No icterus. Neck- ROM appears normal, no tracheal deviation. Cardiovascular - Regular rate and rhythm. Heart sounds are normal.  No murmur, rub, or gallop. Carotid pulses are 2+ to palpation bilaterally without bruit. Extremities - Radial and brachial pulses are 2+ to palpation bilaterally. Right femoral pulse: present 2+; Right popliteal pulse: present 1+ Right DP: present 1+; Right PT present 1+; Left femoral pulse: present 2+; Left popliteal pulse: present 1+; Left DP: present 1+; Left PT: present 1+ Bilateral heel wounds, Large wound left forefoot. Dorsal aspect of wound can probe draining tract to metatarsal bone, this is clinically osteomyelitis. No cyanosis, clubbing, or significant edema. No signs atheroembolic event. Pulmonary - effort appears normal.  No respiratory distress. Lungs - Breath sounds normal. No wheezes or rales. GI - Abdomen - soft, non tender, bowel sounds X 4 quadrants. No guarding or rebound tenderness. No distension or palpable mass. Small superficial wound left lower abdomen  Genitourinary - deferred.       Wound Culture 8-22-19 MRSA  I spoke with patient and patients wife. I went over with them what is involved in a BKA, the limitations, the chance of non-healing, infection and other complications. I went over rehab and fact that right heel wound may slow up rehab, (althought the wound is doing well). I went over pain expectations. Both him and his wife are in agreement they want the Left BKA. He is well aware of risks of trying to get this wound healed and the very high recurrence rate with diabetic wounds. I also discussed that his right leg w2ill be at risk and bilateral amputations are much more difficult than a single side. He expressed he understands that, however, his left foot is bad enough that he is unwilling to risk a more severe infection and multiple left foot wounds.   His wife also

## 2019-08-29 ENCOUNTER — TELEPHONE (OUTPATIENT)
Dept: VASCULAR SURGERY | Age: 39
End: 2019-08-29

## 2019-08-29 LAB
MRSA CULTURE ONLY: ABNORMAL
ORGANISM: ABNORMAL

## 2019-08-29 NOTE — TELEPHONE ENCOUNTER
Lucinda at [place] [address] called to report that his nasal culture is MRSA positive.  Per Dr. Efrain Barone, he will treat patient in the hospital.

## 2019-08-30 ENCOUNTER — ANESTHESIA EVENT (OUTPATIENT)
Dept: OPERATING ROOM | Age: 39
DRG: 617 | End: 2019-08-30
Payer: COMMERCIAL

## 2019-08-30 ENCOUNTER — HOSPITAL ENCOUNTER (INPATIENT)
Age: 39
LOS: 5 days | Discharge: HOME HEALTH | DRG: 617 | End: 2019-09-04
Attending: SURGERY | Admitting: SURGERY
Payer: COMMERCIAL

## 2019-08-30 ENCOUNTER — ANESTHESIA (OUTPATIENT)
Dept: OPERATING ROOM | Age: 39
DRG: 617 | End: 2019-08-30
Payer: COMMERCIAL

## 2019-08-30 VITALS
DIASTOLIC BLOOD PRESSURE: 51 MMHG | OXYGEN SATURATION: 93 % | TEMPERATURE: 96.3 F | RESPIRATION RATE: 28 BRPM | SYSTOLIC BLOOD PRESSURE: 101 MMHG

## 2019-08-30 DIAGNOSIS — Z89.512 STATUS POST BELOW KNEE AMPUTATION OF LEFT LOWER EXTREMITY: Primary | ICD-10-CM

## 2019-08-30 PROBLEM — E11.621 DIABETIC FOOT ULCER WITH OSTEOMYELITIS (HCC): Status: ACTIVE | Noted: 2019-08-30

## 2019-08-30 PROBLEM — I10 ESSENTIAL HYPERTENSION, BENIGN: Chronic | Status: ACTIVE | Noted: 2019-08-30

## 2019-08-30 PROBLEM — M86.9 DIABETIC FOOT ULCER WITH OSTEOMYELITIS (HCC): Status: ACTIVE | Noted: 2019-08-30

## 2019-08-30 PROBLEM — L97.509 DIABETIC FOOT ULCER WITH OSTEOMYELITIS (HCC): Status: ACTIVE | Noted: 2019-08-30

## 2019-08-30 PROBLEM — E11.69 DIABETIC FOOT ULCER WITH OSTEOMYELITIS (HCC): Status: ACTIVE | Noted: 2019-08-30

## 2019-08-30 LAB
ABO/RH: NORMAL
ANTIBODY SCREEN: NORMAL
GLUCOSE BLD-MCNC: 255 MG/DL (ref 70–99)
GLUCOSE BLD-MCNC: 291 MG/DL (ref 70–99)
GLUCOSE BLD-MCNC: 346 MG/DL (ref 70–99)
HBA1C MFR BLD: 8 % (ref 4–6)
HCT VFR BLD CALC: 37.4 % (ref 42–52)
HEMOGLOBIN: 12.7 G/DL (ref 14–18)
MCH RBC QN AUTO: 31.6 PG (ref 27–31)
MCHC RBC AUTO-ENTMCNC: 34 G/DL (ref 33–37)
MCV RBC AUTO: 93 FL (ref 80–94)
PDW BLD-RTO: 12.4 % (ref 11.5–14.5)
PERFORMED ON: ABNORMAL
PLATELET # BLD: 217 K/UL (ref 130–400)
PMV BLD AUTO: 9.9 FL (ref 9.4–12.4)
RBC # BLD: 4.02 M/UL (ref 4.7–6.1)
WBC # BLD: 12.6 K/UL (ref 4.8–10.8)

## 2019-08-30 PROCEDURE — 0Y6J0Z1 DETACHMENT AT LEFT LOWER LEG, HIGH, OPEN APPROACH: ICD-10-PCS | Performed by: SURGERY

## 2019-08-30 PROCEDURE — 2580000003 HC RX 258: Performed by: ANESTHESIOLOGY

## 2019-08-30 PROCEDURE — 3700000000 HC ANESTHESIA ATTENDED CARE: Performed by: SURGERY

## 2019-08-30 PROCEDURE — 7100000001 HC PACU RECOVERY - ADDTL 15 MIN: Performed by: SURGERY

## 2019-08-30 PROCEDURE — 64447 NJX AA&/STRD FEMORAL NRV IMG: CPT | Performed by: NURSE ANESTHETIST, CERTIFIED REGISTERED

## 2019-08-30 PROCEDURE — 2580000003 HC RX 258: Performed by: PHYSICIAN ASSISTANT

## 2019-08-30 PROCEDURE — 3700000001 HC ADD 15 MINUTES (ANESTHESIA): Performed by: SURGERY

## 2019-08-30 PROCEDURE — 6370000000 HC RX 637 (ALT 250 FOR IP): Performed by: PHYSICIAN ASSISTANT

## 2019-08-30 PROCEDURE — 6370000000 HC RX 637 (ALT 250 FOR IP): Performed by: SURGERY

## 2019-08-30 PROCEDURE — 86901 BLOOD TYPING SEROLOGIC RH(D): CPT

## 2019-08-30 PROCEDURE — 85027 COMPLETE CBC AUTOMATED: CPT

## 2019-08-30 PROCEDURE — 86900 BLOOD TYPING SEROLOGIC ABO: CPT

## 2019-08-30 PROCEDURE — 6360000002 HC RX W HCPCS: Performed by: PHYSICIAN ASSISTANT

## 2019-08-30 PROCEDURE — 6360000002 HC RX W HCPCS: Performed by: ANESTHESIOLOGY

## 2019-08-30 PROCEDURE — 1210000000 HC MED SURG R&B

## 2019-08-30 PROCEDURE — 6360000002 HC RX W HCPCS

## 2019-08-30 PROCEDURE — 7100000000 HC PACU RECOVERY - FIRST 15 MIN: Performed by: SURGERY

## 2019-08-30 PROCEDURE — 6360000002 HC RX W HCPCS: Performed by: NURSE ANESTHETIST, CERTIFIED REGISTERED

## 2019-08-30 PROCEDURE — 3600000014 HC SURGERY LEVEL 4 ADDTL 15MIN: Performed by: SURGERY

## 2019-08-30 PROCEDURE — 27880 AMPUTATION OF LOWER LEG: CPT | Performed by: SURGERY

## 2019-08-30 PROCEDURE — 88305 TISSUE EXAM BY PATHOLOGIST: CPT

## 2019-08-30 PROCEDURE — 2709999900 HC NON-CHARGEABLE SUPPLY: Performed by: SURGERY

## 2019-08-30 PROCEDURE — 36415 COLL VENOUS BLD VENIPUNCTURE: CPT

## 2019-08-30 PROCEDURE — 6360000002 HC RX W HCPCS: Performed by: SURGERY

## 2019-08-30 PROCEDURE — 82948 REAGENT STRIP/BLOOD GLUCOSE: CPT

## 2019-08-30 PROCEDURE — 83036 HEMOGLOBIN GLYCOSYLATED A1C: CPT

## 2019-08-30 PROCEDURE — 2580000003 HC RX 258: Performed by: SURGERY

## 2019-08-30 PROCEDURE — 86850 RBC ANTIBODY SCREEN: CPT

## 2019-08-30 PROCEDURE — 3600000004 HC SURGERY LEVEL 4 BASE: Performed by: SURGERY

## 2019-08-30 PROCEDURE — 2500000003 HC RX 250 WO HCPCS: Performed by: NURSE ANESTHETIST, CERTIFIED REGISTERED

## 2019-08-30 RX ORDER — ONDANSETRON 2 MG/ML
4 INJECTION INTRAMUSCULAR; INTRAVENOUS EVERY 6 HOURS PRN
Status: DISCONTINUED | OUTPATIENT
Start: 2019-08-30 | End: 2019-09-04 | Stop reason: HOSPADM

## 2019-08-30 RX ORDER — HYDROCODONE BITARTRATE AND ACETAMINOPHEN 5; 325 MG/1; MG/1
2 TABLET ORAL EVERY 4 HOURS PRN
Status: DISCONTINUED | OUTPATIENT
Start: 2019-08-30 | End: 2019-08-30

## 2019-08-30 RX ORDER — SCOLOPAMINE TRANSDERMAL SYSTEM 1 MG/1
1 PATCH, EXTENDED RELEASE TRANSDERMAL ONCE
Status: DISCONTINUED | OUTPATIENT
Start: 2019-08-30 | End: 2019-08-30 | Stop reason: HOSPADM

## 2019-08-30 RX ORDER — KETOROLAC TROMETHAMINE 30 MG/ML
INJECTION, SOLUTION INTRAMUSCULAR; INTRAVENOUS PRN
Status: DISCONTINUED | OUTPATIENT
Start: 2019-08-30 | End: 2019-08-30 | Stop reason: SDUPTHER

## 2019-08-30 RX ORDER — ASPIRIN 81 MG/1
81 TABLET ORAL DAILY
Status: DISCONTINUED | OUTPATIENT
Start: 2019-08-30 | End: 2019-09-04 | Stop reason: HOSPADM

## 2019-08-30 RX ORDER — PREGABALIN 25 MG/1
50 CAPSULE ORAL 2 TIMES DAILY
Status: DISCONTINUED | OUTPATIENT
Start: 2019-08-30 | End: 2019-09-04 | Stop reason: HOSPADM

## 2019-08-30 RX ORDER — FENTANYL CITRATE 50 UG/ML
INJECTION, SOLUTION INTRAMUSCULAR; INTRAVENOUS PRN
Status: DISCONTINUED | OUTPATIENT
Start: 2019-08-30 | End: 2019-08-30 | Stop reason: SDUPTHER

## 2019-08-30 RX ORDER — DEXAMETHASONE SODIUM PHOSPHATE 10 MG/ML
INJECTION INTRAMUSCULAR; INTRAVENOUS PRN
Status: DISCONTINUED | OUTPATIENT
Start: 2019-08-30 | End: 2019-08-30 | Stop reason: SDUPTHER

## 2019-08-30 RX ORDER — MORPHINE SULFATE 2 MG/ML
2 INJECTION, SOLUTION INTRAMUSCULAR; INTRAVENOUS
Status: DISCONTINUED | OUTPATIENT
Start: 2019-08-30 | End: 2019-09-04 | Stop reason: HOSPADM

## 2019-08-30 RX ORDER — LIDOCAINE HYDROCHLORIDE 10 MG/ML
INJECTION, SOLUTION INFILTRATION; PERINEURAL PRN
Status: DISCONTINUED | OUTPATIENT
Start: 2019-08-30 | End: 2019-08-30 | Stop reason: SDUPTHER

## 2019-08-30 RX ORDER — KETOROLAC TROMETHAMINE 30 MG/ML
30 INJECTION, SOLUTION INTRAMUSCULAR; INTRAVENOUS EVERY 6 HOURS
Status: DISPENSED | OUTPATIENT
Start: 2019-08-30 | End: 2019-09-02

## 2019-08-30 RX ORDER — SUCCINYLCHOLINE/SOD CL,ISO/PF 100 MG/5ML
SYRINGE (ML) INTRAVENOUS PRN
Status: DISCONTINUED | OUTPATIENT
Start: 2019-08-30 | End: 2019-08-30 | Stop reason: SDUPTHER

## 2019-08-30 RX ORDER — SODIUM CHLORIDE 0.9 % (FLUSH) 0.9 %
10 SYRINGE (ML) INJECTION EVERY 12 HOURS SCHEDULED
Status: DISCONTINUED | OUTPATIENT
Start: 2019-08-30 | End: 2019-08-30 | Stop reason: HOSPADM

## 2019-08-30 RX ORDER — ASPIRIN 81 MG/1
81 TABLET ORAL ONCE
Status: COMPLETED | OUTPATIENT
Start: 2019-08-30 | End: 2019-08-30

## 2019-08-30 RX ORDER — SODIUM CHLORIDE 0.9 % (FLUSH) 0.9 %
10 SYRINGE (ML) INJECTION EVERY 12 HOURS SCHEDULED
Status: DISCONTINUED | OUTPATIENT
Start: 2019-08-30 | End: 2019-09-04 | Stop reason: HOSPADM

## 2019-08-30 RX ORDER — ONDANSETRON 2 MG/ML
INJECTION INTRAMUSCULAR; INTRAVENOUS PRN
Status: DISCONTINUED | OUTPATIENT
Start: 2019-08-30 | End: 2019-08-30 | Stop reason: SDUPTHER

## 2019-08-30 RX ORDER — PROPOFOL 10 MG/ML
INJECTION, EMULSION INTRAVENOUS PRN
Status: DISCONTINUED | OUTPATIENT
Start: 2019-08-30 | End: 2019-08-30 | Stop reason: SDUPTHER

## 2019-08-30 RX ORDER — ACETAMINOPHEN 325 MG/1
650 TABLET ORAL EVERY 4 HOURS
Status: DISPENSED | OUTPATIENT
Start: 2019-08-30 | End: 2019-09-02

## 2019-08-30 RX ORDER — FENTANYL CITRATE 50 UG/ML
50 INJECTION, SOLUTION INTRAMUSCULAR; INTRAVENOUS
Status: DISCONTINUED | OUTPATIENT
Start: 2019-08-30 | End: 2019-08-30 | Stop reason: HOSPADM

## 2019-08-30 RX ORDER — CLONIDINE HYDROCHLORIDE 0.1 MG/1
0.1 TABLET ORAL
Status: DISCONTINUED | OUTPATIENT
Start: 2019-08-30 | End: 2019-09-04 | Stop reason: HOSPADM

## 2019-08-30 RX ORDER — METOCLOPRAMIDE HYDROCHLORIDE 5 MG/ML
10 INJECTION INTRAMUSCULAR; INTRAVENOUS
Status: COMPLETED | OUTPATIENT
Start: 2019-08-30 | End: 2019-08-30

## 2019-08-30 RX ORDER — SODIUM CHLORIDE 0.9 % (FLUSH) 0.9 %
10 SYRINGE (ML) INJECTION PRN
Status: DISCONTINUED | OUTPATIENT
Start: 2019-08-30 | End: 2019-08-30 | Stop reason: HOSPADM

## 2019-08-30 RX ORDER — ESMOLOL HYDROCHLORIDE 10 MG/ML
INJECTION INTRAVENOUS PRN
Status: DISCONTINUED | OUTPATIENT
Start: 2019-08-30 | End: 2019-08-30 | Stop reason: SDUPTHER

## 2019-08-30 RX ORDER — HYDRALAZINE HYDROCHLORIDE 20 MG/ML
5 INJECTION INTRAMUSCULAR; INTRAVENOUS EVERY 10 MIN PRN
Status: DISCONTINUED | OUTPATIENT
Start: 2019-08-30 | End: 2019-08-30 | Stop reason: HOSPADM

## 2019-08-30 RX ORDER — MORPHINE SULFATE 2 MG/ML
4 INJECTION, SOLUTION INTRAMUSCULAR; INTRAVENOUS EVERY 5 MIN PRN
Status: DISCONTINUED | OUTPATIENT
Start: 2019-08-30 | End: 2019-08-30 | Stop reason: HOSPADM

## 2019-08-30 RX ORDER — MORPHINE SULFATE 4 MG/ML
4 INJECTION, SOLUTION INTRAMUSCULAR; INTRAVENOUS
Status: DISCONTINUED | OUTPATIENT
Start: 2019-08-30 | End: 2019-08-30 | Stop reason: HOSPADM

## 2019-08-30 RX ORDER — MEPERIDINE HYDROCHLORIDE 50 MG/ML
12.5 INJECTION INTRAMUSCULAR; INTRAVENOUS; SUBCUTANEOUS EVERY 5 MIN PRN
Status: DISCONTINUED | OUTPATIENT
Start: 2019-08-30 | End: 2019-08-30 | Stop reason: HOSPADM

## 2019-08-30 RX ORDER — DIPHENHYDRAMINE HYDROCHLORIDE 50 MG/ML
12.5 INJECTION INTRAMUSCULAR; INTRAVENOUS
Status: DISCONTINUED | OUTPATIENT
Start: 2019-08-30 | End: 2019-08-30 | Stop reason: HOSPADM

## 2019-08-30 RX ORDER — LOSARTAN POTASSIUM 50 MG/1
50 TABLET ORAL 2 TIMES DAILY
Status: DISCONTINUED | OUTPATIENT
Start: 2019-08-30 | End: 2019-09-04 | Stop reason: HOSPADM

## 2019-08-30 RX ORDER — HYDROCODONE BITARTRATE AND ACETAMINOPHEN 5; 325 MG/1; MG/1
1 TABLET ORAL EVERY 4 HOURS PRN
Status: DISCONTINUED | OUTPATIENT
Start: 2019-08-30 | End: 2019-08-30

## 2019-08-30 RX ORDER — LABETALOL 20 MG/4 ML (5 MG/ML) INTRAVENOUS SYRINGE
5 EVERY 10 MIN PRN
Status: DISCONTINUED | OUTPATIENT
Start: 2019-08-30 | End: 2019-08-30 | Stop reason: HOSPADM

## 2019-08-30 RX ORDER — PROMETHAZINE HYDROCHLORIDE 25 MG/ML
6.25 INJECTION, SOLUTION INTRAMUSCULAR; INTRAVENOUS
Status: DISCONTINUED | OUTPATIENT
Start: 2019-08-30 | End: 2019-08-30 | Stop reason: HOSPADM

## 2019-08-30 RX ORDER — MIDAZOLAM HYDROCHLORIDE 1 MG/ML
2 INJECTION INTRAMUSCULAR; INTRAVENOUS
Status: DISCONTINUED | OUTPATIENT
Start: 2019-08-30 | End: 2019-08-30 | Stop reason: HOSPADM

## 2019-08-30 RX ORDER — SODIUM CHLORIDE, SODIUM LACTATE, POTASSIUM CHLORIDE, CALCIUM CHLORIDE 600; 310; 30; 20 MG/100ML; MG/100ML; MG/100ML; MG/100ML
INJECTION, SOLUTION INTRAVENOUS CONTINUOUS
Status: DISCONTINUED | OUTPATIENT
Start: 2019-08-30 | End: 2019-08-30

## 2019-08-30 RX ORDER — ACETAMINOPHEN 325 MG/1
650 TABLET ORAL EVERY 4 HOURS PRN
Status: DISCONTINUED | OUTPATIENT
Start: 2019-08-30 | End: 2019-09-04 | Stop reason: HOSPADM

## 2019-08-30 RX ORDER — AMITRIPTYLINE HYDROCHLORIDE 10 MG/1
10 TABLET, FILM COATED ORAL NIGHTLY
Status: DISCONTINUED | OUTPATIENT
Start: 2019-08-30 | End: 2019-09-04 | Stop reason: HOSPADM

## 2019-08-30 RX ORDER — SODIUM CHLORIDE 9 MG/ML
INJECTION, SOLUTION INTRAVENOUS CONTINUOUS
Status: DISCONTINUED | OUTPATIENT
Start: 2019-08-30 | End: 2019-09-04 | Stop reason: HOSPADM

## 2019-08-30 RX ORDER — DEXTROSE MONOHYDRATE 50 MG/ML
100 INJECTION, SOLUTION INTRAVENOUS PRN
Status: DISCONTINUED | OUTPATIENT
Start: 2019-08-30 | End: 2019-09-04 | Stop reason: HOSPADM

## 2019-08-30 RX ORDER — INSULIN GLARGINE 100 [IU]/ML
30 INJECTION, SOLUTION SUBCUTANEOUS 2 TIMES DAILY
Status: DISCONTINUED | OUTPATIENT
Start: 2019-08-30 | End: 2019-09-04 | Stop reason: HOSPADM

## 2019-08-30 RX ORDER — BUSPIRONE HYDROCHLORIDE 5 MG/1
5 TABLET ORAL 3 TIMES DAILY
Status: DISCONTINUED | OUTPATIENT
Start: 2019-08-30 | End: 2019-09-04 | Stop reason: HOSPADM

## 2019-08-30 RX ORDER — LIDOCAINE HYDROCHLORIDE 10 MG/ML
1 INJECTION, SOLUTION EPIDURAL; INFILTRATION; INTRACAUDAL; PERINEURAL
Status: DISCONTINUED | OUTPATIENT
Start: 2019-08-30 | End: 2019-08-30 | Stop reason: HOSPADM

## 2019-08-30 RX ORDER — DEXTROSE MONOHYDRATE 25 G/50ML
12.5 INJECTION, SOLUTION INTRAVENOUS PRN
Status: DISCONTINUED | OUTPATIENT
Start: 2019-08-30 | End: 2019-09-04 | Stop reason: HOSPADM

## 2019-08-30 RX ORDER — SODIUM CHLORIDE 0.9 % (FLUSH) 0.9 %
10 SYRINGE (ML) INJECTION PRN
Status: DISCONTINUED | OUTPATIENT
Start: 2019-08-30 | End: 2019-09-04 | Stop reason: HOSPADM

## 2019-08-30 RX ORDER — NICOTINE POLACRILEX 4 MG
15 LOZENGE BUCCAL PRN
Status: DISCONTINUED | OUTPATIENT
Start: 2019-08-30 | End: 2019-09-04 | Stop reason: HOSPADM

## 2019-08-30 RX ORDER — MIDAZOLAM HYDROCHLORIDE 1 MG/ML
INJECTION INTRAMUSCULAR; INTRAVENOUS
Status: COMPLETED
Start: 2019-08-30 | End: 2019-08-30

## 2019-08-30 RX ORDER — MORPHINE SULFATE 2 MG/ML
2 INJECTION, SOLUTION INTRAMUSCULAR; INTRAVENOUS EVERY 5 MIN PRN
Status: DISCONTINUED | OUTPATIENT
Start: 2019-08-30 | End: 2019-08-30 | Stop reason: HOSPADM

## 2019-08-30 RX ADMIN — ACETAMINOPHEN 650 MG: 325 TABLET ORAL at 17:44

## 2019-08-30 RX ADMIN — MIDAZOLAM 2 MG: 1 INJECTION INTRAMUSCULAR; INTRAVENOUS at 10:02

## 2019-08-30 RX ADMIN — FENTANYL CITRATE 100 MCG: 50 INJECTION INTRAMUSCULAR; INTRAVENOUS at 10:57

## 2019-08-30 RX ADMIN — ASPIRIN 81 MG: 81 TABLET, COATED ORAL at 10:22

## 2019-08-30 RX ADMIN — HYDROMORPHONE HYDROCHLORIDE 0.5 MG: 1 INJECTION, SOLUTION INTRAMUSCULAR; INTRAVENOUS; SUBCUTANEOUS at 11:13

## 2019-08-30 RX ADMIN — Medication 160 MG: at 10:57

## 2019-08-30 RX ADMIN — ACETAMINOPHEN 650 MG: 325 TABLET ORAL at 20:23

## 2019-08-30 RX ADMIN — LIDOCAINE HYDROCHLORIDE 50 MG: 10 INJECTION, SOLUTION INFILTRATION; PERINEURAL at 10:57

## 2019-08-30 RX ADMIN — VANCOMYCIN HYDROCHLORIDE 2000 MG: 10 INJECTION, POWDER, LYOPHILIZED, FOR SOLUTION INTRAVENOUS at 09:42

## 2019-08-30 RX ADMIN — Medication 30 UNITS: at 20:23

## 2019-08-30 RX ADMIN — DEXTROSE MONOHYDRATE 3 G: 50 INJECTION, SOLUTION INTRAVENOUS at 11:05

## 2019-08-30 RX ADMIN — BUSPIRONE HYDROCHLORIDE 5 MG: 5 TABLET ORAL at 20:05

## 2019-08-30 RX ADMIN — SODIUM CHLORIDE 125 ML/HR: 9 INJECTION, SOLUTION INTRAVENOUS at 15:50

## 2019-08-30 RX ADMIN — METOCLOPRAMIDE 10 MG: 5 INJECTION, SOLUTION INTRAMUSCULAR; INTRAVENOUS at 13:58

## 2019-08-30 RX ADMIN — KETOROLAC TROMETHAMINE 30 MG: 30 INJECTION, SOLUTION INTRAMUSCULAR; INTRAVENOUS at 13:19

## 2019-08-30 RX ADMIN — INSULIN LISPRO 6 UNITS: 100 INJECTION, SOLUTION INTRAVENOUS; SUBCUTANEOUS at 17:53

## 2019-08-30 RX ADMIN — AMITRIPTYLINE HYDROCHLORIDE 10 MG: 10 TABLET, FILM COATED ORAL at 20:05

## 2019-08-30 RX ADMIN — MORPHINE SULFATE 2 MG: 2 INJECTION, SOLUTION INTRAMUSCULAR; INTRAVENOUS at 23:28

## 2019-08-30 RX ADMIN — HYDROMORPHONE HYDROCHLORIDE 0.5 MG: 1 INJECTION, SOLUTION INTRAMUSCULAR; INTRAVENOUS; SUBCUTANEOUS at 14:00

## 2019-08-30 RX ADMIN — SODIUM CHLORIDE, SODIUM LACTATE, POTASSIUM CHLORIDE, AND CALCIUM CHLORIDE: 600; 310; 30; 20 INJECTION, SOLUTION INTRAVENOUS at 12:07

## 2019-08-30 RX ADMIN — MORPHINE SULFATE 2 MG: 2 INJECTION, SOLUTION INTRAMUSCULAR; INTRAVENOUS at 20:03

## 2019-08-30 RX ADMIN — INSULIN LISPRO 4 UNITS: 100 INJECTION, SOLUTION INTRAVENOUS; SUBCUTANEOUS at 20:23

## 2019-08-30 RX ADMIN — PREGABALIN 50 MG: 25 CAPSULE ORAL at 20:05

## 2019-08-30 RX ADMIN — LOSARTAN POTASSIUM 50 MG: 50 TABLET ORAL at 20:05

## 2019-08-30 RX ADMIN — HYDROMORPHONE HYDROCHLORIDE 0.5 MG: 1 INJECTION, SOLUTION INTRAMUSCULAR; INTRAVENOUS; SUBCUTANEOUS at 11:40

## 2019-08-30 RX ADMIN — DEXAMETHASONE SODIUM PHOSPHATE 10 MG: 10 INJECTION INTRAMUSCULAR; INTRAVENOUS at 11:04

## 2019-08-30 RX ADMIN — ESMOLOL HYDROCHLORIDE 10 MG: 10 INJECTION, SOLUTION INTRAVENOUS at 12:20

## 2019-08-30 RX ADMIN — Medication 10 ML: at 20:06

## 2019-08-30 RX ADMIN — SODIUM CHLORIDE, SODIUM LACTATE, POTASSIUM CHLORIDE, AND CALCIUM CHLORIDE: 600; 310; 30; 20 INJECTION, SOLUTION INTRAVENOUS at 10:51

## 2019-08-30 RX ADMIN — HYDROMORPHONE HYDROCHLORIDE 0.5 MG: 1 INJECTION, SOLUTION INTRAMUSCULAR; INTRAVENOUS; SUBCUTANEOUS at 13:48

## 2019-08-30 RX ADMIN — VANCOMYCIN HYDROCHLORIDE 2000 MG: 1 INJECTION, POWDER, LYOPHILIZED, FOR SOLUTION INTRAVENOUS at 20:24

## 2019-08-30 RX ADMIN — KETOROLAC TROMETHAMINE 30 MG: 30 INJECTION, SOLUTION INTRAMUSCULAR; INTRAVENOUS at 20:23

## 2019-08-30 RX ADMIN — ONDANSETRON HYDROCHLORIDE 4 MG: 2 INJECTION, SOLUTION INTRAMUSCULAR; INTRAVENOUS at 12:01

## 2019-08-30 RX ADMIN — HYDROMORPHONE HYDROCHLORIDE 0.5 MG: 1 INJECTION, SOLUTION INTRAMUSCULAR; INTRAVENOUS; SUBCUTANEOUS at 11:27

## 2019-08-30 RX ADMIN — MORPHINE SULFATE 2 MG: 2 INJECTION, SOLUTION INTRAMUSCULAR; INTRAVENOUS at 21:05

## 2019-08-30 RX ADMIN — HYDROMORPHONE HYDROCHLORIDE 0.5 MG: 1 INJECTION, SOLUTION INTRAMUSCULAR; INTRAVENOUS; SUBCUTANEOUS at 11:55

## 2019-08-30 RX ADMIN — PROPOFOL 300 MG: 10 INJECTION, EMULSION INTRAVENOUS at 10:57

## 2019-08-30 ASSESSMENT — PAIN DESCRIPTION - ORIENTATION
ORIENTATION: LEFT

## 2019-08-30 ASSESSMENT — PAIN DESCRIPTION - FREQUENCY
FREQUENCY: CONTINUOUS

## 2019-08-30 ASSESSMENT — PAIN DESCRIPTION - PAIN TYPE
TYPE: SURGICAL PAIN

## 2019-08-30 ASSESSMENT — PAIN DESCRIPTION - LOCATION
LOCATION: KNEE

## 2019-08-30 ASSESSMENT — PAIN DESCRIPTION - DESCRIPTORS
DESCRIPTORS: ACHING;BURNING

## 2019-08-30 ASSESSMENT — PAIN SCALES - GENERAL
PAINLEVEL_OUTOF10: 7
PAINLEVEL_OUTOF10: 10
PAINLEVEL_OUTOF10: 6
PAINLEVEL_OUTOF10: 7
PAINLEVEL_OUTOF10: 0

## 2019-08-30 ASSESSMENT — PAIN DESCRIPTION - PROGRESSION
CLINICAL_PROGRESSION: NOT CHANGED

## 2019-08-30 ASSESSMENT — PAIN DESCRIPTION - ONSET
ONSET: ON-GOING

## 2019-08-30 ASSESSMENT — ENCOUNTER SYMPTOMS: SHORTNESS OF BREATH: 0

## 2019-08-30 ASSESSMENT — LIFESTYLE VARIABLES: SMOKING_STATUS: 0

## 2019-08-30 NOTE — BRIEF OP NOTE
Brief Postoperative Note  ______________________________________________________________    Patient: Marylene Grams  YOB: 1980  MRN: [id number]  Date of Procedure: 8/30/2019    Pre-Op Diagnosis: m86.9. l97.422, e11.621, l97.522 Diabetic foot infection with osteomyelitis, Left    Post-Op Diagnosis: Same       Procedure(s):  LEFT BELOW KNEE AMPUTATION    Anesthesia: Regional, General    Surgeon(s):  Kristy Thompson MD    Estimated Blood Loss (mL): 328     Complications: None    Specimens:   ID Type Source Tests Collected by Time Destination   A : left below the knee amputation Tissue Leg SURGICAL PATHOLOGY Kristy Thompson MD 8/30/2019 1153            Drains:   Negative Pressure Wound Therapy Toe (Comment  which one) Anterior; Left (Active)   Wound Type Diabetic foot ulcer 8/14/2019 11:36 AM   Unit Type KCI 8/7/2019  9:47 AM   Dressing Type Black foam 8/7/2019  9:47 AM   Number of pieces used 1 8/7/2019  9:47 AM   Cycle Continuous 8/7/2019  9:47 AM   Target Pressure (mmHg) 125 8/7/2019  9:47 AM   Canister changed? Yes 8/7/2019  9:47 AM   Dressing Status New drainage; Intact 8/7/2019  9:47 AM   Dressing Changed Changed/New 8/7/2019  9:47 AM   Drainage Amount Moderate 8/7/2019  9:47 AM   Drainage Description Serosanguinous 8/7/2019  9:47 AM   Output (ml) 0 ml 8/7/2019  9:47 AM   Wound Assessment Granulation tissue;Pink;Red 8/7/2019  9:47 AM   Alondra-wound Assessment Dry; Intact 8/7/2019  9:47 AM   Shape irregular 8/7/2019  9:47 AM   Odor None 8/7/2019  9:47 AM       Findings: Clean tissue at line of rescection  Dictation# [id number]  Bandar Mason MD  Date: 8/30/2019  Time: 1:37 PM

## 2019-08-30 NOTE — ANESTHESIA POSTPROCEDURE EVALUATION
Department of Anesthesiology  Postprocedure Note    Patient: Shimon Hamilton  MRN: [id number]  [place]: 1980  Date of evaluation: 8/30/2019  Time:  1:33 PM     Procedure Summary     Date:  08/30/19 Room / Location:  [facility] OR  / [facility] OR    Anesthesia Start:  1054 Anesthesia Stop:  1333    Procedure:  LEFT BELOW KNEE AMPUTATION (Left ) Diagnosis:  (m86.9. l97.422, e11.621, P68.832)    Surgeon:  Elke Rodríguez MD Responsible Provider:  ELENA Lira CRNA    Anesthesia Type:  general, regional ASA Status:  3          Anesthesia Type: general, regional    Arben Phase I: Arben Score: 10    Arben Phase II:      Last vitals: Reviewed and per EMR flowsheets.        Anesthesia Post Evaluation    Patient location during evaluation: PACU  Patient participation: complete - patient participated  Level of consciousness: awake and alert  Pain score: 0  Airway patency: patent  Nausea & Vomiting: no nausea and no vomiting  Complications: no  Cardiovascular status: hemodynamically stable  Respiratory status: acceptable  Hydration status: euvolemic

## 2019-08-30 NOTE — ANESTHESIA PRE PROCEDURE
times daily As needed. 7/16/19  Yes ELENA Arroyo   sodium hypochlorite (DAKINS) 0.125 % SOLN external solution Apply topically 2 times daily 7/15/19  Yes Eladio Jeffries MD   Insulin Pen Needle ([employer] PEN NEEDLES 31G) 31G X 8 MM MISC 1 each by Does not apply route daily 7/17/18  Yes ELENA Zuniga   Blood Glucose Monitoring Suppl (ONE TOUCH ULTRA 2) w/Device KIT Test fasting daily. Disp 100 strips and 100 lancets. 11/1/17  Yes Uyen Dudley MD   aspirin 81 MG EC tablet Take 1 tablet by mouth daily 7/16/19   Brandin Mohan MD       Current medications:    Current Facility-Administered Medications   Medication Dose Route Frequency Provider Last Rate Last Dose    ceFAZolin (ANCEF) 3 g in dextrose 5 % 100 mL IVPB  3 g Intravenous On Call to [facility] Focus[facility]geta Almonte PA-C        sodium chloride flush 0.9 % injection 10 mL  10 mL Intravenous 2 times per day Amberly Garcia PA-C        sodium chloride flush 0.9 % injection 10 mL  10 mL Intravenous PRN Zoey Garcia PA-C        vancomycin (VANCOCIN) 2,000 mg in sodium chloride 0.9 % 500 mL IVPB  2,000 mg Intravenous On Call to MILES JohnsonC 250 mL/hr at 08/30/19 0942 2,000 mg at 08/30/19 0942    aspirin EC tablet 81 mg  81 [facility]        midazolam (VERSED) 2 MG/2ML injection                Allergies:     Allergies   Allergen Reactions    Januvia [Sitagliptin] Swelling    Codeine Other (See Comments)     Chest pain    Niacin And Related Nausea And Vomiting       Problem List:    Patient Active Problem List   Diagnosis Code    Esophageal dysphagia R13.10    Indigestion K30    Belching R14.2    Mixed hyperlipidemia E78.2    Diabetic ulcer of toe of left foot associated with type 2 diabetes mellitus, with fat layer exposed ([facility] [address] E11.621, L97.522    Non-pressure ulcer of toe ([facility] [address] L97.509    Cellulitis of left toe L03.032    Type 2 diabetes mellitus with foot ulcer, with long-term current use of insulin ([address] E11.621, L97.509, Z79.4    Class 3 severe obesity due to excess calories with serious comorbidity and body mass index (BMI) of 45.0 to 49.9 in adult ([facility]) E66.01, Z68.42    Osteomyelitis of toe of left foot ([facility]) M86.9    Midfoot ulceration, left, with fat layer exposed ([address] L97.422    Ulcer of right heel, with fat layer exposed ([address] L97.412    Diabetic foot infection ([facility]) E11.628, L08.9    Essential hypertension I10    Abdominal wall cellulitis L03.311    Abscess of left foot L02.612    Ulcer of left heel, with fat layer exposed ([address] L97.422    Ulcerated, foot, left, with necrosis of bone ([facility]) L97.524       Past Medical History:        Diagnosis Date    Diabetes mellitus ([address]     GERD (gastroesophageal reflux disease)     Hyperlipidemia     Hypertension     Sleep apnea     no longer present with weight loss 10-5-17       Past Surgical History:        Procedure Laterality Date    COLONOSCOPY      ENDOSCOPY, COLON, DIAGNOSTIC      INCISION AND DRAINAGE Bilateral 7/9/2019    NON-EXCISIONAL DEBRIDEMENT OF RIGHT MEDIAL HEEL WOUND 2.6CM X 1.6CM; NON-EXCISIONAL DEBRIDEMENT OF RIGHT LATERAL HEEL WOUND 3CM X 1CM; EXCISIONAL DEBRIDEMENT OF SKIN AND SUBCUTANEOUS TISSUE LEFT HEEL WOUND 5CM X 2.5CM; EXCISIONAL DEBRIDEMENT OF SKIN, SUBCUTANEOUS TISSUE, AND TENDON LEFT DISTAL FOOT 9CM X 5CM performed by Pao Daniels MD at [address] [place] [address] Bilateral 7/11/2019    NON-EXCISIONAL DEBRIDEMENT OF RIGHT MEDIAL HEEL WOUND 2 X 1CM; NON-EXCISIONAL DEBRIDEMENT OF RIGHT HEEL WOUND 3 X 1CM; NON-EXCISIONAL DEBRIDEMENT OF LEFT HEEL WOUND 4 X 2CM performed by Pao Daniels MD at [address] [place] [address] EGD TRANSORAL BIOPSY SINGLE/MULTIPLE N/A 10/5/2017    Dr Taylor-w/dilation over wire, [address]-esophageal stricture-Faith (-) chronic inflammation    TOE AMPUTATION Left 1/31/2019    TJR. Left second toe amputation at the metatarsophalangeal.    TOE AMPUTATION Left 7/11/2019    AMPUTATION LEFT FIFITH

## 2019-08-31 LAB
ANION GAP SERPL CALCULATED.3IONS-SCNC: 12 MMOL/L (ref 7–19)
BUN BLDV-MCNC: 19 MG/DL (ref 6–20)
CALCIUM SERPL-MCNC: 8 MG/DL (ref 8.6–10)
CHLORIDE BLD-SCNC: 103 MMOL/L (ref 98–111)
CO2: 22 MMOL/L (ref 22–29)
CREAT SERPL-MCNC: 0.9 MG/DL (ref 0.5–1.2)
GFR NON-AFRICAN AMERICAN: >60
GLUCOSE BLD-MCNC: 169 MG/DL (ref 70–99)
GLUCOSE BLD-MCNC: 174 MG/DL (ref 70–99)
GLUCOSE BLD-MCNC: 243 MG/DL (ref 70–99)
GLUCOSE BLD-MCNC: 259 MG/DL (ref 70–99)
GLUCOSE BLD-MCNC: 292 MG/DL (ref 74–109)
HCT VFR BLD CALC: 32.4 % (ref 42–52)
HEMOGLOBIN: 11.2 G/DL (ref 14–18)
MCH RBC QN AUTO: 31.5 PG (ref 27–31)
MCHC RBC AUTO-ENTMCNC: 34.6 G/DL (ref 33–37)
MCV RBC AUTO: 91.3 FL (ref 80–94)
PDW BLD-RTO: 12.3 % (ref 11.5–14.5)
PERFORMED ON: ABNORMAL
PLATELET # BLD: 226 K/UL (ref 130–400)
PMV BLD AUTO: 10.2 FL (ref 9.4–12.4)
POTASSIUM SERPL-SCNC: 4.6 MMOL/L (ref 3.5–5)
RBC # BLD: 3.55 M/UL (ref 4.7–6.1)
SODIUM BLD-SCNC: 137 MMOL/L (ref 136–145)
WBC # BLD: 11.5 K/UL (ref 4.8–10.8)

## 2019-08-31 PROCEDURE — 6370000000 HC RX 637 (ALT 250 FOR IP): Performed by: SURGERY

## 2019-08-31 PROCEDURE — 2580000003 HC RX 258: Performed by: SURGERY

## 2019-08-31 PROCEDURE — 85027 COMPLETE CBC AUTOMATED: CPT

## 2019-08-31 PROCEDURE — 36415 COLL VENOUS BLD VENIPUNCTURE: CPT

## 2019-08-31 PROCEDURE — 82948 REAGENT STRIP/BLOOD GLUCOSE: CPT

## 2019-08-31 PROCEDURE — 1210000000 HC MED SURG R&B

## 2019-08-31 PROCEDURE — 80048 BASIC METABOLIC PNL TOTAL CA: CPT

## 2019-08-31 PROCEDURE — 6360000002 HC RX W HCPCS: Performed by: SURGERY

## 2019-08-31 PROCEDURE — 99024 POSTOP FOLLOW-UP VISIT: CPT | Performed by: SURGERY

## 2019-08-31 RX ADMIN — BUSPIRONE HYDROCHLORIDE 5 MG: 5 TABLET ORAL at 15:00

## 2019-08-31 RX ADMIN — INSULIN LISPRO 6 UNITS: 100 INJECTION, SOLUTION INTRAVENOUS; SUBCUTANEOUS at 11:46

## 2019-08-31 RX ADMIN — BUSPIRONE HYDROCHLORIDE 5 MG: 5 TABLET ORAL at 09:02

## 2019-08-31 RX ADMIN — ACETAMINOPHEN 650 MG: 325 TABLET ORAL at 00:50

## 2019-08-31 RX ADMIN — LOSARTAN POTASSIUM 50 MG: 50 TABLET ORAL at 19:57

## 2019-08-31 RX ADMIN — ACETAMINOPHEN 650 MG: 325 TABLET ORAL at 06:02

## 2019-08-31 RX ADMIN — MORPHINE SULFATE 2 MG: 2 INJECTION, SOLUTION INTRAMUSCULAR; INTRAVENOUS at 19:56

## 2019-08-31 RX ADMIN — LOSARTAN POTASSIUM 50 MG: 50 TABLET ORAL at 08:15

## 2019-08-31 RX ADMIN — ACETAMINOPHEN 650 MG: 325 TABLET ORAL at 19:56

## 2019-08-31 RX ADMIN — INSULIN LISPRO 12 UNITS: 100 INJECTION, SOLUTION INTRAVENOUS; SUBCUTANEOUS at 17:56

## 2019-08-31 RX ADMIN — AMITRIPTYLINE HYDROCHLORIDE 10 MG: 10 TABLET, FILM COATED ORAL at 19:56

## 2019-08-31 RX ADMIN — ACETAMINOPHEN 650 MG: 325 TABLET ORAL at 08:15

## 2019-08-31 RX ADMIN — KETOROLAC TROMETHAMINE 30 MG: 30 INJECTION, SOLUTION INTRAMUSCULAR; INTRAVENOUS at 23:41

## 2019-08-31 RX ADMIN — PREGABALIN 50 MG: 25 CAPSULE ORAL at 08:15

## 2019-08-31 RX ADMIN — KETOROLAC TROMETHAMINE 30 MG: 30 INJECTION, SOLUTION INTRAMUSCULAR; INTRAVENOUS at 11:46

## 2019-08-31 RX ADMIN — INSULIN LISPRO 4 UNITS: 100 INJECTION, SOLUTION INTRAVENOUS; SUBCUTANEOUS at 09:01

## 2019-08-31 RX ADMIN — MORPHINE SULFATE 2 MG: 2 INJECTION, SOLUTION INTRAMUSCULAR; INTRAVENOUS at 13:54

## 2019-08-31 RX ADMIN — KETOROLAC TROMETHAMINE 30 MG: 30 INJECTION, SOLUTION INTRAMUSCULAR; INTRAVENOUS at 00:50

## 2019-08-31 RX ADMIN — BUSPIRONE HYDROCHLORIDE 5 MG: 5 TABLET ORAL at 19:57

## 2019-08-31 RX ADMIN — ASPIRIN 81 MG: 81 TABLET, COATED ORAL at 08:15

## 2019-08-31 RX ADMIN — PREGABALIN 50 MG: 25 CAPSULE ORAL at 19:57

## 2019-08-31 RX ADMIN — Medication 30 UNITS: at 09:02

## 2019-08-31 RX ADMIN — INSULIN LISPRO 2 UNITS: 100 INJECTION, SOLUTION INTRAVENOUS; SUBCUTANEOUS at 17:56

## 2019-08-31 RX ADMIN — MORPHINE SULFATE 2 MG: 2 INJECTION, SOLUTION INTRAMUSCULAR; INTRAVENOUS at 23:41

## 2019-08-31 RX ADMIN — ACETAMINOPHEN 650 MG: 325 TABLET ORAL at 11:46

## 2019-08-31 RX ADMIN — INSULIN LISPRO 1 UNITS: 100 INJECTION, SOLUTION INTRAVENOUS; SUBCUTANEOUS at 19:56

## 2019-08-31 RX ADMIN — ACETAMINOPHEN 650 MG: 325 TABLET ORAL at 23:41

## 2019-08-31 RX ADMIN — VANCOMYCIN HYDROCHLORIDE 2000 MG: 1 INJECTION, POWDER, LYOPHILIZED, FOR SOLUTION INTRAVENOUS at 08:14

## 2019-08-31 RX ADMIN — KETOROLAC TROMETHAMINE 30 MG: 30 INJECTION, SOLUTION INTRAMUSCULAR; INTRAVENOUS at 08:15

## 2019-08-31 RX ADMIN — ENOXAPARIN SODIUM 40 MG: 40 INJECTION SUBCUTANEOUS at 08:15

## 2019-08-31 RX ADMIN — Medication 30 UNITS: at 19:56

## 2019-08-31 RX ADMIN — MORPHINE SULFATE 2 MG: 2 INJECTION, SOLUTION INTRAMUSCULAR; INTRAVENOUS at 00:50

## 2019-08-31 RX ADMIN — Medication 10 ML: at 19:57

## 2019-08-31 RX ADMIN — KETOROLAC TROMETHAMINE 30 MG: 30 INJECTION, SOLUTION INTRAMUSCULAR; INTRAVENOUS at 19:56

## 2019-08-31 RX ADMIN — VANCOMYCIN HYDROCHLORIDE 2000 MG: 1 INJECTION, POWDER, LYOPHILIZED, FOR SOLUTION INTRAVENOUS at 19:57

## 2019-08-31 RX ADMIN — MORPHINE SULFATE 2 MG: 2 INJECTION, SOLUTION INTRAMUSCULAR; INTRAVENOUS at 06:32

## 2019-08-31 ASSESSMENT — PAIN SCALES - GENERAL
PAINLEVEL_OUTOF10: 0
PAINLEVEL_OUTOF10: 7
PAINLEVEL_OUTOF10: 10
PAINLEVEL_OUTOF10: 0
PAINLEVEL_OUTOF10: 2
PAINLEVEL_OUTOF10: 7
PAINLEVEL_OUTOF10: 8
PAINLEVEL_OUTOF10: 8
PAINLEVEL_OUTOF10: 0
PAINLEVEL_OUTOF10: 7
PAINLEVEL_OUTOF10: 0

## 2019-08-31 ASSESSMENT — PAIN DESCRIPTION - ORIENTATION
ORIENTATION: LEFT

## 2019-08-31 ASSESSMENT — PAIN DESCRIPTION - FREQUENCY
FREQUENCY: CONTINUOUS

## 2019-08-31 ASSESSMENT — PAIN DESCRIPTION - PAIN TYPE
TYPE: SURGICAL PAIN

## 2019-08-31 ASSESSMENT — PAIN DESCRIPTION - ONSET
ONSET: ON-GOING

## 2019-08-31 ASSESSMENT — PAIN DESCRIPTION - PROGRESSION
CLINICAL_PROGRESSION: NOT CHANGED

## 2019-08-31 ASSESSMENT — PAIN DESCRIPTION - DESCRIPTORS
DESCRIPTORS: ACHING;BURNING

## 2019-08-31 ASSESSMENT — PAIN DESCRIPTION - LOCATION
LOCATION: KNEE

## 2019-08-31 NOTE — PLAN OF CARE
integrity will decrease  Description  Risk for impaired skin integrity will decrease  Outcome: Ongoing     Problem: Infection - Methicillin-Resistant Staphylococcus Aureus Infection:  Goal: Absence of methicillin-resistant Staphylococcus aureus infection  Description  Absence of methicillin-resistant Staphylococcus aureus infection  8/31/2019 0429 by Kwaku Zuñiga RN  Outcome: Ongoing  8/30/2019 1636 by Alberto Morales RN  Outcome: Ongoing     Problem: ABCDS Injury Assessment  Goal: Absence of physical injury  Outcome: Ongoing     Problem: Discharge Planning:  Goal: Discharged to appropriate level of care  Description  Discharged to appropriate level of care  Outcome: Ongoing  Goal: Participates in care planning  Description  Participates in care planning  Outcome: Ongoing     Problem: Serum Glucose Level - Abnormal:  Goal: Ability to maintain appropriate glucose levels will improve  Description  Ability to maintain appropriate glucose levels will improve  Outcome: Ongoing  Goal: Ability to maintain appropriate glucose levels will improve to within specified parameters  Description  Ability to maintain appropriate glucose levels will improve to within specified parameters  Outcome: Ongoing     Problem: Sensory Perception - Impaired:  Goal: Ability to maintain a stable neurologic state will improve  Description  Ability to maintain a stable neurologic state will improve  Outcome: Ongoing     Problem:  Activity:  Goal: Risk for activity intolerance will decrease  Description  Risk for activity intolerance will decrease  Outcome: Ongoing     Problem: Coping:  Goal: Ability to adjust to condition or change in health will improve  Description  Ability to adjust to condition or change in health will improve  Outcome: Ongoing     Problem: Fluid Volume:  Goal: Ability to maintain a balanced intake and output will improve  Description  Ability to maintain a balanced intake and output will improve  Outcome: Ongoing

## 2019-09-01 LAB
GLUCOSE BLD-MCNC: 156 MG/DL (ref 70–99)
GLUCOSE BLD-MCNC: 163 MG/DL (ref 70–99)
GLUCOSE BLD-MCNC: 190 MG/DL (ref 70–99)
GLUCOSE BLD-MCNC: 194 MG/DL (ref 70–99)
PERFORMED ON: ABNORMAL

## 2019-09-01 PROCEDURE — 6370000000 HC RX 637 (ALT 250 FOR IP): Performed by: SURGERY

## 2019-09-01 PROCEDURE — 1210000000 HC MED SURG R&B

## 2019-09-01 PROCEDURE — 82948 REAGENT STRIP/BLOOD GLUCOSE: CPT

## 2019-09-01 PROCEDURE — 6360000002 HC RX W HCPCS: Performed by: SURGERY

## 2019-09-01 PROCEDURE — 2580000003 HC RX 258: Performed by: SURGERY

## 2019-09-01 PROCEDURE — 99024 POSTOP FOLLOW-UP VISIT: CPT | Performed by: SURGERY

## 2019-09-01 RX ADMIN — BUSPIRONE HYDROCHLORIDE 5 MG: 5 TABLET ORAL at 08:12

## 2019-09-01 RX ADMIN — LOSARTAN POTASSIUM 50 MG: 50 TABLET ORAL at 08:12

## 2019-09-01 RX ADMIN — INSULIN LISPRO 2 UNITS: 100 INJECTION, SOLUTION INTRAVENOUS; SUBCUTANEOUS at 09:21

## 2019-09-01 RX ADMIN — ASPIRIN 81 MG: 81 TABLET, COATED ORAL at 08:12

## 2019-09-01 RX ADMIN — INSULIN LISPRO 12 UNITS: 100 INJECTION, SOLUTION INTRAVENOUS; SUBCUTANEOUS at 12:17

## 2019-09-01 RX ADMIN — AMITRIPTYLINE HYDROCHLORIDE 10 MG: 10 TABLET, FILM COATED ORAL at 20:10

## 2019-09-01 RX ADMIN — INSULIN LISPRO 2 UNITS: 100 INJECTION, SOLUTION INTRAVENOUS; SUBCUTANEOUS at 17:13

## 2019-09-01 RX ADMIN — ACETAMINOPHEN 650 MG: 325 TABLET ORAL at 05:47

## 2019-09-01 RX ADMIN — INSULIN LISPRO 2 UNITS: 100 INJECTION, SOLUTION INTRAVENOUS; SUBCUTANEOUS at 12:16

## 2019-09-01 RX ADMIN — Medication 10 ML: at 20:10

## 2019-09-01 RX ADMIN — MORPHINE SULFATE 2 MG: 2 INJECTION, SOLUTION INTRAMUSCULAR; INTRAVENOUS at 05:46

## 2019-09-01 RX ADMIN — INSULIN LISPRO 12 UNITS: 100 INJECTION, SOLUTION INTRAVENOUS; SUBCUTANEOUS at 08:13

## 2019-09-01 RX ADMIN — MORPHINE SULFATE 2 MG: 2 INJECTION, SOLUTION INTRAMUSCULAR; INTRAVENOUS at 20:10

## 2019-09-01 RX ADMIN — BUSPIRONE HYDROCHLORIDE 5 MG: 5 TABLET ORAL at 20:10

## 2019-09-01 RX ADMIN — INSULIN LISPRO 1 UNITS: 100 INJECTION, SOLUTION INTRAVENOUS; SUBCUTANEOUS at 20:09

## 2019-09-01 RX ADMIN — PREGABALIN 50 MG: 25 CAPSULE ORAL at 20:10

## 2019-09-01 RX ADMIN — METOPROLOL TARTRATE 25 MG: 25 TABLET ORAL at 20:10

## 2019-09-01 RX ADMIN — ACETAMINOPHEN 650 MG: 325 TABLET ORAL at 20:10

## 2019-09-01 RX ADMIN — BUSPIRONE HYDROCHLORIDE 5 MG: 5 TABLET ORAL at 13:37

## 2019-09-01 RX ADMIN — ACETAMINOPHEN 650 MG: 325 TABLET ORAL at 11:58

## 2019-09-01 RX ADMIN — ACETAMINOPHEN 650 MG: 325 TABLET ORAL at 08:12

## 2019-09-01 RX ADMIN — KETOROLAC TROMETHAMINE 30 MG: 30 INJECTION, SOLUTION INTRAMUSCULAR; INTRAVENOUS at 08:12

## 2019-09-01 RX ADMIN — MORPHINE SULFATE 2 MG: 2 INJECTION, SOLUTION INTRAMUSCULAR; INTRAVENOUS at 14:54

## 2019-09-01 RX ADMIN — KETOROLAC TROMETHAMINE 30 MG: 30 INJECTION, SOLUTION INTRAMUSCULAR; INTRAVENOUS at 11:58

## 2019-09-01 RX ADMIN — LOSARTAN POTASSIUM 50 MG: 50 TABLET ORAL at 20:10

## 2019-09-01 RX ADMIN — INSULIN LISPRO 12 UNITS: 100 INJECTION, SOLUTION INTRAVENOUS; SUBCUTANEOUS at 17:13

## 2019-09-01 RX ADMIN — KETOROLAC TROMETHAMINE 30 MG: 30 INJECTION, SOLUTION INTRAMUSCULAR; INTRAVENOUS at 20:08

## 2019-09-01 RX ADMIN — PREGABALIN 50 MG: 25 CAPSULE ORAL at 08:12

## 2019-09-01 RX ADMIN — Medication 30 UNITS: at 20:09

## 2019-09-01 RX ADMIN — Medication 30 UNITS: at 08:12

## 2019-09-01 ASSESSMENT — PAIN SCALES - GENERAL
PAINLEVEL_OUTOF10: 0
PAINLEVEL_OUTOF10: 0
PAINLEVEL_OUTOF10: 7
PAINLEVEL_OUTOF10: 0
PAINLEVEL_OUTOF10: 0
PAINLEVEL_OUTOF10: 3
PAINLEVEL_OUTOF10: 3
PAINLEVEL_OUTOF10: 0
PAINLEVEL_OUTOF10: 0
PAINLEVEL_OUTOF10: 8
PAINLEVEL_OUTOF10: 0
PAINLEVEL_OUTOF10: 7

## 2019-09-01 ASSESSMENT — PAIN DESCRIPTION - PAIN TYPE
TYPE: SURGICAL PAIN
TYPE: SURGICAL PAIN

## 2019-09-01 ASSESSMENT — PAIN DESCRIPTION - ONSET
ONSET: ON-GOING
ONSET: ON-GOING

## 2019-09-01 ASSESSMENT — PAIN DESCRIPTION - FREQUENCY
FREQUENCY: CONTINUOUS
FREQUENCY: CONTINUOUS

## 2019-09-01 ASSESSMENT — PAIN DESCRIPTION - ORIENTATION
ORIENTATION: LEFT
ORIENTATION: LEFT

## 2019-09-01 ASSESSMENT — PAIN - FUNCTIONAL ASSESSMENT
PAIN_FUNCTIONAL_ASSESSMENT: PREVENTS OR INTERFERES SOME ACTIVE ACTIVITIES AND ADLS
PAIN_FUNCTIONAL_ASSESSMENT: PREVENTS OR INTERFERES SOME ACTIVE ACTIVITIES AND ADLS

## 2019-09-01 ASSESSMENT — PAIN DESCRIPTION - LOCATION
LOCATION: KNEE
LOCATION: KNEE

## 2019-09-01 ASSESSMENT — PAIN DESCRIPTION - PROGRESSION
CLINICAL_PROGRESSION: GRADUALLY IMPROVING
CLINICAL_PROGRESSION: NOT CHANGED

## 2019-09-01 ASSESSMENT — PAIN DESCRIPTION - DESCRIPTORS
DESCRIPTORS: ACHING;BURNING
DESCRIPTORS: ACHING;BURNING

## 2019-09-01 NOTE — PLAN OF CARE
Staphylococcus aureus infection  Description  Absence of methicillin-resistant Staphylococcus aureus infection  9/1/2019 0408 by Jose Manuel Stallworth RN  Outcome: Ongoing  8/31/2019 1500 by Fatimah Agarwal RN  Outcome: Ongoing     Problem: ABCDS Injury Assessment  Goal: Absence of physical injury  9/1/2019 0408 by Jose Manuel Stallworth RN  Outcome: Ongoing  8/31/2019 1500 by Fatimah Agarwal RN  Outcome: Ongoing     Problem: Discharge Planning:  Goal: Discharged to appropriate level of care  Description  Discharged to appropriate level of care  9/1/2019 0408 by Jose Manuel Stallworth RN  Outcome: Ongoing  8/31/2019 1500 by Fatimah Agarwal RN  Outcome: Ongoing  Goal: Participates in care planning  Description  Participates in care planning  9/1/2019 0408 by Jose Manuel Stallworth RN  Outcome: Ongoing  8/31/2019 1500 by Fatimah Agarwal RN  Outcome: Ongoing     Problem: Serum Glucose Level - Abnormal:  Goal: Ability to maintain appropriate glucose levels will improve  Description  Ability to maintain appropriate glucose levels will improve  9/1/2019 0408 by Jose Manuel Stallworth RN  Outcome: Ongoing  8/31/2019 1500 by Fatimah Agarwal RN  Outcome: Ongoing  Goal: Ability to maintain appropriate glucose levels will improve to within specified parameters  Description  Ability to maintain appropriate glucose levels will improve to within specified parameters  9/1/2019 0408 by Jose Manuel Stallworth RN  Outcome: Ongoing  8/31/2019 1500 by Fatimah Agarwal RN  Outcome: Ongoing     Problem: Sensory Perception - Impaired:  Goal: Ability to maintain a stable neurologic state will improve  Description  Ability to maintain a stable neurologic state will improve  9/1/2019 0408 by Jose Manuel Stallworth RN  Outcome: Ongoing  8/31/2019 1500 by Fatimah Agarwal RN  Outcome: Ongoing     Problem:  Activity:  Goal: Risk for activity intolerance will decrease  Description  Risk for activity intolerance will decrease  9/1/2019 0408 by Jose Manuel Stallworth RN  Outcome: Ongoing  8/31/2019 1500 by Daja Gaxiola RN  Outcome: Ongoing     Problem: Coping:  Goal: Ability to adjust to condition or change in health will improve  Description  Ability to adjust to condition or change in health will improve  9/1/2019 0408 by Luca Gregory RN  Outcome: Ongoing  8/31/2019 1500 by Daja Gaxiola RN  Outcome: Ongoing     Problem: Fluid Volume:  Goal: Ability to maintain a balanced intake and output will improve  Description  Ability to maintain a balanced intake and output will improve  9/1/2019 0408 by Luca Gregory RN  Outcome: Ongoing  8/31/2019 1500 by Daja Gaxiola RN  Outcome: Ongoing     Problem: Health Behavior:  Goal: Ability to identify and utilize available resources and services will improve  Description  Ability to identify and utilize available resources and services will improve  9/1/2019 0408 by Luca Gregory RN  Outcome: Ongoing  8/31/2019 1500 by Daja Gaxiola RN  Outcome: Ongoing  Goal: Ability to manage health-related needs will improve  Description  Ability to manage health-related needs will improve  9/1/2019 0408 by Luca Gregory RN  Outcome: Ongoing  8/31/2019 1500 by Daja Gaxiola RN  Outcome: Ongoing     Problem: Metabolic:  Goal: Ability to maintain appropriate glucose levels will improve  Description  Ability to maintain appropriate glucose levels will improve  9/1/2019 0408 by Luca Gregory RN  Outcome: Ongoing  8/31/2019 1500 by Daja Gaxiola RN  Outcome: Ongoing     Problem: Nutritional:  Goal: Maintenance of adequate nutrition will improve  Description  Maintenance of adequate nutrition will improve  9/1/2019 0408 by Luca Gregory RN  Outcome: Ongoing  8/31/2019 1500 by Daja Gaxiola RN  Outcome: Ongoing  Goal: Progress toward achieving an optimal weight will improve  Description  Progress toward achieving an optimal weight will improve  9/1/2019 0408 by Luca Gregory RN  Outcome: Ongoing  8/31/2019 1500 by Daja Gaxiola RN  Outcome: Ongoing     Problem: Physical improve  9/1/2019 0408 by Nasrin Gregory RN  Outcome: Ongoing  8/31/2019 1500 by Olayinka Capps RN  Outcome: Ongoing  Goal: Control of acute pain  Description  Control of acute pain  9/1/2019 0408 by Nasrin Gregory RN  Outcome: Ongoing  8/31/2019 1500 by Olayinka Capps RN  Outcome: Ongoing  Goal: Control of chronic pain  Description  Control of chronic pain  9/1/2019 0408 by Nasrin Gregory RN  Outcome: Ongoing  8/31/2019 1500 by Olayinka Capps RN  Outcome: Ongoing     Problem: Skin Integrity - Impaired:  Goal: Will show no infection signs and symptoms  Description  Will show no infection signs and symptoms  9/1/2019 0408 by Nasrin Gregory RN  Outcome: Ongoing  8/31/2019 1500 by Olayinka Capps RN  Outcome: Ongoing  Goal: Absence of new skin breakdown  Description  Absence of new skin breakdown  9/1/2019 0408 by Nasrin Gregory RN  Outcome: Ongoing  8/31/2019 1500 by Olayinka Capps RN  Outcome: Ongoing     Problem: Sleep Pattern Disturbance:  Goal: Appears well-rested  Description  Appears well-rested  9/1/2019 0408 by Nasrin Gregory RN  Outcome: Ongoing  8/31/2019 1500 by Olayinka Capps RN  Outcome: Ongoing     Problem: Infection:  Goal: Will remain free from infection  Description  Will remain free from infection  Outcome: Ongoing     Problem: Safety:  Goal: Free from accidental physical injury  Description  Free from accidental physical injury  Outcome: Ongoing  Goal: Free from intentional harm  Description  Free from intentional harm  Outcome: Ongoing     Problem: Daily Care:  Goal: Daily care needs are met  Description  Daily care needs are met  Outcome: Ongoing     Problem: Skin Integrity:  Goal: Skin integrity will stabilize  Description  Skin integrity will stabilize  Outcome: Ongoing     Problem: Discharge Planning:  Goal: Patients continuum of care needs are met  Description  Patients continuum of care needs are met  Outcome: Ongoing

## 2019-09-02 LAB
GLUCOSE BLD-MCNC: 183 MG/DL (ref 70–99)
GLUCOSE BLD-MCNC: 190 MG/DL (ref 70–99)
GLUCOSE BLD-MCNC: 204 MG/DL (ref 70–99)
GLUCOSE BLD-MCNC: 241 MG/DL (ref 70–99)
PERFORMED ON: ABNORMAL

## 2019-09-02 PROCEDURE — 6360000002 HC RX W HCPCS: Performed by: SURGERY

## 2019-09-02 PROCEDURE — 82948 REAGENT STRIP/BLOOD GLUCOSE: CPT

## 2019-09-02 PROCEDURE — 1210000000 HC MED SURG R&B

## 2019-09-02 PROCEDURE — 6370000000 HC RX 637 (ALT 250 FOR IP): Performed by: SURGERY

## 2019-09-02 PROCEDURE — 99024 POSTOP FOLLOW-UP VISIT: CPT | Performed by: SURGERY

## 2019-09-02 PROCEDURE — 2580000003 HC RX 258: Performed by: SURGERY

## 2019-09-02 RX ADMIN — BUSPIRONE HYDROCHLORIDE 5 MG: 5 TABLET ORAL at 21:04

## 2019-09-02 RX ADMIN — INSULIN LISPRO 12 UNITS: 100 INJECTION, SOLUTION INTRAVENOUS; SUBCUTANEOUS at 12:16

## 2019-09-02 RX ADMIN — ACETAMINOPHEN 650 MG: 325 TABLET ORAL at 06:22

## 2019-09-02 RX ADMIN — BUSPIRONE HYDROCHLORIDE 5 MG: 5 TABLET ORAL at 08:37

## 2019-09-02 RX ADMIN — ASPIRIN 81 MG: 81 TABLET, COATED ORAL at 08:38

## 2019-09-02 RX ADMIN — Medication 10 ML: at 06:22

## 2019-09-02 RX ADMIN — Medication 30 UNITS: at 08:36

## 2019-09-02 RX ADMIN — BUSPIRONE HYDROCHLORIDE 5 MG: 5 TABLET ORAL at 14:29

## 2019-09-02 RX ADMIN — ACETAMINOPHEN 650 MG: 325 TABLET ORAL at 21:05

## 2019-09-02 RX ADMIN — INSULIN LISPRO 2 UNITS: 100 INJECTION, SOLUTION INTRAVENOUS; SUBCUTANEOUS at 12:22

## 2019-09-02 RX ADMIN — LOSARTAN POTASSIUM 50 MG: 50 TABLET ORAL at 08:37

## 2019-09-02 RX ADMIN — INSULIN LISPRO 12 UNITS: 100 INJECTION, SOLUTION INTRAVENOUS; SUBCUTANEOUS at 08:40

## 2019-09-02 RX ADMIN — MORPHINE SULFATE 2 MG: 2 INJECTION, SOLUTION INTRAMUSCULAR; INTRAVENOUS at 15:08

## 2019-09-02 RX ADMIN — MORPHINE SULFATE 2 MG: 2 INJECTION, SOLUTION INTRAMUSCULAR; INTRAVENOUS at 06:22

## 2019-09-02 RX ADMIN — AMITRIPTYLINE HYDROCHLORIDE 10 MG: 10 TABLET, FILM COATED ORAL at 21:04

## 2019-09-02 RX ADMIN — PREGABALIN 50 MG: 25 CAPSULE ORAL at 21:05

## 2019-09-02 RX ADMIN — KETOROLAC TROMETHAMINE 30 MG: 30 INJECTION, SOLUTION INTRAMUSCULAR; INTRAVENOUS at 06:22

## 2019-09-02 RX ADMIN — MORPHINE SULFATE 2 MG: 2 INJECTION, SOLUTION INTRAMUSCULAR; INTRAVENOUS at 21:01

## 2019-09-02 RX ADMIN — Medication 10 ML: at 08:38

## 2019-09-02 RX ADMIN — INSULIN LISPRO 12 UNITS: 100 INJECTION, SOLUTION INTRAVENOUS; SUBCUTANEOUS at 17:39

## 2019-09-02 RX ADMIN — INSULIN LISPRO 2 UNITS: 100 INJECTION, SOLUTION INTRAVENOUS; SUBCUTANEOUS at 17:39

## 2019-09-02 RX ADMIN — INSULIN LISPRO 4 UNITS: 100 INJECTION, SOLUTION INTRAVENOUS; SUBCUTANEOUS at 08:36

## 2019-09-02 RX ADMIN — Medication 10 ML: at 21:01

## 2019-09-02 RX ADMIN — LOSARTAN POTASSIUM 50 MG: 50 TABLET ORAL at 21:05

## 2019-09-02 RX ADMIN — Medication 30 UNITS: at 21:05

## 2019-09-02 RX ADMIN — ENOXAPARIN SODIUM 40 MG: 40 INJECTION SUBCUTANEOUS at 08:38

## 2019-09-02 RX ADMIN — INSULIN LISPRO 2 UNITS: 100 INJECTION, SOLUTION INTRAVENOUS; SUBCUTANEOUS at 21:05

## 2019-09-02 RX ADMIN — PREGABALIN 50 MG: 25 CAPSULE ORAL at 08:37

## 2019-09-02 RX ADMIN — ACETAMINOPHEN 650 MG: 325 TABLET ORAL at 12:16

## 2019-09-02 ASSESSMENT — PAIN SCALES - GENERAL
PAINLEVEL_OUTOF10: 0
PAINLEVEL_OUTOF10: 3
PAINLEVEL_OUTOF10: 0
PAINLEVEL_OUTOF10: 0
PAINLEVEL_OUTOF10: 7
PAINLEVEL_OUTOF10: 0
PAINLEVEL_OUTOF10: 0
PAINLEVEL_OUTOF10: 7
PAINLEVEL_OUTOF10: 9

## 2019-09-02 ASSESSMENT — PAIN DESCRIPTION - ONSET
ONSET: ON-GOING
ONSET: ON-GOING

## 2019-09-02 ASSESSMENT — PAIN DESCRIPTION - LOCATION
LOCATION: KNEE
LOCATION: KNEE

## 2019-09-02 ASSESSMENT — PAIN DESCRIPTION - DESCRIPTORS
DESCRIPTORS: ACHING;BURNING
DESCRIPTORS: ACHING;BURNING

## 2019-09-02 ASSESSMENT — PAIN DESCRIPTION - PROGRESSION
CLINICAL_PROGRESSION: NOT CHANGED
CLINICAL_PROGRESSION: GRADUALLY IMPROVING

## 2019-09-02 ASSESSMENT — PAIN DESCRIPTION - FREQUENCY
FREQUENCY: CONTINUOUS
FREQUENCY: CONTINUOUS

## 2019-09-02 ASSESSMENT — PAIN DESCRIPTION - ORIENTATION
ORIENTATION: LEFT
ORIENTATION: LEFT

## 2019-09-02 ASSESSMENT — PAIN DESCRIPTION - PAIN TYPE
TYPE: SURGICAL PAIN
TYPE: SURGICAL PAIN

## 2019-09-02 NOTE — PLAN OF CARE
Ability to adjust to condition or change in health will improve  Description  Ability to adjust to condition or change in health will improve  9/2/2019 1005 by Juliann Piña RN  Outcome: Ongoing  9/2/2019 0114 by Lamberto Jean RN  Outcome: Ongoing     Problem: Fluid Volume:  Goal: Ability to maintain a balanced intake and output will improve  Description  Ability to maintain a balanced intake and output will improve  9/2/2019 1005 by Juliann Piña RN  Outcome: Ongoing  9/2/2019 0114 by Lamberto Jean RN  Outcome: Ongoing     Problem: Health Behavior:  Goal: Ability to identify and utilize available resources and services will improve  Description  Ability to identify and utilize available resources and services will improve  9/2/2019 1005 by Juliann Piña RN  Outcome: Ongoing  9/2/2019 0114 by Lamberto Jean RN  Outcome: Ongoing  Goal: Ability to manage health-related needs will improve  Description  Ability to manage health-related needs will improve  9/2/2019 1005 by Juliann Piña RN  Outcome: Ongoing  9/2/2019 0114 by Lamberto Jean RN  Outcome: Ongoing     Problem: Metabolic:  Goal: Ability to maintain appropriate glucose levels will improve  Description  Ability to maintain appropriate glucose levels will improve  9/2/2019 1005 by Juliann Piña RN  Outcome: Ongoing  9/2/2019 0114 by Lamberto Jean RN  Outcome: Ongoing     Problem: Nutritional:  Goal: Maintenance of adequate nutrition will improve  Description  Maintenance of adequate nutrition will improve  9/2/2019 1005 by Juliann Piña RN  Outcome: Ongoing  9/2/2019 0114 by Lamberto Jean RN  Outcome: Ongoing  Goal: Progress toward achieving an optimal weight will improve  Description  Progress toward achieving an optimal weight will improve  9/2/2019 1005 by Juliann Piña RN  Outcome: Ongoing  9/2/2019 0114 by Lamberto Jean RN  Outcome: Ongoing     Problem: Physical Regulation:  Goal: Complications related to the disease process, condition or treatment will be avoided within specified parameters  9/2/2019 1005 by Joe Lovelace RN  Outcome: Ongoing  9/2/2019 0114 by Kwaku Zuñiga RN  Outcome: Ongoing     Problem: Mental Status - Impaired:  Goal: Absence of physical injury  Description  Absence of physical injury  9/2/2019 1005 by Joe Lovelace RN  Outcome: Ongoing  9/2/2019 0114 by Kwaku Zuñiga RN  Outcome: Ongoing  Goal: Absence of continued neurological deterioration signs and symptoms  Description  Absence of continued neurological deterioration signs and symptoms  9/2/2019 1005 by Joe Lovelace RN  Outcome: Ongoing  9/2/2019 0114 by Kwaku Zuñiga RN  Outcome: Ongoing  Goal: Mental status will be restored to baseline  Description  Mental status will be restored to baseline  9/2/2019 1005 by Joe Lovelace RN  Outcome: Ongoing  9/2/2019 0114 by Kwaku Zuñiga RN  Outcome: Ongoing     Problem: Mobility - Impaired:  Goal: Mobility will improve to maximum level  Description  Mobility will improve to maximum level  9/2/2019 1005 by Joe Lovelace RN  Outcome: Ongoing  9/2/2019 0114 by Kwaku Zuñiga RN  Outcome: Ongoing     Problem: Nutrition Deficit:  Goal: Ability to achieve adequate nutritional intake will improve  Description  Ability to achieve adequate nutritional intake will improve  9/2/2019 1005 by Joe Lovelace RN  Outcome: Ongoing  9/2/2019 0114 by Kwaku Zuñiga RN  Outcome: Ongoing     Problem: Pain:  Goal: Pain level will decrease  Description  Pain level will decrease  9/2/2019 1005 by Joe Lovelace RN  Outcome: Ongoing  9/2/2019 0114 by Kwaku Zuñiga RN  Outcome: Ongoing  Goal: Ability to notify healthcare provider of pain before it becomes unmanageable or unbearable will improve  Description  Ability to notify healthcare provider of pain before it becomes unmanageable or unbearable will improve  9/2/2019 1005 by Joe Lovelace RN  Outcome: Ongoing  9/2/2019 0114 by Kwaku Zuñiga RN  Outcome: Ongoing  Goal: Control of acute pain  Description  Control of acute pain  9/2/2019 1005 by Laxmi Osborn RN  Outcome: Ongoing  9/2/2019 0114 by Félix Ellis RN  Outcome: Ongoing  Goal: Control of chronic pain  Description  Control of chronic pain  9/2/2019 1005 by Laxmi Osborn RN  Outcome: Ongoing  9/2/2019 0114 by Félix Ellis RN  Outcome: Ongoing     Problem: Skin Integrity - Impaired:  Goal: Will show no infection signs and symptoms  Description  Will show no infection signs and symptoms  9/2/2019 1005 by Laxmi Osborn RN  Outcome: Ongoing  9/2/2019 0114 by Félix Ellis RN  Outcome: Ongoing  Goal: Absence of new skin breakdown  Description  Absence of new skin breakdown  9/2/2019 1005 by Laxmi Osborn RN  Outcome: Ongoing  9/2/2019 0114 by Félix Ellis RN  Outcome: Ongoing     Problem: Sleep Pattern Disturbance:  Goal: Appears well-rested  Description  Appears well-rested  9/2/2019 1005 by Laxmi Osborn RN  Outcome: Ongoing  9/2/2019 0114 by Félix Ellis RN  Outcome: Ongoing     Problem: Infection:  Goal: Will remain free from infection  Description  Will remain free from infection  9/2/2019 0114 by Félix Ellis RN  Outcome: Ongoing     Problem: Safety:  Goal: Free from accidental physical injury  Description  Free from accidental physical injury  9/2/2019 0114 by Félix Ellis RN  Outcome: Ongoing  Goal: Free from intentional harm  Description  Free from intentional harm  9/2/2019 0114 by Félix Ellis RN  Outcome: Ongoing     Problem: Daily Care:  Goal: Daily care needs are met  Description  Daily care needs are met  9/2/2019 0114 by Félix Ellis RN  Outcome: Ongoing     Problem: Skin Integrity:  Goal: Skin integrity will stabilize  Description  Skin integrity will stabilize  9/2/2019 0114 by Félix Ellis RN  Outcome: Ongoing     Problem: Discharge Planning:  Goal: Patients continuum of care needs are met  Description  Patients continuum of care needs are met  9/2/2019 0114 by Félix Ellis RN  Outcome: Ongoing

## 2019-09-02 NOTE — PLAN OF CARE
by Gunner Chen RN  Outcome: Ongoing  Goal: Electrolytes within specified parameters  Description  Electrolytes within specified parameters  9/2/2019 0114 by Nando Noble RN  Outcome: Ongoing  9/1/2019 1402 by Gunner Chen RN  Outcome: Ongoing     Problem: Mental Status - Impaired:  Goal: Absence of physical injury  Description  Absence of physical injury  9/2/2019 0114 by Nando Noble RN  Outcome: Ongoing  9/1/2019 1402 by Gunner Chen RN  Outcome: Ongoing  Goal: Absence of continued neurological deterioration signs and symptoms  Description  Absence of continued neurological deterioration signs and symptoms  9/2/2019 0114 by Nando Noble RN  Outcome: Ongoing  9/1/2019 1402 by Gunner Chen RN  Outcome: Ongoing  Goal: Mental status will be restored to baseline  Description  Mental status will be restored to baseline  9/2/2019 0114 by Nando Noble RN  Outcome: Ongoing  9/1/2019 1402 by Gunner Chen RN  Outcome: Ongoing     Problem: Mobility - Impaired:  Goal: Mobility will improve to maximum level  Description  Mobility will improve to maximum level  9/2/2019 0114 by Nando Noble RN  Outcome: Ongoing  9/1/2019 1402 by Gunner Chen RN  Outcome: Ongoing     Problem: Nutrition Deficit:  Goal: Ability to achieve adequate nutritional intake will improve  Description  Ability to achieve adequate nutritional intake will improve  9/2/2019 0114 by Nando Noble RN  Outcome: Ongoing  9/1/2019 1402 by Gunner Chen RN  Outcome: Ongoing     Problem: Pain:  Goal: Pain level will decrease  Description  Pain level will decrease  9/2/2019 0114 by Nando Noble RN  Outcome: Ongoing  9/1/2019 1402 by Gunner Chen RN  Outcome: Ongoing  Goal: Ability to notify healthcare provider of pain before it becomes unmanageable or unbearable will improve  Description  Ability to notify healthcare provider of pain before it becomes unmanageable or unbearable will improve  9/2/2019 0114 by Nando Noble RN  Outcome: Ongoing  9/1/2019 1402 by Sanam Aviles RN  Outcome: Ongoing  Goal: Control of acute pain  Description  Control of acute pain  9/2/2019 0114 by Vijay Leija RN  Outcome: Ongoing  9/1/2019 1402 by Sanam Aviles RN  Outcome: Ongoing  Goal: Control of chronic pain  Description  Control of chronic pain  9/2/2019 0114 by Vijay Leija RN  Outcome: Ongoing  9/1/2019 1402 by Sanam Aviles RN  Outcome: Ongoing     Problem: Skin Integrity - Impaired:  Goal: Will show no infection signs and symptoms  Description  Will show no infection signs and symptoms  9/2/2019 0114 by Vijay Leija RN  Outcome: Ongoing  9/1/2019 1402 by Sanam Aviles RN  Outcome: Ongoing  Goal: Absence of new skin breakdown  Description  Absence of new skin breakdown  9/2/2019 0114 by Vijay Leija RN  Outcome: Ongoing  9/1/2019 1402 by Sanam Aviles RN  Outcome: Ongoing     Problem: Sleep Pattern Disturbance:  Goal: Appears well-rested  Description  Appears well-rested  9/2/2019 0114 by Vijay Leija RN  Outcome: Ongoing  9/1/2019 1402 by Sanam Aviles RN  Outcome: Ongoing     Problem: Infection:  Goal: Will remain free from infection  Description  Will remain free from infection  Outcome: Ongoing     Problem: Safety:  Goal: Free from accidental physical injury  Description  Free from accidental physical injury  Outcome: Ongoing  Goal: Free from intentional harm  Description  Free from intentional harm  Outcome: Ongoing     Problem: Daily Care:  Goal: Daily care needs are met  Description  Daily care needs are met  Outcome: Ongoing     Problem: Skin Integrity:  Goal: Skin integrity will stabilize  Description  Skin integrity will stabilize  Outcome: Ongoing     Problem: Discharge Planning:  Goal: Patients continuum of care needs are met  Description  Patients continuum of care needs are met  Outcome: Ongoing

## 2019-09-02 NOTE — PROGRESS NOTES
Dr. Ana Maher at bedside, dressing reinforced per md. David Quiroz to floor.
Vascular Surgery Rounding Note    8/31/2019  9:22 AM  Antibiotic day 2, Antibiotic Type vancomycin    Post op day - 1 Left BKA    Subjective- Has some phantom pain    Objective-   Invalid input(s): 24H    Intake/Output Summary (Last 24 hours) at 8/31/2019 0922  Last data filed at 8/31/2019 0630  Gross per 24 hour   Intake 3333.33 ml   Output 1950 ml   Net 1383.33 ml     No intake/output data recorded. CBC:   Recent Labs     08/30/19  1432 08/31/19  0414   WBC 12.6* 11.5*   RBC 4.02* 3.55*   HGB 12.7* 11.2*   HCT 37.4* 32.4*   MCV 93.0 91.3   MCH 31.6* 31.5*   MCHC 34.0 34.6   RDW 12.4 12.3    226   MPV 9.9 10.2      Last 3 CMP:   Recent Labs     08/31/19  0414      K 4.6      CO2 22   BUN 19   CREATININE 0.9   GLUCOSE 292*   CALCIUM 8.0*             Physical Exam:  Awake, alert, appropriate Yes  BP (!) 153/83   Pulse 99   Temp 97.3 °F (36.3 °C) (Oral)   Resp 18   Ht 6' 3\" (1.905 m)   Wt (!) 378 lb (171.5 kg)   SpO2 93%   BMI 47.25 kg/m²   Heart-Normal S1 and S2.  Regular rhythm. No murmurs, gallops, or rubs. Lung-negative  Neck-suppleno adenopathy, no carotid bruit, no JVD, supple, symmetrical, trachea midline and thyroid not enlarged, symmetric, no tenderness/mass/nodules  Abdomen-Abdomen soft, non-tender. BS normal. No masses,  No organomegaly  Extremities-Right heal wound virtually healed. Neurologic- alert, oriented, normal speech, no focal findings or movement disorder noted  Wound surgical incisions-well approximated incision    Took down initial Coflex, wound and flaps look excellent, re-applied Coflex  Assessment/Plan  1. POD-1 doing well  2. PT/OT referral  3. Rehab referral      Antibiotics continued after surgery due to:   Infection -  []  Abscess    []  Pneumonia    []  Aspiration Pneumonia    []  Sepsis    []  Cellulitis    []  Positive culture    []  UTI    [x]  Osteomyelitis    []  Fungal infection    []  H.pylori                              DVT prophylaxis:
amputee  []  bilateral lower extremity trauma        []  patient refusal        []  continuing IV heparin         No pharmacologic prophylaxis due to active bleeding        []  bleeding risk        []  GI bleed        []  hemorrhage        []  patient refusal        []  risk of bleeding         []  thrombocytopenia        []  supratherapeutic INR    Beta Blocker:  not indicated    Jimenez Catheter:  n/a
lispro  0-6 Units Subcutaneous Nightly    acetaminophen  650 mg Oral Q4H    ketorolac  30 mg Intravenous Q6H    amitriptyline  10 mg Oral Nightly     Continuous Infusions:   sodium chloride 125 mL/hr (08/30/19 1550)    dextrose       PRN Meds:  sodium chloride flush 10 mL PRN   acetaminophen 650 mg Q4H PRN   ondansetron 4 mg Q6H PRN   glucose 15 g PRN   dextrose 12.5 g PRN   glucagon (rDNA) 1 mg PRN   dextrose 100 mL/hr PRN   metoprolol tartrate 25 mg Q12H PRN   cloNIDine 0.1 mg Q2H PRN   morphine 2 mg Q1H PRN         PHYSICAL EXAM:     CONSTITUTIONAL: Alert and oriented times 3, no acute distress and cooperative to examination. HEENT: Head is normocephalic, atraumatic. EOMI, PERRLA  NECK: Soft, trachea midline and straight  LUNGS: Chest expands equally bilaterally upon respiration, no accessory muscle used. Ausculation reveals no wheezes, rales or rhonchi. CARDIOVASCULAR: Heart sounds are normal.  Regular rate and rhythm without murmur, gallop or rub. ABDOMEN: soft, nontender, nondistended, no masses or organomegaly  NEUROLOGIC: CN II-XII are grossly intact. There are no focalizing motor or sensory deficits  WOUND/INCISION:  Applied stump protection      LABS:       CBC: Recent Labs     08/30/19  1432 08/31/19  0414   WBC 12.6* 11.5*   RBC 4.02* 3.55*   HGB 12.7* 11.2*   HCT 37.4* 32.4*   MCV 93.0 91.3   MCH 31.6* 31.5*   MCHC 34.0 34.6   RDW 12.4 12.3    226   MPV 9.9 10.2      Last 3 CMP:   Recent Labs     08/31/19  0414      K 4.6      CO2 22   BUN 19   CREATININE 0.9   GLUCOSE 292*   CALCIUM 8.0*      Troponin: No results for input(s): TROPONINI in the last 72 hours. Calcium:   Lab Results   Component Value Date    CALCIUM 8.0 08/31/2019    CALCIUM 9.5 08/27/2019    CALCIUM 9.0 07/15/2019      Ionized Calcium: No results found for: IONCA   Lipids: No results for input(s): CHOL, HDL in the last 72 hours.     Invalid input(s): LDLCALCU  INR: No results for input(s): INR in the last 72

## 2019-09-03 ENCOUNTER — ANESTHESIA EVENT (OUTPATIENT)
Dept: OPERATING ROOM | Age: 39
DRG: 617 | End: 2019-09-03
Payer: COMMERCIAL

## 2019-09-03 ENCOUNTER — ANESTHESIA (OUTPATIENT)
Dept: OPERATING ROOM | Age: 39
DRG: 617 | End: 2019-09-03
Payer: COMMERCIAL

## 2019-09-03 VITALS
RESPIRATION RATE: 17 BRPM | OXYGEN SATURATION: 99 % | SYSTOLIC BLOOD PRESSURE: 109 MMHG | DIASTOLIC BLOOD PRESSURE: 50 MMHG

## 2019-09-03 PROBLEM — Z89.511 STATUS POST BELOW KNEE AMPUTATION OF RIGHT LOWER EXTREMITY (HCC): Status: ACTIVE | Noted: 2019-09-03

## 2019-09-03 PROBLEM — I97.89 POSTOPERATIVE SURGICAL COMPLICATION INVOLVING CIRCULATORY SYSTEM: Status: ACTIVE | Noted: 2019-09-03

## 2019-09-03 LAB
ABO/RH: NORMAL
ANION GAP SERPL CALCULATED.3IONS-SCNC: 13 MMOL/L (ref 7–19)
ANTIBODY SCREEN: NORMAL
APTT: 27.8 SEC (ref 26–36.2)
BASOPHILS ABSOLUTE: 0 K/UL (ref 0–0.2)
BASOPHILS RELATIVE PERCENT: 0.4 % (ref 0–1)
BUN BLDV-MCNC: 11 MG/DL (ref 6–20)
CALCIUM SERPL-MCNC: 8.7 MG/DL (ref 8.6–10)
CHLORIDE BLD-SCNC: 101 MMOL/L (ref 98–111)
CO2: 26 MMOL/L (ref 22–29)
CREAT SERPL-MCNC: 0.8 MG/DL (ref 0.5–1.2)
EOSINOPHILS ABSOLUTE: 0.2 K/UL (ref 0–0.6)
EOSINOPHILS RELATIVE PERCENT: 2.7 % (ref 0–5)
GFR NON-AFRICAN AMERICAN: >60
GLUCOSE BLD-MCNC: 211 MG/DL (ref 70–99)
GLUCOSE BLD-MCNC: 216 MG/DL (ref 70–99)
GLUCOSE BLD-MCNC: 218 MG/DL (ref 74–109)
GLUCOSE BLD-MCNC: 307 MG/DL (ref 70–99)
GLUCOSE BLD-MCNC: 353 MG/DL (ref 70–99)
HCT VFR BLD CALC: 28.2 % (ref 42–52)
HCT VFR BLD CALC: 29.9 % (ref 42–52)
HEMOGLOBIN: 10.1 G/DL (ref 14–18)
HEMOGLOBIN: 9.7 G/DL (ref 14–18)
IMMATURE GRANULOCYTES #: 0 K/UL
INR BLD: 1.04 (ref 0.88–1.18)
LYMPHOCYTES ABSOLUTE: 2.2 K/UL (ref 1.1–4.5)
LYMPHOCYTES RELATIVE PERCENT: 29.3 % (ref 20–40)
MCH RBC QN AUTO: 31 PG (ref 27–31)
MCHC RBC AUTO-ENTMCNC: 33.8 G/DL (ref 33–37)
MCV RBC AUTO: 91.7 FL (ref 80–94)
MONOCYTES ABSOLUTE: 0.5 K/UL (ref 0–0.9)
MONOCYTES RELATIVE PERCENT: 6.4 % (ref 0–10)
NEUTROPHILS ABSOLUTE: 4.7 K/UL (ref 1.5–7.5)
NEUTROPHILS RELATIVE PERCENT: 60.9 % (ref 50–65)
PDW BLD-RTO: 12.6 % (ref 11.5–14.5)
PERFORMED ON: ABNORMAL
PLATELET # BLD: 195 K/UL (ref 130–400)
PMV BLD AUTO: 9.9 FL (ref 9.4–12.4)
POTASSIUM REFLEX MAGNESIUM: 4.1 MMOL/L (ref 3.5–5)
PROTHROMBIN TIME: 13 SEC (ref 12–14.6)
RBC # BLD: 3.26 M/UL (ref 4.7–6.1)
SODIUM BLD-SCNC: 140 MMOL/L (ref 136–145)
WBC # BLD: 7.7 K/UL (ref 4.8–10.8)

## 2019-09-03 PROCEDURE — 85730 THROMBOPLASTIN TIME PARTIAL: CPT

## 2019-09-03 PROCEDURE — 80048 BASIC METABOLIC PNL TOTAL CA: CPT

## 2019-09-03 PROCEDURE — 6360000002 HC RX W HCPCS: Performed by: SURGERY

## 2019-09-03 PROCEDURE — 27301 DRAIN THIGH/KNEE LESION: CPT | Performed by: SURGERY

## 2019-09-03 PROCEDURE — 86850 RBC ANTIBODY SCREEN: CPT

## 2019-09-03 PROCEDURE — 7100000001 HC PACU RECOVERY - ADDTL 15 MIN: Performed by: SURGERY

## 2019-09-03 PROCEDURE — 2500000003 HC RX 250 WO HCPCS: Performed by: SURGERY

## 2019-09-03 PROCEDURE — 3600000004 HC SURGERY LEVEL 4 BASE: Performed by: SURGERY

## 2019-09-03 PROCEDURE — 3700000001 HC ADD 15 MINUTES (ANESTHESIA): Performed by: SURGERY

## 2019-09-03 PROCEDURE — 6370000000 HC RX 637 (ALT 250 FOR IP): Performed by: ANESTHESIOLOGY

## 2019-09-03 PROCEDURE — 2500000003 HC RX 250 WO HCPCS: Performed by: NURSE ANESTHETIST, CERTIFIED REGISTERED

## 2019-09-03 PROCEDURE — 6370000000 HC RX 637 (ALT 250 FOR IP): Performed by: SURGERY

## 2019-09-03 PROCEDURE — 85018 HEMOGLOBIN: CPT

## 2019-09-03 PROCEDURE — 2580000003 HC RX 258: Performed by: SURGERY

## 2019-09-03 PROCEDURE — 85610 PROTHROMBIN TIME: CPT

## 2019-09-03 PROCEDURE — 7100000000 HC PACU RECOVERY - FIRST 15 MIN: Performed by: SURGERY

## 2019-09-03 PROCEDURE — 85025 COMPLETE CBC W/AUTO DIFF WBC: CPT

## 2019-09-03 PROCEDURE — 2580000003 HC RX 258: Performed by: NURSE ANESTHETIST, CERTIFIED REGISTERED

## 2019-09-03 PROCEDURE — 85014 HEMATOCRIT: CPT

## 2019-09-03 PROCEDURE — 6360000002 HC RX W HCPCS: Performed by: NURSE ANESTHETIST, CERTIFIED REGISTERED

## 2019-09-03 PROCEDURE — 93971 EXTREMITY STUDY: CPT

## 2019-09-03 PROCEDURE — 87070 CULTURE OTHR SPECIMN AEROBIC: CPT

## 2019-09-03 PROCEDURE — 36415 COLL VENOUS BLD VENIPUNCTURE: CPT

## 2019-09-03 PROCEDURE — 0JCP0ZZ EXTIRPATION OF MATTER FROM LEFT LOWER LEG SUBCUTANEOUS TISSUE AND FASCIA, OPEN APPROACH: ICD-10-PCS | Performed by: SURGERY

## 2019-09-03 PROCEDURE — 99024 POSTOP FOLLOW-UP VISIT: CPT | Performed by: SURGERY

## 2019-09-03 PROCEDURE — 87075 CULTR BACTERIA EXCEPT BLOOD: CPT

## 2019-09-03 PROCEDURE — 86901 BLOOD TYPING SEROLOGIC RH(D): CPT

## 2019-09-03 PROCEDURE — 3600000014 HC SURGERY LEVEL 4 ADDTL 15MIN: Performed by: SURGERY

## 2019-09-03 PROCEDURE — 2580000003 HC RX 258: Performed by: ANESTHESIOLOGY

## 2019-09-03 PROCEDURE — 86900 BLOOD TYPING SEROLOGIC ABO: CPT

## 2019-09-03 PROCEDURE — 1210000000 HC MED SURG R&B

## 2019-09-03 PROCEDURE — 82948 REAGENT STRIP/BLOOD GLUCOSE: CPT

## 2019-09-03 PROCEDURE — 87205 SMEAR GRAM STAIN: CPT

## 2019-09-03 PROCEDURE — 3700000000 HC ANESTHESIA ATTENDED CARE: Performed by: SURGERY

## 2019-09-03 PROCEDURE — 6360000002 HC RX W HCPCS: Performed by: ANESTHESIOLOGY

## 2019-09-03 PROCEDURE — 2709999900 HC NON-CHARGEABLE SUPPLY: Performed by: SURGERY

## 2019-09-03 RX ORDER — LIDOCAINE HYDROCHLORIDE 10 MG/ML
INJECTION, SOLUTION EPIDURAL; INFILTRATION; INTRACAUDAL; PERINEURAL PRN
Status: DISCONTINUED | OUTPATIENT
Start: 2019-09-03 | End: 2019-09-03 | Stop reason: SDUPTHER

## 2019-09-03 RX ORDER — MEPERIDINE HYDROCHLORIDE 50 MG/ML
12.5 INJECTION INTRAMUSCULAR; INTRAVENOUS; SUBCUTANEOUS EVERY 5 MIN PRN
Status: DISCONTINUED | OUTPATIENT
Start: 2019-09-03 | End: 2019-09-03 | Stop reason: HOSPADM

## 2019-09-03 RX ORDER — FENTANYL CITRATE 50 UG/ML
INJECTION, SOLUTION INTRAMUSCULAR; INTRAVENOUS PRN
Status: DISCONTINUED | OUTPATIENT
Start: 2019-09-03 | End: 2019-09-03 | Stop reason: SDUPTHER

## 2019-09-03 RX ORDER — ONDANSETRON 2 MG/ML
INJECTION INTRAMUSCULAR; INTRAVENOUS PRN
Status: DISCONTINUED | OUTPATIENT
Start: 2019-09-03 | End: 2019-09-03 | Stop reason: SDUPTHER

## 2019-09-03 RX ORDER — MIDAZOLAM HYDROCHLORIDE 1 MG/ML
2 INJECTION INTRAMUSCULAR; INTRAVENOUS
Status: DISCONTINUED | OUTPATIENT
Start: 2019-09-03 | End: 2019-09-03 | Stop reason: HOSPADM

## 2019-09-03 RX ORDER — HYDROCODONE BITARTRATE AND ACETAMINOPHEN 5; 325 MG/1; MG/1
2 TABLET ORAL EVERY 4 HOURS PRN
Status: DISCONTINUED | OUTPATIENT
Start: 2019-09-03 | End: 2019-09-04 | Stop reason: HOSPADM

## 2019-09-03 RX ORDER — ACETAMINOPHEN 325 MG/1
650 TABLET ORAL EVERY 4 HOURS PRN
Status: DISCONTINUED | OUTPATIENT
Start: 2019-09-03 | End: 2019-09-04 | Stop reason: HOSPADM

## 2019-09-03 RX ORDER — FENTANYL CITRATE 50 UG/ML
50 INJECTION, SOLUTION INTRAMUSCULAR; INTRAVENOUS
Status: DISCONTINUED | OUTPATIENT
Start: 2019-09-03 | End: 2019-09-03 | Stop reason: HOSPADM

## 2019-09-03 RX ORDER — SODIUM CHLORIDE, SODIUM LACTATE, POTASSIUM CHLORIDE, CALCIUM CHLORIDE 600; 310; 30; 20 MG/100ML; MG/100ML; MG/100ML; MG/100ML
INJECTION, SOLUTION INTRAVENOUS CONTINUOUS
Status: DISCONTINUED | OUTPATIENT
Start: 2019-09-03 | End: 2019-09-03

## 2019-09-03 RX ORDER — MORPHINE SULFATE 2 MG/ML
2 INJECTION, SOLUTION INTRAMUSCULAR; INTRAVENOUS EVERY 5 MIN PRN
Status: DISCONTINUED | OUTPATIENT
Start: 2019-09-03 | End: 2019-09-03 | Stop reason: HOSPADM

## 2019-09-03 RX ORDER — SODIUM CHLORIDE 9 MG/ML
INJECTION, SOLUTION INTRAVENOUS CONTINUOUS
Status: DISCONTINUED | OUTPATIENT
Start: 2019-09-03 | End: 2019-09-04 | Stop reason: HOSPADM

## 2019-09-03 RX ORDER — MIDAZOLAM HYDROCHLORIDE 1 MG/ML
INJECTION INTRAMUSCULAR; INTRAVENOUS PRN
Status: DISCONTINUED | OUTPATIENT
Start: 2019-09-03 | End: 2019-09-03 | Stop reason: SDUPTHER

## 2019-09-03 RX ORDER — SODIUM CHLORIDE, SODIUM LACTATE, POTASSIUM CHLORIDE, CALCIUM CHLORIDE 600; 310; 30; 20 MG/100ML; MG/100ML; MG/100ML; MG/100ML
INJECTION, SOLUTION INTRAVENOUS CONTINUOUS PRN
Status: DISCONTINUED | OUTPATIENT
Start: 2019-09-03 | End: 2019-09-03 | Stop reason: SDUPTHER

## 2019-09-03 RX ORDER — LIDOCAINE HYDROCHLORIDE 10 MG/ML
1 INJECTION, SOLUTION EPIDURAL; INFILTRATION; INTRACAUDAL; PERINEURAL
Status: DISCONTINUED | OUTPATIENT
Start: 2019-09-03 | End: 2019-09-03 | Stop reason: HOSPADM

## 2019-09-03 RX ORDER — MORPHINE SULFATE 2 MG/ML
4 INJECTION, SOLUTION INTRAMUSCULAR; INTRAVENOUS EVERY 5 MIN PRN
Status: DISCONTINUED | OUTPATIENT
Start: 2019-09-03 | End: 2019-09-03 | Stop reason: HOSPADM

## 2019-09-03 RX ORDER — SODIUM CHLORIDE 0.9 % (FLUSH) 0.9 %
10 SYRINGE (ML) INJECTION PRN
Status: DISCONTINUED | OUTPATIENT
Start: 2019-09-03 | End: 2019-09-03 | Stop reason: HOSPADM

## 2019-09-03 RX ORDER — SCOLOPAMINE TRANSDERMAL SYSTEM 1 MG/1
1 PATCH, EXTENDED RELEASE TRANSDERMAL ONCE
Status: DISCONTINUED | OUTPATIENT
Start: 2019-09-03 | End: 2019-09-03

## 2019-09-03 RX ORDER — PROPOFOL 10 MG/ML
INJECTION, EMULSION INTRAVENOUS PRN
Status: DISCONTINUED | OUTPATIENT
Start: 2019-09-03 | End: 2019-09-03 | Stop reason: SDUPTHER

## 2019-09-03 RX ORDER — SODIUM CHLORIDE 0.9 % (FLUSH) 0.9 %
10 SYRINGE (ML) INJECTION EVERY 12 HOURS SCHEDULED
Status: DISCONTINUED | OUTPATIENT
Start: 2019-09-03 | End: 2019-09-03 | Stop reason: HOSPADM

## 2019-09-03 RX ORDER — MORPHINE SULFATE 4 MG/ML
4 INJECTION, SOLUTION INTRAMUSCULAR; INTRAVENOUS
Status: DISCONTINUED | OUTPATIENT
Start: 2019-09-03 | End: 2019-09-03 | Stop reason: HOSPADM

## 2019-09-03 RX ORDER — DIPHENHYDRAMINE HYDROCHLORIDE 50 MG/ML
12.5 INJECTION INTRAMUSCULAR; INTRAVENOUS
Status: DISCONTINUED | OUTPATIENT
Start: 2019-09-03 | End: 2019-09-03 | Stop reason: HOSPADM

## 2019-09-03 RX ORDER — DEXAMETHASONE SODIUM PHOSPHATE 10 MG/ML
INJECTION INTRAMUSCULAR; INTRAVENOUS PRN
Status: DISCONTINUED | OUTPATIENT
Start: 2019-09-03 | End: 2019-09-03 | Stop reason: SDUPTHER

## 2019-09-03 RX ORDER — SUCCINYLCHOLINE CHLORIDE 20 MG/ML
INJECTION INTRAMUSCULAR; INTRAVENOUS PRN
Status: DISCONTINUED | OUTPATIENT
Start: 2019-09-03 | End: 2019-09-03 | Stop reason: SDUPTHER

## 2019-09-03 RX ORDER — ROCURONIUM BROMIDE 10 MG/ML
INJECTION, SOLUTION INTRAVENOUS PRN
Status: DISCONTINUED | OUTPATIENT
Start: 2019-09-03 | End: 2019-09-03 | Stop reason: SDUPTHER

## 2019-09-03 RX ORDER — LABETALOL 20 MG/4 ML (5 MG/ML) INTRAVENOUS SYRINGE
5 EVERY 10 MIN PRN
Status: DISCONTINUED | OUTPATIENT
Start: 2019-09-03 | End: 2019-09-03 | Stop reason: HOSPADM

## 2019-09-03 RX ORDER — PROMETHAZINE HYDROCHLORIDE 25 MG/ML
6.25 INJECTION, SOLUTION INTRAMUSCULAR; INTRAVENOUS
Status: DISCONTINUED | OUTPATIENT
Start: 2019-09-03 | End: 2019-09-03 | Stop reason: HOSPADM

## 2019-09-03 RX ORDER — ONDANSETRON 2 MG/ML
4 INJECTION INTRAMUSCULAR; INTRAVENOUS EVERY 6 HOURS PRN
Status: DISCONTINUED | OUTPATIENT
Start: 2019-09-03 | End: 2019-09-04 | Stop reason: HOSPADM

## 2019-09-03 RX ORDER — HYDRALAZINE HYDROCHLORIDE 20 MG/ML
5 INJECTION INTRAMUSCULAR; INTRAVENOUS EVERY 10 MIN PRN
Status: DISCONTINUED | OUTPATIENT
Start: 2019-09-03 | End: 2019-09-03 | Stop reason: HOSPADM

## 2019-09-03 RX ORDER — HYDROCODONE BITARTRATE AND ACETAMINOPHEN 5; 325 MG/1; MG/1
1 TABLET ORAL EVERY 4 HOURS PRN
Status: DISCONTINUED | OUTPATIENT
Start: 2019-09-03 | End: 2019-09-04 | Stop reason: HOSPADM

## 2019-09-03 RX ORDER — METOCLOPRAMIDE HYDROCHLORIDE 5 MG/ML
10 INJECTION INTRAMUSCULAR; INTRAVENOUS
Status: DISCONTINUED | OUTPATIENT
Start: 2019-09-03 | End: 2019-09-03 | Stop reason: HOSPADM

## 2019-09-03 RX ADMIN — ONDANSETRON HYDROCHLORIDE 4 MG: 2 INJECTION, SOLUTION INTRAMUSCULAR; INTRAVENOUS at 11:19

## 2019-09-03 RX ADMIN — INSULIN LISPRO 12 UNITS: 100 INJECTION, SOLUTION INTRAVENOUS; SUBCUTANEOUS at 13:27

## 2019-09-03 RX ADMIN — LOSARTAN POTASSIUM 50 MG: 50 TABLET ORAL at 21:04

## 2019-09-03 RX ADMIN — INSULIN LISPRO 2 UNITS: 100 INJECTION, SOLUTION INTRAVENOUS; SUBCUTANEOUS at 13:33

## 2019-09-03 RX ADMIN — MORPHINE SULFATE 2 MG: 2 INJECTION, SOLUTION INTRAMUSCULAR; INTRAVENOUS at 04:52

## 2019-09-03 RX ADMIN — BUSPIRONE HYDROCHLORIDE 5 MG: 5 TABLET ORAL at 09:14

## 2019-09-03 RX ADMIN — ROCURONIUM BROMIDE 5 MG: 10 INJECTION INTRAVENOUS at 10:52

## 2019-09-03 RX ADMIN — SODIUM CHLORIDE, SODIUM LACTATE, POTASSIUM CHLORIDE, AND CALCIUM CHLORIDE: 600; 310; 30; 20 INJECTION, SOLUTION INTRAVENOUS at 10:40

## 2019-09-03 RX ADMIN — HYDROCODONE BITARTRATE AND ACETAMINOPHEN 1 TABLET: 5; 325 TABLET ORAL at 13:27

## 2019-09-03 RX ADMIN — AMITRIPTYLINE HYDROCHLORIDE 10 MG: 10 TABLET, FILM COATED ORAL at 21:05

## 2019-09-03 RX ADMIN — INSULIN LISPRO 5 UNITS: 100 INJECTION, SOLUTION INTRAVENOUS; SUBCUTANEOUS at 21:05

## 2019-09-03 RX ADMIN — SUCCINYLCHOLINE CHLORIDE 160 MG: 20 INJECTION, SOLUTION INTRAMUSCULAR; INTRAVENOUS; PARENTERAL at 10:52

## 2019-09-03 RX ADMIN — Medication 30 UNITS: at 21:05

## 2019-09-03 RX ADMIN — LIDOCAINE HYDROCHLORIDE 50 MG: 10 INJECTION, SOLUTION EPIDURAL; INFILTRATION; INTRACAUDAL; PERINEURAL at 10:52

## 2019-09-03 RX ADMIN — BUSPIRONE HYDROCHLORIDE 5 MG: 5 TABLET ORAL at 13:27

## 2019-09-03 RX ADMIN — Medication 15 UNITS: at 09:14

## 2019-09-03 RX ADMIN — SODIUM CHLORIDE, SODIUM LACTATE, POTASSIUM CHLORIDE, AND CALCIUM CHLORIDE: 600; 310; 30; 20 INJECTION, SOLUTION INTRAVENOUS at 10:08

## 2019-09-03 RX ADMIN — MIDAZOLAM 2 MG: 1 INJECTION INTRAMUSCULAR; INTRAVENOUS at 10:37

## 2019-09-03 RX ADMIN — Medication 10 ML: at 21:05

## 2019-09-03 RX ADMIN — PREGABALIN 50 MG: 25 CAPSULE ORAL at 09:13

## 2019-09-03 RX ADMIN — VANCOMYCIN HYDROCHLORIDE 2000 MG: 10 INJECTION, POWDER, LYOPHILIZED, FOR SOLUTION INTRAVENOUS at 21:05

## 2019-09-03 RX ADMIN — BUSPIRONE HYDROCHLORIDE 5 MG: 5 TABLET ORAL at 21:04

## 2019-09-03 RX ADMIN — SODIUM CHLORIDE: 9 INJECTION, SOLUTION INTRAVENOUS at 13:27

## 2019-09-03 RX ADMIN — INSULIN LISPRO 12 UNITS: 100 INJECTION, SOLUTION INTRAVENOUS; SUBCUTANEOUS at 17:17

## 2019-09-03 RX ADMIN — PREGABALIN 50 MG: 25 CAPSULE ORAL at 21:04

## 2019-09-03 RX ADMIN — INSULIN LISPRO 8 UNITS: 100 INJECTION, SOLUTION INTRAVENOUS; SUBCUTANEOUS at 17:15

## 2019-09-03 RX ADMIN — PROPOFOL 200 MG: 10 INJECTION, EMULSION INTRAVENOUS at 10:52

## 2019-09-03 RX ADMIN — FENTANYL CITRATE 50 MCG: 50 INJECTION INTRAMUSCULAR; INTRAVENOUS at 11:04

## 2019-09-03 RX ADMIN — LOSARTAN POTASSIUM 50 MG: 50 TABLET ORAL at 09:14

## 2019-09-03 RX ADMIN — MORPHINE SULFATE 4 MG: 2 INJECTION, SOLUTION INTRAMUSCULAR; INTRAVENOUS at 10:06

## 2019-09-03 RX ADMIN — DEXAMETHASONE SODIUM PHOSPHATE 10 MG: 10 INJECTION INTRAMUSCULAR; INTRAVENOUS at 11:03

## 2019-09-03 RX ADMIN — HYDROCODONE BITARTRATE AND ACETAMINOPHEN 2 TABLET: 5; 325 TABLET ORAL at 21:04

## 2019-09-03 RX ADMIN — FENTANYL CITRATE 100 MCG: 50 INJECTION INTRAMUSCULAR; INTRAVENOUS at 10:52

## 2019-09-03 ASSESSMENT — PAIN DESCRIPTION - FREQUENCY: FREQUENCY: CONTINUOUS

## 2019-09-03 ASSESSMENT — LIFESTYLE VARIABLES: SMOKING_STATUS: 0

## 2019-09-03 ASSESSMENT — PAIN DESCRIPTION - LOCATION: LOCATION: KNEE

## 2019-09-03 ASSESSMENT — PAIN DESCRIPTION - DESCRIPTORS: DESCRIPTORS: ACHING;BURNING

## 2019-09-03 ASSESSMENT — PAIN - FUNCTIONAL ASSESSMENT: PAIN_FUNCTIONAL_ASSESSMENT: PREVENTS OR INTERFERES SOME ACTIVE ACTIVITIES AND ADLS

## 2019-09-03 ASSESSMENT — PAIN SCALES - GENERAL
PAINLEVEL_OUTOF10: 1
PAINLEVEL_OUTOF10: 0
PAINLEVEL_OUTOF10: 7
PAINLEVEL_OUTOF10: 1
PAINLEVEL_OUTOF10: 0
PAINLEVEL_OUTOF10: 0
PAINLEVEL_OUTOF10: 4
PAINLEVEL_OUTOF10: 7
PAINLEVEL_OUTOF10: 9
PAINLEVEL_OUTOF10: 0

## 2019-09-03 ASSESSMENT — PAIN DESCRIPTION - PROGRESSION: CLINICAL_PROGRESSION: NOT CHANGED

## 2019-09-03 ASSESSMENT — PAIN DESCRIPTION - PAIN TYPE: TYPE: SURGICAL PAIN

## 2019-09-03 ASSESSMENT — PAIN DESCRIPTION - ORIENTATION: ORIENTATION: LEFT

## 2019-09-03 ASSESSMENT — ENCOUNTER SYMPTOMS: SHORTNESS OF BREATH: 0

## 2019-09-03 ASSESSMENT — PAIN DESCRIPTION - ONSET: ONSET: ON-GOING

## 2019-09-03 NOTE — ANESTHESIA PRE PROCEDURE
[place]-esophageal stricture-Faith (-) chronic inflammation    TOE AMPUTATION Left 1/31/2019    TJR. Left second toe amputation at the metatarsophalangeal.    TOE AMPUTATION Left 7/11/2019    AMPUTATION LEFT FIFITH TOE performed by Teresa Boudreaux MD at [facility]         Social History:    Social History     Tobacco Use    Smoking status: Never Smoker    Smokeless tobacco: Never Used   Substance Use Topics    Alcohol use: No                                Counseling given: Not Answered      Vital Signs (Current): There were no vitals filed for this visit.                                            BP Readings from Last 3 Encounters:   09/03/19 (!) 152/99   08/30/19 (!) 101/51   08/27/19 (!) 145/90       NPO Status:                                                                                 BMI:   Wt Readings from Last 3 Encounters:   08/30/19 (!) 378 lb (171.5 kg)   08/27/19 (!) 378 lb (171.5 kg)   08/21/19 (!) 350 lb (158.8 kg)     There is no height or weight on file to calculate BMI.    CBC:   Lab Results   Component Value Date    WBC 7.7 09/03/2019    RBC 3.26 09/03/2019    HGB 10.1 09/03/2019    HCT 29.9 09/03/2019    MCV 91.7 09/03/2019    RDW 12.6 09/03/2019     09/03/2019       CMP:   Lab Results   Component Value Date     09/03/2019    K 4.1 09/03/2019     09/03/2019    CO2 26 09/03/2019    BUN 11 09/03/2019    CREATININE 0.8 09/03/2019    LABGLOM >60 09/03/2019    GLUCOSE 218 09/03/2019    PROT 6.7 07/12/2019    CALCIUM 8.7 09/03/2019    BILITOT 0.3 07/12/2019    ALKPHOS 59 07/12/2019    AST 9 07/12/2019    ALT 13 07/12/2019       POC Tests:   Recent Labs     09/03/19  0705   POCGLU 216*       Coags:   Lab Results   Component Value Date    PROTIME 13.0 09/03/2019    INR 1.04 09/03/2019    APTT 27.8 09/03/2019       HCG (If Applicable): No results found for: PREGTESTUR, PREGSERUM, HCG, HCGQUANT     ABGs: No results found for: PHART, PO2ART,

## 2019-09-04 VITALS
OXYGEN SATURATION: 99 % | HEIGHT: 75 IN | BODY MASS INDEX: 39.17 KG/M2 | SYSTOLIC BLOOD PRESSURE: 168 MMHG | WEIGHT: 315 LBS | HEART RATE: 80 BPM | TEMPERATURE: 97.1 F | RESPIRATION RATE: 18 BRPM | DIASTOLIC BLOOD PRESSURE: 91 MMHG

## 2019-09-04 LAB
ANION GAP SERPL CALCULATED.3IONS-SCNC: 14 MMOL/L (ref 7–19)
BUN BLDV-MCNC: 17 MG/DL (ref 6–20)
CALCIUM SERPL-MCNC: 8.5 MG/DL (ref 8.6–10)
CHLORIDE BLD-SCNC: 101 MMOL/L (ref 98–111)
CO2: 25 MMOL/L (ref 22–29)
CREAT SERPL-MCNC: 0.8 MG/DL (ref 0.5–1.2)
GFR NON-AFRICAN AMERICAN: >60
GLUCOSE BLD-MCNC: 280 MG/DL (ref 70–99)
GLUCOSE BLD-MCNC: 302 MG/DL (ref 70–99)
GLUCOSE BLD-MCNC: 307 MG/DL (ref 74–109)
HCT VFR BLD CALC: 28.8 % (ref 42–52)
HEMOGLOBIN: 9.7 G/DL (ref 14–18)
MCH RBC QN AUTO: 31.1 PG (ref 27–31)
MCHC RBC AUTO-ENTMCNC: 33.7 G/DL (ref 33–37)
MCV RBC AUTO: 92.3 FL (ref 80–94)
PDW BLD-RTO: 12.8 % (ref 11.5–14.5)
PERFORMED ON: ABNORMAL
PERFORMED ON: ABNORMAL
PLATELET # BLD: 203 K/UL (ref 130–400)
PMV BLD AUTO: 9.9 FL (ref 9.4–12.4)
POTASSIUM REFLEX MAGNESIUM: 4.6 MMOL/L (ref 3.5–5)
RBC # BLD: 3.12 M/UL (ref 4.7–6.1)
SODIUM BLD-SCNC: 140 MMOL/L (ref 136–145)
WBC # BLD: 8.8 K/UL (ref 4.8–10.8)

## 2019-09-04 PROCEDURE — 80048 BASIC METABOLIC PNL TOTAL CA: CPT

## 2019-09-04 PROCEDURE — 99024 POSTOP FOLLOW-UP VISIT: CPT | Performed by: NURSE PRACTITIONER

## 2019-09-04 PROCEDURE — 99024 POSTOP FOLLOW-UP VISIT: CPT | Performed by: SURGERY

## 2019-09-04 PROCEDURE — 85027 COMPLETE CBC AUTOMATED: CPT

## 2019-09-04 PROCEDURE — 82948 REAGENT STRIP/BLOOD GLUCOSE: CPT

## 2019-09-04 PROCEDURE — 36415 COLL VENOUS BLD VENIPUNCTURE: CPT

## 2019-09-04 PROCEDURE — 2580000003 HC RX 258: Performed by: SURGERY

## 2019-09-04 PROCEDURE — 6370000000 HC RX 637 (ALT 250 FOR IP): Performed by: SURGERY

## 2019-09-04 RX ORDER — HYDROCODONE BITARTRATE AND ACETAMINOPHEN 5; 325 MG/1; MG/1
1 TABLET ORAL EVERY 4 HOURS PRN
Qty: 15 TABLET | Refills: 0 | Status: SHIPPED | OUTPATIENT
Start: 2019-09-04 | End: 2019-09-07

## 2019-09-04 RX ADMIN — PREGABALIN 50 MG: 25 CAPSULE ORAL at 08:04

## 2019-09-04 RX ADMIN — Medication 30 UNITS: at 08:04

## 2019-09-04 RX ADMIN — LOSARTAN POTASSIUM 50 MG: 50 TABLET ORAL at 08:04

## 2019-09-04 RX ADMIN — INSULIN LISPRO 6 UNITS: 100 INJECTION, SOLUTION INTRAVENOUS; SUBCUTANEOUS at 12:12

## 2019-09-04 RX ADMIN — INSULIN LISPRO 12 UNITS: 100 INJECTION, SOLUTION INTRAVENOUS; SUBCUTANEOUS at 08:04

## 2019-09-04 RX ADMIN — ASPIRIN 81 MG: 81 TABLET, COATED ORAL at 08:04

## 2019-09-04 RX ADMIN — INSULIN LISPRO 12 UNITS: 100 INJECTION, SOLUTION INTRAVENOUS; SUBCUTANEOUS at 12:11

## 2019-09-04 RX ADMIN — BUSPIRONE HYDROCHLORIDE 5 MG: 5 TABLET ORAL at 08:04

## 2019-09-04 RX ADMIN — Medication 10 ML: at 08:08

## 2019-09-04 RX ADMIN — INSULIN LISPRO 8 UNITS: 100 INJECTION, SOLUTION INTRAVENOUS; SUBCUTANEOUS at 08:05

## 2019-09-04 ASSESSMENT — PAIN SCALES - GENERAL
PAINLEVEL_OUTOF10: 0
PAINLEVEL_OUTOF10: 0

## 2019-09-04 NOTE — DISCHARGE SUMMARY
cooperative. HEENT: Normocephalic. Atraumatic. MIRANDA. Neck: Supple. No JVD. Respiratory:  Normal respiratory effort. Clear to auscultation bilaterally without rales, wheezes, or rhonchi. Cardiovascular: Regular rate and rhythm with normal heart tones without murmurs, rubs or gallops. Abdomen: Soft, non-tender, non-distended with normal bowel sounds. Extremities: Left BKA with Coflex dressing dry and intact. Neurologic:  Grossly intact. Psychiatric: Alert and oriented, thought content appropriate, normal insight. Activity: Activity as tolerated    Diet: ADA    Labs: For convenience and continuity at follow-up the following most recent labs are provided:    CBC:   Recent Labs     09/03/19  0442 09/03/19  1222 09/04/19 0457   WBC 7.7  --  8.8   HGB 10.1* 9.7* 9.7*   HCT 29.9* 28.2* 28.8*   MCV 91.7  --  92.3     --  203     BMP:    Recent Labs     09/03/19 0442 09/04/19 0457    140   K 4.1 4.6    101   CO2 26 25   BUN 11 17   CREATININE 0.8 0.8     PT/INR:   Recent Labs     09/03/19 0442   PROTIME 13.0   INR 1.04     APTT:   Recent Labs     09/03/19 0442   APTT 27.8         Discharge Medications:     Current Discharge Medication List           Details   HYDROcodone-acetaminophen (NORCO) 5-325 MG per tablet Take 1 tablet by mouth every 4 hours as needed for Pain for up to 3 days.   Qty: 15 tablet, Refills: 0    Comments: Reduce doses taken as pain becomes manageable  Associated Diagnoses: Status post below knee amputation of left lower extremity (HCC)              Details   vitamin C (ASCORBIC ACID) 500 MG tablet Take 1,000 mg by mouth daily      Cinnamon 500 MG CAPS Take 1,000 mg by mouth 2 times daily      Turmeric Curcumin 500 MG CAPS Take 1 capsule by mouth 2 times daily      busPIRone (BUSPAR) 5 MG tablet TAKE 1 TABLET BY MOUTH THREE TIMES A DAY  Qty: 90 tablet, Refills: 5    Associated Diagnoses: Anxiety      pregabalin (LYRICA) 50 MG capsule Take 50 mg by mouth 2 times

## 2019-09-05 ENCOUNTER — TELEPHONE (OUTPATIENT)
Dept: INTERNAL MEDICINE | Age: 39
End: 2019-09-05

## 2019-09-05 NOTE — TELEPHONE ENCOUNTER
[facility] Transitions Initial Follow Up Call    Outreach made within 2 business days of discharge: Yes    Patient: Bianca Lyles Patient : 1980   MRN: [id number]  Reason for Admission: Patient was admitted on 2019 due to Diabetic foot ulcer  With osteomyelitis. S/P Left BKA. Principal Problem:    Diabetic foot ulcer with osteomyelitis ([address] - S/P Left BKA 0n 2019     Active Problems:    Postoperative Hematoma, Left BKA Stump - S/P Exploration of left below-the-knee amputation stump with evacuation of hematoma and washout and reclosure on 2019    DM, Type 2    Class 3 severe obesity due to excess calories with serious comorbidity and body mass index (BMI) of 45.0 to 49.9 in [facility])    Essential hypertension, benign        Discharge Date: 19       Spoke with: patient. Discharge department/facility: [facility], [address] [place] [address] Interactive Patient Contact:  Was patient able to fill all prescriptions: Yes  Was patient instructed to bring all medications to the follow-up visit: Yes  Is patient taking all medications as directed in the discharge summary? Yes  Does patient understand their discharge instructions: Yes  Does patient have questions or concerns that need addressed prior to 7-14 day follow up office visit: no  Per patient he states he feels he is doing OK. Stump is bandaged and is to be removed next Monday. Home Health has been to home for therapy today. Bowels and bladder working OK. Appetite is Ok. Slept pretty well. Patient did admit he might have had a panic attack since home. He states he became anxious and distracted and scared all at once. Patient states he has a good family network to help him through this adjustment period. Offered to give him Crisis line phone number for support but patient declined  stating he feels with his family support he will be fine.   Patient to bring all medications to HFU for review and will contact office with

## 2019-09-07 LAB
ANAEROBIC CULTURE: NORMAL
CULTURE SURGICAL: NORMAL
GRAM STAIN RESULT: NORMAL

## 2019-09-09 ENCOUNTER — HOSPITAL ENCOUNTER (OUTPATIENT)
Dept: WOUND CARE | Age: 39
Discharge: HOME | End: 2019-09-09
Payer: COMMERCIAL

## 2019-09-09 ENCOUNTER — TELEPHONE (OUTPATIENT)
Dept: WOUND CARE | Age: 39
End: 2019-09-09

## 2019-09-09 VITALS
SYSTOLIC BLOOD PRESSURE: 154 MMHG | BODY MASS INDEX: 39.17 KG/M2 | RESPIRATION RATE: 20 BRPM | WEIGHT: 315 LBS | HEIGHT: 75 IN | HEART RATE: 103 BPM | TEMPERATURE: 98.4 F | DIASTOLIC BLOOD PRESSURE: 100 MMHG

## 2019-09-09 DIAGNOSIS — L97.422 MIDFOOT ULCERATION, LEFT, WITH FAT LAYER EXPOSED (HCC): ICD-10-CM

## 2019-09-09 DIAGNOSIS — L97.412 ULCER OF RIGHT HEEL, WITH FAT LAYER EXPOSED (HCC): ICD-10-CM

## 2019-09-09 PROCEDURE — 29580 STRAPPING UNNA BOOT: CPT

## 2019-09-09 RX ORDER — CEPHALEXIN 500 MG/1
500 CAPSULE ORAL 4 TIMES DAILY
Qty: 40 CAPSULE | Refills: 0 | Status: SHIPPED | OUTPATIENT
Start: 2019-09-09 | End: 2019-09-19

## 2019-09-09 ASSESSMENT — PAIN DESCRIPTION - ONSET: ONSET: ON-GOING

## 2019-09-09 ASSESSMENT — PAIN DESCRIPTION - PROGRESSION: CLINICAL_PROGRESSION: NOT CHANGED

## 2019-09-09 ASSESSMENT — PAIN DESCRIPTION - DESCRIPTORS: DESCRIPTORS: BURNING;ACHING

## 2019-09-09 ASSESSMENT — PAIN SCALES - GENERAL: PAINLEVEL_OUTOF10: 5

## 2019-09-09 ASSESSMENT — PAIN DESCRIPTION - FREQUENCY: FREQUENCY: CONTINUOUS

## 2019-09-09 ASSESSMENT — PAIN DESCRIPTION - PAIN TYPE: TYPE: SURGICAL PAIN

## 2019-09-09 ASSESSMENT — PAIN DESCRIPTION - LOCATION: LOCATION: LEG

## 2019-09-09 ASSESSMENT — PAIN DESCRIPTION - ORIENTATION: ORIENTATION: LEFT

## 2019-09-09 NOTE — PLAN OF CARE
Problem: Pain:  Description  Pain management should include both nonpharmacologic and pharmacologic interventions.   Goal: Pain level will decrease  Description  Pain level will decrease  Outcome: Ongoing  Goal: Control of acute pain  Description  Control of acute pain  Outcome: Ongoing  Goal: Control of chronic pain  Description  Control of chronic pain  Outcome: Ongoing     Problem: Weight control:  Goal: Ability to maintain an optimal weight for height and age will be supported  Description  Ability to maintain an optimal weight for height and age will be supported  Outcome: Ongoing     Problem: Blood Glucose:  Goal: Ability to maintain appropriate glucose levels will improve  Description  Ability to maintain appropriate glucose levels will improve  Outcome: Ongoing

## 2019-09-10 ENCOUNTER — TELEPHONE (OUTPATIENT)
Dept: PRIMARY CARE CLINIC | Age: 39
End: 2019-09-10

## 2019-09-10 NOTE — TELEPHONE ENCOUNTER
Patient called to cancel a Hospital f/u for Kareem(himself). A return call  is not needed at this time. Patient is being admitted in hospital at [place], [place]. Thank you.

## 2019-09-11 ENCOUNTER — HOSPITAL ENCOUNTER (OUTPATIENT)
Dept: WOUND CARE | Age: 39
Discharge: HOME | End: 2019-09-11

## 2019-09-13 ENCOUNTER — TELEPHONE (OUTPATIENT)
Dept: INTERNAL MEDICINE CLINIC | Age: 39
End: 2019-09-13

## 2019-09-13 NOTE — TELEPHONE ENCOUNTER
Pt has been in [facility] since 9/10  And he is going to be dc from there this weekend most likely   pts [facility] certification ends today so they will have to treat as a new referral  - will Jimena/ Dr Beny Peterson follow for [facility]  ?

## 2019-09-18 ENCOUNTER — TELEPHONE (OUTPATIENT)
Dept: PRIMARY CARE CLINIC | Age: 39
End: 2019-09-18

## 2019-09-18 DIAGNOSIS — L02.91 ABSCESS: Primary | ICD-10-CM

## 2019-09-19 DIAGNOSIS — L02.91 ABSCESS: Primary | ICD-10-CM

## 2019-09-20 ENCOUNTER — TELEPHONE (OUTPATIENT)
Dept: INTERNAL MEDICINE CLINIC | Age: 39
End: 2019-09-20

## 2019-09-20 NOTE — TELEPHONE ENCOUNTER
YUNG DICKERSON [facility] has resumed care for this pt - pt will be getting IV antibiotics and PICC line care and they will see him twice weekly     Please advise if approved

## 2019-09-23 LAB
ALBUMIN SERPL-MCNC: 3.9 G/DL (ref 3.5–5.2)
ALP BLD-CCNC: 66 U/L (ref 40–130)
ALT SERPL-CCNC: 16 U/L (ref 5–41)
ANION GAP SERPL CALCULATED.3IONS-SCNC: 16 MMOL/L (ref 7–19)
AST SERPL-CCNC: 14 U/L (ref 5–40)
BASOPHILS ABSOLUTE: 0 K/UL (ref 0–0.2)
BASOPHILS RELATIVE PERCENT: 0.3 % (ref 0–1)
BILIRUB SERPL-MCNC: 0.3 MG/DL (ref 0.2–1.2)
BUN BLDV-MCNC: 11 MG/DL (ref 6–20)
CALCIUM SERPL-MCNC: 9.2 MG/DL (ref 8.6–10)
CHLORIDE BLD-SCNC: 104 MMOL/L (ref 98–111)
CO2: 22 MMOL/L (ref 22–29)
CREAT SERPL-MCNC: 0.9 MG/DL (ref 0.5–1.2)
EOSINOPHILS ABSOLUTE: 0.2 K/UL (ref 0–0.6)
EOSINOPHILS RELATIVE PERCENT: 3 % (ref 0–5)
GFR NON-AFRICAN AMERICAN: >60
GLUCOSE BLD-MCNC: 87 MG/DL (ref 74–109)
HCT VFR BLD CALC: 36.9 % (ref 42–52)
HEMOGLOBIN: 12.2 G/DL (ref 14–18)
IMMATURE GRANULOCYTES #: 0 K/UL
LYMPHOCYTES ABSOLUTE: 2.3 K/UL (ref 1.1–4.5)
LYMPHOCYTES RELATIVE PERCENT: 34.7 % (ref 20–40)
MCH RBC QN AUTO: 30.6 PG (ref 27–31)
MCHC RBC AUTO-ENTMCNC: 33.1 G/DL (ref 33–37)
MCV RBC AUTO: 92.5 FL (ref 80–94)
MONOCYTES ABSOLUTE: 0.5 K/UL (ref 0–0.9)
MONOCYTES RELATIVE PERCENT: 8 % (ref 0–10)
NEUTROPHILS ABSOLUTE: 3.6 K/UL (ref 1.5–7.5)
NEUTROPHILS RELATIVE PERCENT: 53.7 % (ref 50–65)
PDW BLD-RTO: 12.9 % (ref 11.5–14.5)
PLATELET # BLD: 210 K/UL (ref 130–400)
PMV BLD AUTO: 9.9 FL (ref 9.4–12.4)
POTASSIUM SERPL-SCNC: 3.8 MMOL/L (ref 3.5–5)
RBC # BLD: 3.99 M/UL (ref 4.7–6.1)
SODIUM BLD-SCNC: 142 MMOL/L (ref 136–145)
TOTAL PROTEIN: 7.2 G/DL (ref 6.6–8.7)
VANCOMYCIN TROUGH: 18.6 UG/ML (ref 10–20)
WBC # BLD: 6.7 K/UL (ref 4.8–10.8)

## 2019-09-24 ENCOUNTER — TELEPHONE (OUTPATIENT)
Dept: INTERNAL MEDICINE | Age: 39
End: 2019-09-24

## 2019-09-25 ENCOUNTER — CARE COORDINATION (OUTPATIENT)
Dept: CARE COORDINATION | Age: 39
End: 2019-09-25

## 2019-09-26 LAB
ALBUMIN SERPL-MCNC: 3.7 G/DL (ref 3.5–5.2)
ALP BLD-CCNC: 63 U/L (ref 40–130)
ALT SERPL-CCNC: 15 U/L (ref 5–41)
ANION GAP SERPL CALCULATED.3IONS-SCNC: 13 MMOL/L (ref 7–19)
AST SERPL-CCNC: 12 U/L (ref 5–40)
BILIRUB SERPL-MCNC: 0.3 MG/DL (ref 0.2–1.2)
BUN BLDV-MCNC: 9 MG/DL (ref 6–20)
CALCIUM SERPL-MCNC: 9.4 MG/DL (ref 8.6–10)
CHLORIDE BLD-SCNC: 104 MMOL/L (ref 98–111)
CO2: 26 MMOL/L (ref 22–29)
CREAT SERPL-MCNC: 0.8 MG/DL (ref 0.5–1.2)
GFR NON-AFRICAN AMERICAN: >60
GLUCOSE BLD-MCNC: 234 MG/DL (ref 74–109)
POTASSIUM SERPL-SCNC: 4.5 MMOL/L (ref 3.5–5)
SODIUM BLD-SCNC: 143 MMOL/L (ref 136–145)
TOTAL PROTEIN: 7.1 G/DL (ref 6.6–8.7)
VANCOMYCIN TROUGH: 14.2 UG/ML (ref 10–20)

## 2019-09-28 ENCOUNTER — APPOINTMENT (OUTPATIENT)
Dept: GENERAL RADIOLOGY | Age: 39
End: 2019-09-28
Payer: COMMERCIAL

## 2019-09-28 ENCOUNTER — HOSPITAL ENCOUNTER (EMERGENCY)
Age: 39
Discharge: ANOTHER HOSPITAL | End: 2019-09-28
Attending: EMERGENCY MEDICINE
Payer: COMMERCIAL

## 2019-09-28 VITALS
HEART RATE: 90 BPM | DIASTOLIC BLOOD PRESSURE: 61 MMHG | BODY MASS INDEX: 39.17 KG/M2 | SYSTOLIC BLOOD PRESSURE: 143 MMHG | HEIGHT: 75 IN | WEIGHT: 315 LBS | RESPIRATION RATE: 18 BRPM | TEMPERATURE: 97.6 F | OXYGEN SATURATION: 94 %
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
## 2019-09-28 DIAGNOSIS — T81.31XA POSTOPERATIVE WOUND DEHISCENCE, INITIAL ENCOUNTER: Primary | ICD-10-CM

## 2019-09-28 DIAGNOSIS — S89.92XA INJURY OF LEFT LOWER EXTREMITY, INITIAL ENCOUNTER: ICD-10-CM

## 2019-09-28 LAB
ALBUMIN SERPL-MCNC: 3.5 G/DL (ref 3.5–5.2)
ALP BLD-CCNC: 56 U/L (ref 40–130)
ALT SERPL-CCNC: 12 U/L (ref 5–41)
ANION GAP SERPL CALCULATED.3IONS-SCNC: 13 MMOL/L (ref 7–19)
AST SERPL-CCNC: 10 U/L (ref 5–40)
BASOPHILS ABSOLUTE: 0 K/UL (ref 0–0.2)
BASOPHILS RELATIVE PERCENT: 0.3 % (ref 0–1)
BILIRUB SERPL-MCNC: 0.3 MG/DL (ref 0.2–1.2)
BUN BLDV-MCNC: 21 MG/DL (ref 6–20)
CALCIUM SERPL-MCNC: 8.9 MG/DL (ref 8.6–10)
CHLORIDE BLD-SCNC: 104 MMOL/L (ref 98–111)
CO2: 26 MMOL/L (ref 22–29)
CREAT SERPL-MCNC: 1.4 MG/DL (ref 0.5–1.2)
EOSINOPHILS ABSOLUTE: 0.3 K/UL (ref 0–0.6)
EOSINOPHILS RELATIVE PERCENT: 4 % (ref 0–5)
GFR NON-AFRICAN AMERICAN: 57
GLUCOSE BLD-MCNC: 237 MG/DL (ref 74–109)
HCT VFR BLD CALC: 37.3 % (ref 42–52)
HEMOGLOBIN: 12.4 G/DL (ref 14–18)
IMMATURE GRANULOCYTES #: 0 K/UL
LYMPHOCYTES ABSOLUTE: 1.7 K/UL (ref 1.1–4.5)
LYMPHOCYTES RELATIVE PERCENT: 27.4 % (ref 20–40)
MCH RBC QN AUTO: 30.6 PG (ref 27–31)
MCHC RBC AUTO-ENTMCNC: 33.2 G/DL (ref 33–37)
MCV RBC AUTO: 92.1 FL (ref 80–94)
MONOCYTES ABSOLUTE: 0.5 K/UL (ref 0–0.9)
MONOCYTES RELATIVE PERCENT: 7.5 % (ref 0–10)
NEUTROPHILS ABSOLUTE: 3.8 K/UL (ref 1.5–7.5)
NEUTROPHILS RELATIVE PERCENT: 60.5 % (ref 50–65)
PDW BLD-RTO: 13 % (ref 11.5–14.5)
PLATELET # BLD: 176 K/UL (ref 130–400)
PMV BLD AUTO: 10.2 FL (ref 9.4–12.4)
POTASSIUM SERPL-SCNC: 4.4 MMOL/L (ref 3.5–5)
RBC # BLD: 4.05 M/UL (ref 4.7–6.1)
SODIUM BLD-SCNC: 143 MMOL/L (ref 136–145)
TOTAL PROTEIN: 7 G/DL (ref 6.6–8.7)
WBC # BLD: 6.2 K/UL (ref 4.8–10.8)

## 2019-09-28 PROCEDURE — 73590 X-RAY EXAM OF LOWER LEG: CPT

## 2019-09-28 PROCEDURE — 96375 TX/PRO/DX INJ NEW DRUG ADDON: CPT

## 2019-09-28 PROCEDURE — 80053 COMPREHEN METABOLIC PANEL: CPT

## 2019-09-28 PROCEDURE — 96374 THER/PROPH/DIAG INJ IV PUSH: CPT

## 2019-09-28 PROCEDURE — 36415 COLL VENOUS BLD VENIPUNCTURE: CPT

## 2019-09-28 PROCEDURE — 99284 EMERGENCY DEPT VISIT MOD MDM: CPT

## 2019-09-28 PROCEDURE — 85025 COMPLETE CBC W/AUTO DIFF WBC: CPT

## 2019-09-28 PROCEDURE — 6360000002 HC RX W HCPCS: Performed by: EMERGENCY MEDICINE

## 2019-09-28 RX ORDER — METRONIDAZOLE 250 MG/1
500 TABLET ORAL 3 TIMES DAILY
COMMUNITY
End: 2020-05-13

## 2019-09-28 RX ORDER — MORPHINE SULFATE 4 MG/ML
4 INJECTION, SOLUTION INTRAMUSCULAR; INTRAVENOUS ONCE
Status: COMPLETED | OUTPATIENT
Start: 2019-09-28 | End: 2019-09-28

## 2019-09-28 RX ADMIN — HYDROMORPHONE HYDROCHLORIDE 1 MG: 1 INJECTION, SOLUTION INTRAMUSCULAR; INTRAVENOUS; SUBCUTANEOUS at 07:31

## 2019-09-28 RX ADMIN — MORPHINE SULFATE 4 MG: 4 INJECTION INTRAVENOUS at 06:29

## 2019-09-28 ASSESSMENT — ENCOUNTER SYMPTOMS
VOMITING: 0
SHORTNESS OF BREATH: 0
BACK PAIN: 0
NAUSEA: 0
ABDOMINAL PAIN: 0

## 2019-09-28 ASSESSMENT — PAIN SCALES - GENERAL
PAINLEVEL_OUTOF10: 6
PAINLEVEL_OUTOF10: 2
PAINLEVEL_OUTOF10: 2
PAINLEVEL_OUTOF10: 1

## 2019-09-28 NOTE — ED PROVIDER NOTES
syncope. All other systems reviewed and are negative. PAST MEDICALHISTORY     Past Medical History:   Diagnosis Date    Diabetes mellitus ([address]     Disease of blood and blood forming organ     GERD (gastroesophageal reflux disease)     Hyperlipidemia     Hypertension     Sleep apnea     no longer present with weight loss 10-5-17         SURGICAL HISTORY       Past Surgical History:   Procedure Laterality Date    COLONOSCOPY      ENDOSCOPY, COLON, DIAGNOSTIC      INCISION AND DRAINAGE Bilateral 7/9/2019    NON-EXCISIONAL DEBRIDEMENT OF RIGHT MEDIAL HEEL WOUND 2.6CM X 1.6CM; NON-EXCISIONAL DEBRIDEMENT OF RIGHT LATERAL HEEL WOUND 3CM X 1CM; EXCISIONAL DEBRIDEMENT OF SKIN AND SUBCUTANEOUS TISSUE LEFT HEEL WOUND 5CM X 2.5CM; EXCISIONAL DEBRIDEMENT OF SKIN, SUBCUTANEOUS TISSUE, AND TENDON LEFT DISTAL FOOT 9CM X 5CM performed by Steven Gardner MD at [address] [place] [address] And [address] [place] [address] Bilateral 7/11/2019    NON-EXCISIONAL DEBRIDEMENT OF RIGHT MEDIAL HEEL WOUND 2 X 1CM; NON-EXCISIONAL DEBRIDEMENT OF RIGHT HEEL WOUND 3 X 1CM; NON-EXCISIONAL DEBRIDEMENT OF LEFT HEEL WOUND 4 X 2CM performed by Steven Gardner MD at [address] [place] [address] Left 9/3/2019    WASHOUT BELOW KNEE AMPUTATION performed by Steven Gardner MD at [address] Left 8/30/2019    LEFT BELOW KNEE AMPUTATION performed by Steven Gardner MD at [address] EGD TRANSORAL BIOPSY SINGLE/MULTIPLE N/A 10/5/2017    Dr Taylor-w/dilation over wire, 47 Nepali-esophageal stricture-Faith (-) chronic inflammation    TOE AMPUTATION Left 1/31/2019    TJR. Left second toe amputation at the metatarsophalangeal.    TOE AMPUTATION Left 7/11/2019    AMPUTATION LEFT FIFITH TOE performed by Steven Gardner MD at [address] [facility]     Previous Medications    ASPIRIN 81 MG EC TABLET    Take 1 tablet by mouth daily    BLOOD GLUCOSE MONITORING SUPPL (ONE TOUCH ULTRA 2) ED.    Patient reports that he does not want to see Dr. Andre Welsh here regarding this wound. He is requesting transfer back to [facility] to follow with his surgeon Dr. Jen Ahn. Case has been discussed with bars to his transfer center. Accepting physician is Dr. Clint Stokes. PROCEDURES:  Unless otherwise noted below, none     Procedures    FINAL IMPRESSION      1. Postoperative wound dehiscence, initial encounter    2. Injury of left lower extremity, initial encounter    3.  Fall, initial encounter          DISPOSITION/PLAN   DISPOSITION Decision To Transfer 09/28/2019 06:59:42 AM    (Please note that portions of this note were completed with a voice recognition program.  Efforts were made to edit thedictations but occasionally words are mis-transcribed.)    Dm Craig MD (electronically signed)  Attending Emergency Physician          Dm Craig MD  09/28/19 0700

## 2019-09-28 NOTE — ED NOTES
Pt asking for dr. Flavio Camacho at [facility] in [facility] to handle his case. He was discharged from there 8 days ago.      Abdulaziz James RN  09/28/19 0619

## 2019-10-08 ENCOUNTER — TELEPHONE (OUTPATIENT)
Dept: INTERNAL MEDICINE CLINIC | Age: 39
End: 2019-10-08

## 2019-10-10 LAB
ALBUMIN SERPL-MCNC: 3.9 G/DL (ref 3.5–5.2)
ALP BLD-CCNC: 62 U/L (ref 40–130)
ALT SERPL-CCNC: 13 U/L (ref 5–41)
ANION GAP SERPL CALCULATED.3IONS-SCNC: 12 MMOL/L (ref 7–19)
AST SERPL-CCNC: 11 U/L (ref 5–40)
BASOPHILS ABSOLUTE: 0 K/UL (ref 0–0.2)
BASOPHILS RELATIVE PERCENT: 0.4 % (ref 0–1)
BILIRUB SERPL-MCNC: 0.3 MG/DL (ref 0.2–1.2)
BUN BLDV-MCNC: 20 MG/DL (ref 6–20)
C-REACTIVE PROTEIN: 0.35 MG/DL (ref 0–0.5)
CALCIUM SERPL-MCNC: 9.3 MG/DL (ref 8.6–10)
CHLORIDE BLD-SCNC: 101 MMOL/L (ref 98–111)
CO2: 25 MMOL/L (ref 22–29)
CREAT SERPL-MCNC: 1.3 MG/DL (ref 0.5–1.2)
EOSINOPHILS ABSOLUTE: 0.3 K/UL (ref 0–0.6)
EOSINOPHILS RELATIVE PERCENT: 3.6 % (ref 0–5)
GFR NON-AFRICAN AMERICAN: >60
GLUCOSE BLD-MCNC: 245 MG/DL (ref 74–109)
HCT VFR BLD CALC: 33 % (ref 42–52)
HEMOGLOBIN: 11.2 G/DL (ref 14–18)
IMMATURE GRANULOCYTES #: 0 K/UL
LYMPHOCYTES ABSOLUTE: 2 K/UL (ref 1.1–4.5)
LYMPHOCYTES RELATIVE PERCENT: 23.6 % (ref 20–40)
MCH RBC QN AUTO: 30.2 PG (ref 27–31)
MCHC RBC AUTO-ENTMCNC: 33.9 G/DL (ref 33–37)
MCV RBC AUTO: 88.9 FL (ref 80–94)
MONOCYTES ABSOLUTE: 0.5 K/UL (ref 0–0.9)
MONOCYTES RELATIVE PERCENT: 5.3 % (ref 0–10)
NEUTROPHILS ABSOLUTE: 5.6 K/UL (ref 1.5–7.5)
NEUTROPHILS RELATIVE PERCENT: 66.7 % (ref 50–65)
PDW BLD-RTO: 12.7 % (ref 11.5–14.5)
PLATELET # BLD: 260 K/UL (ref 130–400)
PMV BLD AUTO: 10.4 FL (ref 9.4–12.4)
POTASSIUM SERPL-SCNC: 3.6 MMOL/L (ref 3.5–5)
RBC # BLD: 3.71 M/UL (ref 4.7–6.1)
SEDIMENTATION RATE, ERYTHROCYTE: 108 MM/HR (ref 0–10)
SODIUM BLD-SCNC: 138 MMOL/L (ref 136–145)
TOTAL PROTEIN: 7.6 G/DL (ref 6.6–8.7)
VANCOMYCIN TROUGH: 15 UG/ML (ref 10–20)
WBC # BLD: 8.4 K/UL (ref 4.8–10.8)

## 2019-10-13 LAB
ALBUMIN SERPL-MCNC: 4 G/DL (ref 3.5–5.2)
ALP BLD-CCNC: 63 U/L (ref 40–130)
ALT SERPL-CCNC: 13 U/L (ref 5–41)
ANION GAP SERPL CALCULATED.3IONS-SCNC: 14 MMOL/L (ref 7–19)
AST SERPL-CCNC: 11 U/L (ref 5–40)
BASOPHILS ABSOLUTE: 0 K/UL (ref 0–0.2)
BASOPHILS RELATIVE PERCENT: 0.5 % (ref 0–1)
BILIRUB SERPL-MCNC: 0.3 MG/DL (ref 0.2–1.2)
BUN BLDV-MCNC: 17 MG/DL (ref 6–20)
C-REACTIVE PROTEIN: 0.26 MG/DL (ref 0–0.5)
CALCIUM SERPL-MCNC: 9.7 MG/DL (ref 8.6–10)
CHLORIDE BLD-SCNC: 105 MMOL/L (ref 98–111)
CO2: 25 MMOL/L (ref 22–29)
CREAT SERPL-MCNC: 1.2 MG/DL (ref 0.5–1.2)
EOSINOPHILS ABSOLUTE: 0.3 K/UL (ref 0–0.6)
EOSINOPHILS RELATIVE PERCENT: 4.2 % (ref 0–5)
GFR NON-AFRICAN AMERICAN: >60
GLUCOSE BLD-MCNC: 156 MG/DL (ref 74–109)
HCT VFR BLD CALC: 35.1 % (ref 42–52)
HEMOGLOBIN: 11.9 G/DL (ref 14–18)
IMMATURE GRANULOCYTES #: 0 K/UL
LYMPHOCYTES ABSOLUTE: 1.7 K/UL (ref 1.1–4.5)
LYMPHOCYTES RELATIVE PERCENT: 27.2 % (ref 20–40)
MCH RBC QN AUTO: 29.9 PG (ref 27–31)
MCHC RBC AUTO-ENTMCNC: 33.9 G/DL (ref 33–37)
MCV RBC AUTO: 88.2 FL (ref 80–94)
MONOCYTES ABSOLUTE: 0.5 K/UL (ref 0–0.9)
MONOCYTES RELATIVE PERCENT: 7.8 % (ref 0–10)
NEUTROPHILS ABSOLUTE: 3.8 K/UL (ref 1.5–7.5)
NEUTROPHILS RELATIVE PERCENT: 60 % (ref 50–65)
PDW BLD-RTO: 12.8 % (ref 11.5–14.5)
PLATELET # BLD: 221 K/UL (ref 130–400)
PMV BLD AUTO: 10.1 FL (ref 9.4–12.4)
POTASSIUM SERPL-SCNC: 4.3 MMOL/L (ref 3.5–5)
RBC # BLD: 3.98 M/UL (ref 4.7–6.1)
SEDIMENTATION RATE, ERYTHROCYTE: 71 MM/HR (ref 0–10)
SODIUM BLD-SCNC: 144 MMOL/L (ref 136–145)
TOTAL PROTEIN: 7.9 G/DL (ref 6.6–8.7)
VANCOMYCIN TROUGH: 15.6 UG/ML (ref 10–20)
WBC # BLD: 6.4 K/UL (ref 4.8–10.8)

## 2019-10-17 DIAGNOSIS — I10 ESSENTIAL HYPERTENSION: ICD-10-CM

## 2019-10-17 LAB
ALBUMIN SERPL-MCNC: 3.9 G/DL (ref 3.5–5.2)
ALP BLD-CCNC: 66 U/L (ref 40–130)
ALT SERPL-CCNC: 11 U/L (ref 5–41)
ANION GAP SERPL CALCULATED.3IONS-SCNC: 12 MMOL/L (ref 7–19)
AST SERPL-CCNC: 11 U/L (ref 5–40)
BASOPHILS ABSOLUTE: 0 K/UL (ref 0–0.2)
BASOPHILS RELATIVE PERCENT: 0.6 % (ref 0–1)
BILIRUB SERPL-MCNC: 0.3 MG/DL (ref 0.2–1.2)
BUN BLDV-MCNC: 17 MG/DL (ref 6–20)
C-REACTIVE PROTEIN: 0.15 MG/DL (ref 0–0.5)
CALCIUM SERPL-MCNC: 9.7 MG/DL (ref 8.6–10)
CHLORIDE BLD-SCNC: 105 MMOL/L (ref 98–111)
CO2: 23 MMOL/L (ref 22–29)
CREAT SERPL-MCNC: 1.3 MG/DL (ref 0.5–1.2)
EOSINOPHILS ABSOLUTE: 0.4 K/UL (ref 0–0.6)
EOSINOPHILS RELATIVE PERCENT: 5.2 % (ref 0–5)
GFR NON-AFRICAN AMERICAN: >60
GLUCOSE BLD-MCNC: 196 MG/DL (ref 74–109)
HCT VFR BLD CALC: 36.2 % (ref 42–52)
HEMOGLOBIN: 12.2 G/DL (ref 14–18)
IMMATURE GRANULOCYTES #: 0.1 K/UL
LYMPHOCYTES ABSOLUTE: 2.1 K/UL (ref 1.1–4.5)
LYMPHOCYTES RELATIVE PERCENT: 29.1 % (ref 20–40)
MCH RBC QN AUTO: 30.4 PG (ref 27–31)
MCHC RBC AUTO-ENTMCNC: 33.7 G/DL (ref 33–37)
MCV RBC AUTO: 90.3 FL (ref 80–94)
MONOCYTES ABSOLUTE: 0.5 K/UL (ref 0–0.9)
MONOCYTES RELATIVE PERCENT: 7.3 % (ref 0–10)
NEUTROPHILS ABSOLUTE: 4.1 K/UL (ref 1.5–7.5)
NEUTROPHILS RELATIVE PERCENT: 57.1 % (ref 50–65)
PDW BLD-RTO: 12.4 % (ref 11.5–14.5)
PLATELET # BLD: 222 K/UL (ref 130–400)
PMV BLD AUTO: 10 FL (ref 9.4–12.4)
POTASSIUM SERPL-SCNC: 4.4 MMOL/L (ref 3.5–5)
RBC # BLD: 4.01 M/UL (ref 4.7–6.1)
SEDIMENTATION RATE, ERYTHROCYTE: 43 MM/HR (ref 0–10)
SODIUM BLD-SCNC: 140 MMOL/L (ref 136–145)
TOTAL PROTEIN: 7.5 G/DL (ref 6.6–8.7)
VANCOMYCIN TROUGH: 15.3 UG/ML (ref 10–20)
WBC # BLD: 7.2 K/UL (ref 4.8–10.8)

## 2019-10-17 RX ORDER — AMLODIPINE BESYLATE 5 MG/1
5 TABLET ORAL DAILY
Qty: 30 TABLET | Refills: 1 | Status: SHIPPED | OUTPATIENT
Start: 2019-10-17 | End: 2019-10-24

## 2019-10-21 LAB
ALBUMIN SERPL-MCNC: 4.1 G/DL (ref 3.5–5.2)
ALP BLD-CCNC: 66 U/L (ref 40–130)
ALT SERPL-CCNC: 16 U/L (ref 5–41)
ANION GAP SERPL CALCULATED.3IONS-SCNC: 17 MMOL/L (ref 7–19)
AST SERPL-CCNC: 12 U/L (ref 5–40)
BASOPHILS ABSOLUTE: 0.1 K/UL (ref 0–0.2)
BASOPHILS RELATIVE PERCENT: 0.7 % (ref 0–1)
BILIRUB SERPL-MCNC: 0.3 MG/DL (ref 0.2–1.2)
BUN BLDV-MCNC: 17 MG/DL (ref 6–20)
C-REACTIVE PROTEIN: 0.1 MG/DL (ref 0–0.5)
CALCIUM SERPL-MCNC: 9.1 MG/DL (ref 8.6–10)
CHLORIDE BLD-SCNC: 104 MMOL/L (ref 98–111)
CO2: 21 MMOL/L (ref 22–29)
CREAT SERPL-MCNC: 1.3 MG/DL (ref 0.5–1.2)
EOSINOPHILS ABSOLUTE: 0.5 K/UL (ref 0–0.6)
EOSINOPHILS RELATIVE PERCENT: 6.6 % (ref 0–5)
GFR NON-AFRICAN AMERICAN: >60
GLUCOSE BLD-MCNC: 167 MG/DL (ref 74–109)
HCT VFR BLD CALC: 36.4 % (ref 42–52)
HEMOGLOBIN: 12.3 G/DL (ref 14–18)
IMMATURE GRANULOCYTES #: 0 K/UL
LYMPHOCYTES ABSOLUTE: 2.2 K/UL (ref 1.1–4.5)
LYMPHOCYTES RELATIVE PERCENT: 30.6 % (ref 20–40)
MCH RBC QN AUTO: 29.8 PG (ref 27–31)
MCHC RBC AUTO-ENTMCNC: 33.8 G/DL (ref 33–37)
MCV RBC AUTO: 88.1 FL (ref 80–94)
MONOCYTES ABSOLUTE: 0.5 K/UL (ref 0–0.9)
MONOCYTES RELATIVE PERCENT: 6.4 % (ref 0–10)
NEUTROPHILS ABSOLUTE: 4 K/UL (ref 1.5–7.5)
NEUTROPHILS RELATIVE PERCENT: 55.3 % (ref 50–65)
PDW BLD-RTO: 12.8 % (ref 11.5–14.5)
PLATELET # BLD: 174 K/UL (ref 130–400)
PMV BLD AUTO: 10.5 FL (ref 9.4–12.4)
POTASSIUM SERPL-SCNC: 3.8 MMOL/L (ref 3.5–5)
RBC # BLD: 4.13 M/UL (ref 4.7–6.1)
SEDIMENTATION RATE, ERYTHROCYTE: 30 MM/HR (ref 0–10)
SODIUM BLD-SCNC: 142 MMOL/L (ref 136–145)
TOTAL PROTEIN: 7.9 G/DL (ref 6.6–8.7)
VANCOMYCIN TROUGH: 16.2 UG/ML (ref 10–20)
WBC # BLD: 7.2 K/UL (ref 4.8–10.8)

## 2019-10-24 ENCOUNTER — TELEPHONE (OUTPATIENT)
Dept: PRIMARY CARE CLINIC | Age: 39
End: 2019-10-24

## 2019-10-24 LAB
ALBUMIN SERPL-MCNC: 4 G/DL (ref 3.5–5.2)
ALP BLD-CCNC: 64 U/L (ref 40–130)
ALT SERPL-CCNC: 16 U/L (ref 5–41)
ANION GAP SERPL CALCULATED.3IONS-SCNC: 14 MMOL/L (ref 7–19)
AST SERPL-CCNC: 14 U/L (ref 5–40)
BASOPHILS ABSOLUTE: 0 K/UL (ref 0–0.2)
BASOPHILS RELATIVE PERCENT: 0.5 % (ref 0–1)
BILIRUB SERPL-MCNC: <0.2 MG/DL (ref 0.2–1.2)
BUN BLDV-MCNC: 18 MG/DL (ref 6–20)
C-REACTIVE PROTEIN: 0.11 MG/DL (ref 0–0.5)
CALCIUM SERPL-MCNC: 9.3 MG/DL (ref 8.6–10)
CHLORIDE BLD-SCNC: 102 MMOL/L (ref 98–111)
CO2: 22 MMOL/L (ref 22–29)
CREAT SERPL-MCNC: 1.4 MG/DL (ref 0.5–1.2)
EOSINOPHILS ABSOLUTE: 0.3 K/UL (ref 0–0.6)
EOSINOPHILS RELATIVE PERCENT: 5.7 % (ref 0–5)
GFR NON-AFRICAN AMERICAN: 57
GLUCOSE BLD-MCNC: 174 MG/DL (ref 74–109)
HCT VFR BLD CALC: 36.5 % (ref 42–52)
HEMOGLOBIN: 12.1 G/DL (ref 14–18)
IMMATURE GRANULOCYTES #: 0 K/UL
LYMPHOCYTES ABSOLUTE: 1.9 K/UL (ref 1.1–4.5)
LYMPHOCYTES RELATIVE PERCENT: 32.3 % (ref 20–40)
MCH RBC QN AUTO: 29.4 PG (ref 27–31)
MCHC RBC AUTO-ENTMCNC: 33.2 G/DL (ref 33–37)
MCV RBC AUTO: 88.8 FL (ref 80–94)
MONOCYTES ABSOLUTE: 0.5 K/UL (ref 0–0.9)
MONOCYTES RELATIVE PERCENT: 7.9 % (ref 0–10)
NEUTROPHILS ABSOLUTE: 3.1 K/UL (ref 1.5–7.5)
NEUTROPHILS RELATIVE PERCENT: 53.3 % (ref 50–65)
PDW BLD-RTO: 12.9 % (ref 11.5–14.5)
PLATELET # BLD: 150 K/UL (ref 130–400)
PMV BLD AUTO: 10.9 FL (ref 9.4–12.4)
POTASSIUM SERPL-SCNC: 4.3 MMOL/L (ref 3.5–5)
RBC # BLD: 4.11 M/UL (ref 4.7–6.1)
SEDIMENTATION RATE, ERYTHROCYTE: 28 MM/HR (ref 0–10)
SODIUM BLD-SCNC: 138 MMOL/L (ref 136–145)
TOTAL PROTEIN: 7.5 G/DL (ref 6.6–8.7)
VANCOMYCIN TROUGH: 16.7 UG/ML (ref 10–20)
WBC # BLD: 5.8 K/UL (ref 4.8–10.8)

## 2019-10-24 RX ORDER — AMLODIPINE BESYLATE 10 MG/1
10 TABLET ORAL DAILY
Qty: 30 TABLET | Refills: 0 | Status: SHIPPED | OUTPATIENT
Start: 2019-10-24 | End: 2019-11-16 | Stop reason: SDUPTHER

## 2019-10-28 ENCOUNTER — OFFICE VISIT (OUTPATIENT)
Dept: PRIMARY CARE CLINIC | Age: 39
End: 2019-10-28
Payer: COMMERCIAL

## 2019-10-28 ENCOUNTER — TELEPHONE (OUTPATIENT)
Dept: PRIMARY CARE CLINIC | Age: 39
End: 2019-10-28

## 2019-10-28 ENCOUNTER — TELEPHONE (OUTPATIENT)
Dept: INTERNAL MEDICINE CLINIC | Age: 39
End: 2019-10-28

## 2019-10-28 VITALS
HEIGHT: 75 IN | BODY MASS INDEX: 39.17 KG/M2 | HEART RATE: 122 BPM | DIASTOLIC BLOOD PRESSURE: 84 MMHG | SYSTOLIC BLOOD PRESSURE: 122 MMHG | OXYGEN SATURATION: 98 % | TEMPERATURE: 97.9 F | WEIGHT: 315 LBS

## 2019-10-28 DIAGNOSIS — I10 ESSENTIAL HYPERTENSION: ICD-10-CM

## 2019-10-28 DIAGNOSIS — Z79.4 TYPE 2 DIABETES MELLITUS WITH OTHER SKIN COMPLICATION, WITH LONG-TERM CURRENT USE OF INSULIN (HCC): ICD-10-CM

## 2019-10-28 DIAGNOSIS — F41.9 ANXIETY: Primary | ICD-10-CM

## 2019-10-28 DIAGNOSIS — R00.0 TACHYCARDIA: ICD-10-CM

## 2019-10-28 DIAGNOSIS — E11.628 TYPE 2 DIABETES MELLITUS WITH OTHER SKIN COMPLICATION, WITH LONG-TERM CURRENT USE OF INSULIN (HCC): ICD-10-CM

## 2019-10-28 DIAGNOSIS — F32.9 REACTIVE DEPRESSION: ICD-10-CM

## 2019-10-28 PROBLEM — L97.509 DIABETIC FOOT ULCER WITH OSTEOMYELITIS (HCC): Status: RESOLVED | Noted: 2019-08-30 | Resolved: 2019-10-28

## 2019-10-28 PROBLEM — M86.9 OSTEOMYELITIS OF TOE OF LEFT FOOT (HCC): Chronic | Status: RESOLVED | Noted: 2019-01-31 | Resolved: 2019-10-28

## 2019-10-28 PROBLEM — L97.422 ULCER OF LEFT HEEL, WITH FAT LAYER EXPOSED (HCC): Chronic | Status: RESOLVED | Noted: 2019-08-19 | Resolved: 2019-10-28

## 2019-10-28 PROBLEM — L97.524 ULCERATED, FOOT, LEFT, WITH NECROSIS OF BONE (HCC): Status: RESOLVED | Noted: 2019-08-27 | Resolved: 2019-10-28

## 2019-10-28 PROBLEM — L03.311 ABDOMINAL WALL CELLULITIS: Status: RESOLVED | Noted: 2019-07-08 | Resolved: 2019-10-28

## 2019-10-28 PROBLEM — L97.412 ULCER OF RIGHT HEEL, WITH FAT LAYER EXPOSED (HCC): Chronic | Status: RESOLVED | Noted: 2019-06-24 | Resolved: 2019-10-28

## 2019-10-28 PROBLEM — L97.522 DIABETIC ULCER OF TOE OF LEFT FOOT ASSOCIATED WITH TYPE 2 DIABETES MELLITUS, WITH FAT LAYER EXPOSED (HCC): Status: RESOLVED | Noted: 2019-01-22 | Resolved: 2019-10-28

## 2019-10-28 PROBLEM — L97.509 NON-PRESSURE ULCER OF TOE (HCC): Status: RESOLVED | Noted: 2019-01-22 | Resolved: 2019-10-28

## 2019-10-28 PROBLEM — R14.2 BELCHING: Status: RESOLVED | Noted: 2017-09-11 | Resolved: 2019-10-28

## 2019-10-28 PROBLEM — L97.509 TYPE 2 DIABETES MELLITUS WITH FOOT ULCER, WITH LONG-TERM CURRENT USE OF INSULIN (HCC): Chronic | Status: RESOLVED | Noted: 2019-01-29 | Resolved: 2019-10-28

## 2019-10-28 PROBLEM — E11.69 DIABETIC FOOT ULCER WITH OSTEOMYELITIS (HCC): Status: RESOLVED | Noted: 2019-08-30 | Resolved: 2019-10-28

## 2019-10-28 PROBLEM — L08.9 DIABETIC FOOT INFECTION (HCC): Status: RESOLVED | Noted: 2019-07-08 | Resolved: 2019-10-28

## 2019-10-28 PROBLEM — M86.9 DIABETIC FOOT ULCER WITH OSTEOMYELITIS (HCC): Status: RESOLVED | Noted: 2019-08-30 | Resolved: 2019-10-28

## 2019-10-28 PROBLEM — R13.19 ESOPHAGEAL DYSPHAGIA: Status: RESOLVED | Noted: 2017-09-11 | Resolved: 2019-10-28

## 2019-10-28 PROBLEM — E11.621 TYPE 2 DIABETES MELLITUS WITH FOOT ULCER, WITH LONG-TERM CURRENT USE OF INSULIN (HCC): Chronic | Status: RESOLVED | Noted: 2019-01-29 | Resolved: 2019-10-28

## 2019-10-28 PROBLEM — K30 INDIGESTION: Status: RESOLVED | Noted: 2017-09-11 | Resolved: 2019-10-28

## 2019-10-28 PROBLEM — I97.89 POSTOPERATIVE SURGICAL COMPLICATION INVOLVING CIRCULATORY SYSTEM: Status: RESOLVED | Noted: 2019-09-03 | Resolved: 2019-10-28

## 2019-10-28 PROBLEM — E11.621 DIABETIC ULCER OF TOE OF LEFT FOOT ASSOCIATED WITH TYPE 2 DIABETES MELLITUS, WITH FAT LAYER EXPOSED (HCC): Status: RESOLVED | Noted: 2019-01-22 | Resolved: 2019-10-28

## 2019-10-28 PROBLEM — E11.621 DIABETIC FOOT ULCER WITH OSTEOMYELITIS (HCC): Status: RESOLVED | Noted: 2019-08-30 | Resolved: 2019-10-28

## 2019-10-28 PROBLEM — L03.032 CELLULITIS OF LEFT TOE: Status: RESOLVED | Noted: 2019-01-22 | Resolved: 2019-10-28

## 2019-10-28 PROBLEM — Z89.511 STATUS POST BELOW KNEE AMPUTATION OF RIGHT LOWER EXTREMITY (HCC): Status: RESOLVED | Noted: 2019-09-03 | Resolved: 2019-10-28

## 2019-10-28 PROBLEM — L97.422 MIDFOOT ULCERATION, LEFT, WITH FAT LAYER EXPOSED (HCC): Chronic | Status: RESOLVED | Noted: 2019-06-24 | Resolved: 2019-10-28

## 2019-10-28 LAB
ALBUMIN SERPL-MCNC: 4 G/DL (ref 3.5–5.2)
ALP BLD-CCNC: 69 U/L (ref 40–130)
ALT SERPL-CCNC: 15 U/L (ref 5–41)
ANION GAP SERPL CALCULATED.3IONS-SCNC: 19 MMOL/L (ref 7–19)
AST SERPL-CCNC: 11 U/L (ref 5–40)
BASOPHILS ABSOLUTE: 0 K/UL (ref 0–0.2)
BASOPHILS RELATIVE PERCENT: 0.4 % (ref 0–1)
BILIRUB SERPL-MCNC: 0.3 MG/DL (ref 0.2–1.2)
BUN BLDV-MCNC: 20 MG/DL (ref 6–20)
C-REACTIVE PROTEIN: 0.1 MG/DL (ref 0–0.5)
CALCIUM SERPL-MCNC: 9.4 MG/DL (ref 8.6–10)
CHLORIDE BLD-SCNC: 104 MMOL/L (ref 98–111)
CO2: 19 MMOL/L (ref 22–29)
CREAT SERPL-MCNC: 1.3 MG/DL (ref 0.5–1.2)
EOSINOPHILS ABSOLUTE: 0.5 K/UL (ref 0–0.6)
EOSINOPHILS RELATIVE PERCENT: 6.8 % (ref 0–5)
GFR NON-AFRICAN AMERICAN: >60
GLUCOSE BLD-MCNC: 192 MG/DL (ref 74–109)
HCT VFR BLD CALC: 35.5 % (ref 42–52)
HEMOGLOBIN: 12.2 G/DL (ref 14–18)
IMMATURE GRANULOCYTES #: 0 K/UL
LYMPHOCYTES ABSOLUTE: 2.1 K/UL (ref 1.1–4.5)
LYMPHOCYTES RELATIVE PERCENT: 30.3 % (ref 20–40)
MCH RBC QN AUTO: 30 PG (ref 27–31)
MCHC RBC AUTO-ENTMCNC: 34.4 G/DL (ref 33–37)
MCV RBC AUTO: 87.4 FL (ref 80–94)
MONOCYTES ABSOLUTE: 0.6 K/UL (ref 0–0.9)
MONOCYTES RELATIVE PERCENT: 7.9 % (ref 0–10)
NEUTROPHILS ABSOLUTE: 3.8 K/UL (ref 1.5–7.5)
NEUTROPHILS RELATIVE PERCENT: 54.3 % (ref 50–65)
PDW BLD-RTO: 12.7 % (ref 11.5–14.5)
PLATELET # BLD: 157 K/UL (ref 130–400)
PMV BLD AUTO: 10.6 FL (ref 9.4–12.4)
POTASSIUM SERPL-SCNC: 4.3 MMOL/L (ref 3.5–5)
RBC # BLD: 4.06 M/UL (ref 4.7–6.1)
SEDIMENTATION RATE, ERYTHROCYTE: 31 MM/HR (ref 0–10)
SODIUM BLD-SCNC: 142 MMOL/L (ref 136–145)
TOTAL PROTEIN: 7.4 G/DL (ref 6.6–8.7)
VANCOMYCIN TROUGH: 17.4 UG/ML (ref 10–20)
WBC # BLD: 7 K/UL (ref 4.8–10.8)

## 2019-10-28 PROCEDURE — 99214 OFFICE O/P EST MOD 30 MIN: CPT | Performed by: NURSE PRACTITIONER

## 2019-10-28 RX ORDER — BUSPIRONE HYDROCHLORIDE 5 MG/1
5 TABLET ORAL 3 TIMES DAILY
Qty: 30 TABLET | Refills: 0
Start: 2019-10-28

## 2019-10-28 RX ORDER — SERTRALINE HYDROCHLORIDE 25 MG/1
25 TABLET, FILM COATED ORAL DAILY
Qty: 30 TABLET | Refills: 3 | Status: SHIPPED | OUTPATIENT
Start: 2019-10-28

## 2019-10-28 ASSESSMENT — ENCOUNTER SYMPTOMS
WHEEZING: 0
STRIDOR: 0
EYES NEGATIVE: 1
BACK PAIN: 0
SHORTNESS OF BREATH: 0
GASTROINTESTINAL NEGATIVE: 1
COUGH: 0

## 2019-11-04 ENCOUNTER — TELEPHONE (OUTPATIENT)
Dept: INTERNAL MEDICINE CLINIC | Age: 39
End: 2019-11-04

## 2019-11-10 DIAGNOSIS — I10 ESSENTIAL HYPERTENSION: ICD-10-CM

## 2019-11-10 RX ORDER — AMLODIPINE BESYLATE 5 MG/1
TABLET ORAL
Qty: 30 TABLET | Refills: 1 | Status: SHIPPED | OUTPATIENT
Start: 2019-11-10 | End: 2019-11-17

## 2019-11-14 ENCOUNTER — OFFICE VISIT (OUTPATIENT)
Dept: ENDOCRINOLOGY | Facility: CLINIC | Age: 39
End: 2019-11-14

## 2019-11-14 VITALS
HEIGHT: 76 IN | DIASTOLIC BLOOD PRESSURE: 84 MMHG | HEART RATE: 104 BPM | OXYGEN SATURATION: 96 % | SYSTOLIC BLOOD PRESSURE: 128 MMHG | BODY MASS INDEX: 38.36 KG/M2 | WEIGHT: 315 LBS

## 2019-11-14 DIAGNOSIS — I15.2 HYPERTENSION ASSOCIATED WITH DIABETES (HCC): ICD-10-CM

## 2019-11-14 DIAGNOSIS — E11.65 TYPE 2 DIABETES MELLITUS WITH HYPERGLYCEMIA, WITH LONG-TERM CURRENT USE OF INSULIN (HCC): Primary | ICD-10-CM

## 2019-11-14 DIAGNOSIS — E11.59 HYPERTENSION ASSOCIATED WITH DIABETES (HCC): ICD-10-CM

## 2019-11-14 DIAGNOSIS — Z79.4 TYPE 2 DIABETES MELLITUS WITH HYPERGLYCEMIA, WITH LONG-TERM CURRENT USE OF INSULIN (HCC): Primary | ICD-10-CM

## 2019-11-14 DIAGNOSIS — E11.69 MIXED DIABETIC HYPERLIPIDEMIA ASSOCIATED WITH TYPE 2 DIABETES MELLITUS (HCC): ICD-10-CM

## 2019-11-14 DIAGNOSIS — E78.2 MIXED DIABETIC HYPERLIPIDEMIA ASSOCIATED WITH TYPE 2 DIABETES MELLITUS (HCC): ICD-10-CM

## 2019-11-14 PROCEDURE — 99204 OFFICE O/P NEW MOD 45 MIN: CPT | Performed by: INTERNAL MEDICINE

## 2019-11-14 RX ORDER — DULAGLUTIDE 0.75 MG/.5ML
INJECTION, SOLUTION SUBCUTANEOUS
Refills: 1 | COMMUNITY
Start: 2019-11-02 | End: 2020-01-08 | Stop reason: SDUPTHER

## 2019-11-14 RX ORDER — BUSPIRONE HYDROCHLORIDE 5 MG/1
TABLET ORAL
COMMUNITY
Start: 2019-11-09 | End: 2020-07-15

## 2019-11-14 RX ORDER — LOSARTAN POTASSIUM 50 MG/1
50 TABLET ORAL 2 TIMES DAILY
Refills: 5 | COMMUNITY
Start: 2019-11-02 | End: 2020-07-15

## 2019-11-14 RX ORDER — FLASH GLUCOSE SENSOR
1 KIT MISCELLANEOUS
Qty: 2 EACH | Refills: 11 | Status: SHIPPED | OUTPATIENT
Start: 2019-11-14 | End: 2020-07-15

## 2019-11-14 RX ORDER — INSULIN ASPART INJECTION 100 [IU]/ML
INJECTION, SOLUTION SUBCUTANEOUS
Qty: 9 PEN | Refills: 11 | Status: SHIPPED | OUTPATIENT
Start: 2019-11-14 | End: 2020-07-20 | Stop reason: SDUPTHER

## 2019-11-14 RX ORDER — SERTRALINE HYDROCHLORIDE 25 MG/1
25 TABLET, FILM COATED ORAL DAILY
Refills: 3 | COMMUNITY
Start: 2019-10-28 | End: 2020-07-15

## 2019-11-14 RX ORDER — INSULIN ASPART 100 [IU]/ML
INJECTION, SOLUTION INTRAVENOUS; SUBCUTANEOUS
Refills: 5 | COMMUNITY
Start: 2019-11-03 | End: 2020-07-15

## 2019-11-14 RX ORDER — INSULIN GLARGINE 100 [IU]/ML
70 INJECTION, SOLUTION SUBCUTANEOUS NIGHTLY
Refills: 5 | COMMUNITY
Start: 2019-10-24 | End: 2020-07-15

## 2019-11-14 RX ORDER — PRAVASTATIN SODIUM 20 MG
20 TABLET ORAL EVERY EVENING
Qty: 30 TABLET | Refills: 11 | Status: SHIPPED | OUTPATIENT
Start: 2019-11-14 | End: 2020-11-13

## 2019-11-14 RX ORDER — AMLODIPINE BESYLATE 5 MG/1
5 TABLET ORAL DAILY
COMMUNITY
Start: 2019-11-10 | End: 2021-03-11 | Stop reason: SDUPTHER

## 2019-11-14 NOTE — PROGRESS NOTES
" Andrzej Peters is a 39 y.o. 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     Current symptoms/problems  none     Alleviating Factors: Compliance       Side Effects  metformin    Current diet  in general, a \"healthy\" diet      Current exercise none    Current monitoring regimen: home blood tests - checking 4 x daily   Home blood sugar records: 100    Hypoglycemia none    Past Medical History:   Diagnosis Date   • Hypertension associated with diabetes (CMS/[facility]) 11/14/2019   • Mixed diabetic hyperlipidemia associated with type 2 diabetes mellitus (CMS/[facility]) 11/14/2019   • Type 2 diabetes mellitus with hyperglycemia, with long-term current use of insulin (CMS/HCC) 11/14/2019     Family History   Problem Relation Age of Onset   • Diabetes Mother      Social History     Tobacco Use   • Smoking status: Never Smoker   • Smokeless tobacco: Never Used   Substance Use Topics   • Alcohol use: No     Frequency: Never   • Drug use: No         Current Outpatient Medications:   •  amLODIPine (NORVASC) 5 MG tablet, , Disp: , Rfl:   •  busPIRone (BUSPAR) 5 MG tablet, , Disp: , Rfl:   •  Insulin Glargine (BASAGLAR KWIKPEN) 100 UNIT/ML injection pen, Inject 70 Units under the skin into the appropriate area as directed Every Night., Disp: , Rfl: 5  •  losartan (COZAAR) 50 MG tablet, Take 50 mg by mouth 2 (Two) Times a Day., Disp: , Rfl: 5  •  metoprolol tartrate (LOPRESSOR) 25 MG tablet, Take 25 mg by mouth 2 (Two) Times a Day., Disp: , Rfl: 1  •  NOVOLOG FLEXPEN 100 UNIT/ML solution pen-injector sc pen, Up to 30 units 3 times daily with meals, Disp: , Rfl: 5  •  sertraline (ZOLOFT) 25 MG tablet, Take 25 mg by mouth Daily., Disp: , Rfl: 3  •  TRULICITY 0.75 " "MG/0.5ML solution pen-injector, INJECT 0.5 MLS UNDER THE SKIN EVERY 7 DAYS, Disp: , Rfl: 1    Review of Systems    Review of Systems   Constitutional: Positive for fatigue. Negative for activity change, appetite change, chills, diaphoresis, fever and unexpected weight change.   HENT: Negative for congestion, dental problem, drooling, ear discharge, ear pain, facial swelling, mouth sores, postnasal drip, rhinorrhea, sinus pressure, sore throat, tinnitus, trouble swallowing and voice change.    Eyes: Negative for photophobia, pain, discharge, redness, itching and visual disturbance.   Respiratory: Negative for apnea, cough, choking, chest tightness, shortness of breath, wheezing and stridor.    Cardiovascular: Negative for chest pain, palpitations and leg swelling.   Gastrointestinal: Negative for abdominal distention, abdominal pain, constipation, diarrhea, nausea and vomiting.   Endocrine: Negative for cold intolerance, heat intolerance, polydipsia, polyphagia and polyuria.   Genitourinary: Negative for decreased urine volume, difficulty urinating, dysuria, flank pain, frequency, hematuria and urgency.   Musculoskeletal: Negative for arthralgias, back pain, gait problem, joint swelling, myalgias, neck pain and neck stiffness.   Skin: Negative for color change, pallor, rash and wound.   Allergic/Immunologic: Negative for immunocompromised state.   Neurological: Positive for weakness. Negative for dizziness, tremors, seizures, syncope, facial asymmetry, speech difficulty, light-headedness, numbness and headaches.   Hematological: Negative for adenopathy.   Psychiatric/Behavioral: Negative for agitation, behavioral problems, confusion, decreased concentration, dysphoric mood, hallucinations, self-injury, sleep disturbance and suicidal ideas. The patient is not nervous/anxious and is not hyperactive.         Objective:   /84   Pulse 104   Ht 193 cm (76\")   Wt (!) 165 kg (364 lb 4.8 oz)   SpO2 96%   BMI 44.34 " kg/m²     Physical Exam   Constitutional: He is oriented to person, place, and time. He appears well-developed and well-nourished. He is cooperative.   HENT:   Head: Normocephalic and atraumatic.   Right Ear: External ear normal.   Left Ear: External ear normal.   Nose: Nose normal.   Mouth/Throat: Oropharynx is clear and moist. No oropharyngeal exudate.   Eyes: Conjunctivae and EOM are normal. Pupils are equal, round, and reactive to light. No scleral icterus. Right eye exhibits normal extraocular motion. Left eye exhibits normal extraocular motion.   Neck: Neck supple. No JVD present. No muscular tenderness present. No tracheal deviation, no edema and no erythema present. No thyromegaly present.   Cardiovascular: Normal rate, regular rhythm, normal heart sounds and intact distal pulses. Exam reveals no gallop and no friction rub.   No murmur heard.  Pulmonary/Chest: Effort normal and breath sounds normal. No stridor. No respiratory distress. He has no decreased breath sounds. He has no wheezes. He has no rhonchi. He has no rales. He exhibits no tenderness.   Abdominal: Soft. Bowel sounds are normal. He exhibits no distension and no mass. There is no hepatomegaly. There is no tenderness. There is no rebound and no guarding. No hernia.   Musculoskeletal: Normal range of motion. He exhibits no edema, tenderness or deformity.   Sp left bka    Lymphadenopathy:     He has no cervical adenopathy.   Neurological: He is alert and oriented to person, place, and time. He has normal reflexes. No cranial nerve deficit. He exhibits normal muscle tone. Coordination normal.   Skin: Skin is warm. No rash noted. No erythema. No pallor.   Psychiatric: He has a normal mood and affect. His behavior is normal. Judgment and thought content normal.   Nursing note and vitals reviewed.      Lab Review    Results for orders placed or performed in visit on 09/25/17   Hemoglobin A1c   Result Value Ref Range    Hemoglobin A1C 12.8 %          Assessment/Plan       ICD-10-CM ICD-9-CM   1. Type 2 diabetes mellitus with hyperglycemia, with long-term current use of insulin (CMS/[facility]) E11.65 250.00    Z79.4 790.29     V58.67   2. Hypertension associated with diabetes (CMS/[facility]) E11.59 250.80    I10 401.9   3. Mixed diabetic hyperlipidemia associated with type 2 diabetes mellitus (CMS/HCC) E11.69 250.80    E78.2 272.2         I reviewed and summarized records from Abdirizak Maxwell MD from current year  and I reviewed / ordered labs.   From review of records :        Glycemic Management:   Lab Results   Component Value Date    HGBA1C 8.0 (H) 08/30/2019    HGBA1C 11.5 (H) 07/08/2019    HGBA1C 11.0 (H) 06/24/2019     Lab Results   Component Value Date    GLUCOSE 192 (H) 10/28/2019    BUN 20 10/28/2019    CREATININE 1.3 (H) 10/28/2019    EGFRIFNONA >60 10/28/2019    BCR 12.7 09/16/2017    K 4.3 10/28/2019    CO2 19 (L) 10/28/2019    CALCIUM 9.4 10/28/2019    ALBUMIN 4 10/28/2019    AST 11 10/28/2019    ALT 15 10/28/2019    ANIONGAP 19 10/28/2019     Lab Results   Component Value Date    WBC 7.0 10/28/2019    HGB 12.2 (L) 10/28/2019    HCT 35.5 (L) 10/28/2019    MCV 87.4 10/28/2019     10/28/2019     Basaglar 70 units qhs -- change to tresiba   Increase to 75 , self titation explained    Novolog , doing 22 , change to fiasp  Explained 1:3    Trulicity 0.75 mg weekly , already having nausea    Intolerant to metformin     Add jardiance 10 mg daily       Freestyle angella rx     Lipid Management  Lab Results   Component Value Date    CHOL 251 (H) 09/16/2017     Lab Results   Component Value Date    TRIG 369 (H) 12/03/2018    TRIG 508 (H) 04/11/2018    TRIG 575 (H) 09/16/2017     Lab Results   Component Value Date    HDL 39 (L) 12/03/2018    HDL 44 (L) 04/11/2018    HDL 37 (L) 09/16/2017     No components found for: LDLCALC  Lab Results   Component Value Date     12/03/2018    LDL see below 04/11/2018    LDL  09/16/2017      Comment:      Unable to  calculate     No results found for: LDLDIRECT    The ASCVD Risk score (Searsportpatricia PATEL Jr., et al., 2013) failed to calculate for the following reasons:    The 2013 ASCVD risk score is only valid for ages 40 to 79    Suggest a statin , start pravachol 20 mg qhs     Blood Pressure Management:    Vitals:    11/14/19 1413   BP: 128/84   Pulse: 104   SpO2: 96%     Lab Results   Component Value Date    GLUCOSE 192 (H) 10/28/2019    CALCIUM 9.4 10/28/2019     10/28/2019    K 4.3 10/28/2019    CO2 19 (L) 10/28/2019     10/28/2019    BUN 20 10/28/2019    CREATININE 1.3 (H) 10/28/2019    EGFRIFNONA >60 10/28/2019    BCR 12.7 09/16/2017    ANIONGAP 19 10/28/2019       bp controlled    On losartan, lopressor, norvasc             Microvascular Complication Monitoring:      Eye Exam Evaluation    Within 1 year ,no retinopathy     -----------    Last Microalbumin-Proteinuria Assessment    No results found for: MALBCRERATIO    No results found for: UTPCR    -----------      Neuropathy    None          Weight Related:   Wt Readings from Last 3 Encounters:   11/14/19 (!) 165 kg (364 lb 4.8 oz)     Body mass index is 44.34 kg/m².        Diet interventions: moderate (500 kCal/d) deficit diet.      Bone Health    Lab Results   Component Value Date    CALCIUM 9.4 10/28/2019    JFJH60MD 14.0 (L) 09/16/2017       Thyroid Health    Lab Results   Component Value Date    TSH 2.610 12/03/2018    TSH 0.853 09/16/2017            Other Diabetes Related Aspects       Lab Results   Component Value Date    HCFMXGVA00 494 09/16/2017           Last celiac panel     No results found for: GDPABIGA, TTRANSGLIGA, IGA, DNPTT32RPSZ      No orders of the defined types were placed in this encounter.        A copy of my note was sent to Abdirizak Maxwell MD    Please see my above opinion and suggestions.

## 2019-12-09 DIAGNOSIS — I10 ESSENTIAL HYPERTENSION: ICD-10-CM

## 2019-12-09 RX ORDER — AMLODIPINE BESYLATE 5 MG/1
TABLET ORAL
Qty: 30 TABLET | Refills: 1 | Status: SHIPPED | OUTPATIENT
Start: 2019-12-09 | End: 2020-01-08

## 2020-01-08 RX ORDER — DULAGLUTIDE 0.75 MG/.5ML
0.75 INJECTION, SOLUTION SUBCUTANEOUS WEEKLY
Qty: 2 ML | Refills: 3 | Status: SHIPPED | OUTPATIENT
Start: 2020-01-08 | End: 2020-07-20

## 2020-04-09 RX ORDER — AMLODIPINE BESYLATE 5 MG/1
TABLET ORAL
Qty: 30 TABLET | Refills: 0 | Status: SHIPPED | OUTPATIENT
Start: 2020-04-09 | End: 2020-06-08

## 2020-05-13 ENCOUNTER — VIRTUAL VISIT (OUTPATIENT)
Dept: PRIMARY CARE CLINIC | Age: 40
End: 2020-05-13
Payer: COMMERCIAL

## 2020-05-13 ENCOUNTER — HOSPITAL ENCOUNTER (OUTPATIENT)
Dept: GENERAL RADIOLOGY | Age: 40
Discharge: HOME | End: 2020-05-13
Payer: COMMERCIAL

## 2020-05-13 PROCEDURE — 73630 X-RAY EXAM OF FOOT: CPT

## 2020-05-13 PROCEDURE — 99214 OFFICE O/P EST MOD 30 MIN: CPT | Performed by: NURSE PRACTITIONER

## 2020-05-13 RX ORDER — CLINDAMYCIN HYDROCHLORIDE 300 MG/1
300 CAPSULE ORAL 2 TIMES DAILY
Qty: 14 CAPSULE | Refills: 0 | Status: SHIPPED | OUTPATIENT
Start: 2020-05-13 | End: 2020-05-20

## 2020-05-13 RX ORDER — EMPAGLIFLOZIN 25 MG/1
1 TABLET, FILM COATED ORAL DAILY
Qty: 90 TABLET | Refills: 1
Start: 2020-05-13 | End: 2020-05-26

## 2020-05-13 NOTE — PROGRESS NOTES
\"[]\" Indicates a negative item  -- DELETE ALL ITEMS NOT EXAMINED]  Vital Signs: (As obtained by patient/caregiver at home)        Constitutional: [x] Appears well-developed and well-nourished [x] No apparent distress      [] Abnormal   Mental status  [x] Alert and awake  [x] Oriented to person/place/time [x]Able to follow commands        Eyes:  EOM    [x]  Normal  [] Abnormal-  Sclera  [x]  Normal  [] Abnormal -         Discharge []  None visible  [] Abnormal -    HENT:   [x] Normocephalic, atraumatic. [] Abnormal   [x] Mouth/Throat: Mucous membranes are moist.     External Ears [x] Normal  [] Abnormal-    Neck: [x] No visualized mass     Pulmonary/Chest: [x] Respiratory effort normal.  [x] No visualized signs of difficulty breathing or respiratory distress        [] Abnormal      Musculoskeletal:   [] Normal gait with no signs of ataxia         [x] Normal range of motion of neck        [] Abnormal   Left BKA    Neurological:        [x] No Facial Asymmetry (Cranial nerve 7 motor function) (limited exam to video visit)          [] No gaze palsy        [] Abnormal         Skin:        [] No significant exanthematous lesions or discoloration noted on facial skin         [] Abnormal   Wound to right second toe. NO drainage. redness to toe. NO necrosis noted pre videovisit. Psychiatric:       [x] Normal Affect [] Abnormal        [] No Hallucinations    Other pertinent observable physical exam findings:-    Due to this being a TeleHealth encounter, evaluation of the following organ systems is limited: Vitals/Constitutional/EENT/Resp/CV/GI//MS/Neuro/Skin/Heme-Lymph-Imm. ASSESSMENT/PLAN:  1. Toe infection    - clindamycin (CLEOCIN) 300 MG capsule; Take 1 capsule by mouth 2 times daily for 7 days  Dispense: 14 capsule; Refill: 0  - XR FOOT RIGHT (MIN 3 VIEWS); Future    2.  Type 2 diabetes mellitus with other circulatory complication, with long-term current use of insulin (HCC)    - XR FOOT RIGHT (MIN 3 VIEWS); Future    3. Open wound of toe, initial encounter    - XR FOOT RIGHT (MIN 3 VIEWS); Future    Start Clindamycin    Khurram Bentley in [facility] for wound care if toe worsens. Xray did not show infection:  Impression   1. No destructive bony change identified along the second digit. 2. New patchy sclerosis in the bony elements of the midfoot with new   small bony fragments along the dorsum of the midfoot as well as mild   widening at the Lisfranc joint. 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 Signed by Dr Zahra Red on 5/13/2020 2:53 PM       Return in about 1 week (around 5/20/2020), or if symptoms worsen or fail to improve, for Can see Monday or Tuesday next week- right foot ulcer. An  electronic signature was used to authenticate this note. --ELENA Nolen on 5/14/2020 at 9:25 AM        Pursuant to the emergency declaration under the [facility] waiver authority and the [employer] and [employer] Act, this Virtual  Visit was conducted, with patient's consent, to reduce the patient's risk of exposure to COVID-19 and provide continuity of care for an established patient. Services were provided through a video synchronous discussion virtually to substitute for in-person clinic visit.

## 2020-05-14 ENCOUNTER — TELEPHONE (OUTPATIENT)
Dept: PRIMARY CARE CLINIC | Age: 40
End: 2020-05-14

## 2020-05-14 RX ORDER — INSULIN DEGLUDEC INJECTION 100 U/ML
80 INJECTION, SOLUTION SUBCUTANEOUS NIGHTLY
Qty: 1 PEN | Refills: 0
Start: 2020-05-14

## 2020-05-14 RX ORDER — INSULIN ASPART INJECTION 100 [IU]/ML
5 INJECTION, SOLUTION SUBCUTANEOUS
Qty: 1 PEN | Refills: 0
Start: 2020-05-14

## 2020-05-14 ASSESSMENT — ENCOUNTER SYMPTOMS
COLOR CHANGE: 1
RESPIRATORY NEGATIVE: 1
GASTROINTESTINAL NEGATIVE: 1

## 2020-05-15 RX ORDER — INSULIN ASPART INJECTION 100 [IU]/ML
INJECTION, SOLUTION SUBCUTANEOUS
Qty: 9 PEN | Refills: 11 | OUTPATIENT
Start: 2020-05-15

## 2020-05-19 ENCOUNTER — TELEMEDICINE (OUTPATIENT)
Dept: PRIMARY CARE CLINIC | Age: 40
End: 2020-05-19
Payer: COMMERCIAL

## 2020-05-19 PROCEDURE — 99213 OFFICE O/P EST LOW 20 MIN: CPT | Performed by: NURSE PRACTITIONER

## 2020-05-19 RX ORDER — SODIUM HYPOCHLORITE 1.25 MG/ML
SOLUTION TOPICAL DAILY
Qty: 1 BOTTLE | Refills: 0 | Status: SHIPPED | OUTPATIENT
Start: 2020-05-19 | End: 2020-05-26

## 2020-05-19 RX ORDER — CLINDAMYCIN HYDROCHLORIDE 300 MG/1
300 CAPSULE ORAL 2 TIMES DAILY
Qty: 14 CAPSULE | Refills: 0 | Status: SHIPPED | OUTPATIENT
Start: 2020-05-19 | End: 2020-05-26

## 2020-05-26 ENCOUNTER — TELEMEDICINE (OUTPATIENT)
Dept: PRIMARY CARE CLINIC | Age: 40
End: 2020-05-26
Payer: COMMERCIAL

## 2020-05-26 PROCEDURE — 99213 OFFICE O/P EST LOW 20 MIN: CPT | Performed by: NURSE PRACTITIONER

## 2020-05-26 RX ORDER — EMPAGLIFLOZIN 10 MG/1
10 TABLET, FILM COATED ORAL DAILY
Qty: 90 TABLET | Refills: 1 | Status: SHIPPED | OUTPATIENT
Start: 2020-05-26 | End: 2020-08-24

## 2020-05-26 RX ORDER — DULAGLUTIDE 1.5 MG/.5ML
1.5 INJECTION, SOLUTION SUBCUTANEOUS WEEKLY
Qty: 4 PEN | Refills: 5 | Status: SHIPPED | OUTPATIENT
Start: 2020-05-26

## 2020-05-26 ASSESSMENT — ENCOUNTER SYMPTOMS
RESPIRATORY NEGATIVE: 1
COLOR CHANGE: 0

## 2020-05-26 NOTE — PROGRESS NOTES
[address] [place] [address]     Phone:  [phone]  Fax:  [phone]      2020    TELEHEALTH EVALUATION -- Audio/Visual (During PTUPH-03 public health emergency)    HPI:    Chief Complaint   Patient presents with    Wound Check     Doxy. me video visit. India Reynolds (:  1980) has requested an audio/video evaluation for the following concern(s): Wound on right second toe. Patient states this area is healing well. I was able to see this via doxy, but was unable to take a picture. Area is here with pink wound bed. Patient has 3 more days of clindamycin. He is doing Dakin's dressings twice per day. He denies any swelling or pain in this toe. He does still have a standing wound care order if this area worsens. He states his glucose has been staying over 200. He is in the process of getting an appointment with endocrinology. Due to COVID-19 restrictions he was unable to make his last appointment. He is currently needing medication refills as well. Review of Systems   Constitutional: Negative for fever. Respiratory: Negative. Cardiovascular: Negative for leg swelling. Musculoskeletal: Negative. Skin: Positive for wound. Negative for color change and rash. Prior to Visit Medications    Medication Sig Taking?  Authorizing Provider   Dulaglutide (TRULICITY) 1.5 DW/0.3CR SOPN Inject 1.5 mg into the skin once a week Yes ELENA Arroyo   empagliflozin (JARDIANCE) 10 MG tablet Take 1 tablet by mouth daily Yes ELENA Arroyo   clindamycin (CLEOCIN) 300 MG capsule Take 1 capsule by mouth 2 times daily for 7 days  ELENA Arroyo   sodium hypochlorite (DAKINS) 0.125 % SOLN external solution Apply topically daily for 7 days  ELENA Giang   Insulin Degludec (TRESIBA FLEXTOUCH) 100 UNIT/ML SOPN Inject 80 Units into the skin nightly  ELENA Arroyo   Insulin Aspart, w/Niacinamide, (FIASP FLEXTOUCH) 100 UNIT/ML SOPN Inject 5 Units into the skin 3 times daily (with meals)  ELENA Arroyo   amLODIPine (NORVASC) 5 MG tablet TAKE 1 TABLET BY MOUTH EVERY DAY  ELENA Arroyo   Insulin Pen Needle ([employer] PEN NEEDLES 31G) 31G X 8 MM MISC 1 each by Does not apply route daily  ELENA Arroyo   busPIRone (BUSPAR) 5 MG tablet Take 1 tablet by mouth 3 times daily  ELENA Arroyo   sertraline (ZOLOFT) 25 MG tablet Take 1 tablet by mouth daily  ELENA Arroyo   metoprolol tartrate (LOPRESSOR) 25 MG tablet Take 1 tablet by mouth 2 times daily  ELENA Arroyo   vancomycin (VANCOCIN) Infuse 1,500 mg intravenously every 8 hours  Historical Provider, MD   sodium chloride 0.9 % SOLN 50 mL with ceFEPIme 2 g SOLR 2 g Infuse 2 g intravenously every 12 hours  Historical Provider, MD   vitamin C (ASCORBIC ACID) 500 MG tablet Take 1,000 mg by mouth daily  Historical Provider, MD   Cinnamon 500 MG CAPS Take 1,000 mg by mouth 2 times daily  Historical Provider, MD   Turmeric Curcumin 500 MG CAPS Take 1 capsule by mouth 2 times daily  Historical Provider, MD   pregabalin (LYRICA) 50 MG capsule Take 50 mg by mouth 2 times daily. Historical Provider, MD   losartan (COZAAR) 50 MG tablet Take 1 tablet by mouth 2 times daily  ELENA Arroyo   blood glucose test strips (ONE TOUCH ULTRA TEST) strip Inject 1 each into the skin 3 times daily As needed. ELENA Aldrich   aspirin 81 MG EC tablet Take 1 tablet by mouth daily  Patient not taking: Reported on 10/28/2019  Sunil Yoon MD   Blood Glucose Monitoring Suppl (ONE TOUCH ULTRA 2) w/Device KIT Test fasting daily. Disp 100 strips and 100 lancets.   Priscilla Roberto MD       Social History     Tobacco Use    Smoking status: Never Smoker    Smokeless tobacco: Never Used   Substance Use Topics    Alcohol use: No    Drug use: No        Allergies   Allergen Reactions    Januvia [Sitagliptin] Swelling    Niacin And Related Nausea And Vomiting   ,   Past

## 2020-06-02 ENCOUNTER — VIRTUAL VISIT (OUTPATIENT)
Dept: PRIMARY CARE CLINIC | Age: 40
End: 2020-06-02
Payer: COMMERCIAL

## 2020-06-02 PROCEDURE — 99214 OFFICE O/P EST MOD 30 MIN: CPT | Performed by: NURSE PRACTITIONER

## 2020-06-02 RX ORDER — RAMELTEON 8 MG/1
8 TABLET ORAL NIGHTLY PRN
Qty: 30 TABLET | Refills: 0 | Status: SHIPPED | OUTPATIENT
Start: 2020-06-02 | End: 2020-06-29 | Stop reason: SDUPTHER

## 2020-06-02 RX ORDER — FUROSEMIDE 20 MG/1
20 TABLET ORAL DAILY
Qty: 3 TABLET | Refills: 0 | Status: SHIPPED | OUTPATIENT
Start: 2020-06-02 | End: 2020-06-05

## 2020-06-02 ASSESSMENT — ENCOUNTER SYMPTOMS
BACK PAIN: 0
SINUS PAIN: 0
SHORTNESS OF BREATH: 0
SINUS PRESSURE: 0
ABDOMINAL PAIN: 0
NAUSEA: 0
EYE PAIN: 0
WHEEZING: 0
DIARRHEA: 0
VOMITING: 0
COUGH: 0

## 2020-06-02 NOTE — PROGRESS NOTES
for dysphoric mood. The patient is not nervous/anxious. Prior to Visit Medications    Medication Sig Taking? Authorizing Provider   furosemide (LASIX) 20 MG tablet Take 1 tablet by mouth daily for 3 days Yes ELENA Arroyo   ramelteon (ROZEREM) 8 MG tablet Take 1 tablet by mouth nightly as needed for Sleep Yes ELENA Arroyo   Dulaglutide (TRULICITY) 1.5 QQ/8.3DM SOPN Inject 1.5 mg into the skin once a week  ELENA Arroyo   empagliflozin (JARDIANCE) 10 MG tablet Take 1 tablet by mouth daily  ELENA Arroyo   Insulin Degludec (TRESIBA FLEXTOUCH) 100 UNIT/ML SOPN Inject 80 Units into the skin nightly  ELENA Arroyo   Insulin Aspart, w/Niacinamide, (FIASP FLEXTOUCH) 100 UNIT/ML SOPN Inject 5 Units into the skin 3 times daily (with meals)  ELENA Arroyo   amLODIPine (NORVASC) 5 MG tablet TAKE 1 TABLET BY MOUTH EVERY DAY  ELENA Arroyo   Insulin Pen Needle ([employer] PEN NEEDLES 31G) 31G X 8 MM MISC 1 each by Does not apply route daily  ELENA Arroyo   busPIRone (BUSPAR) 5 MG tablet Take 1 tablet by mouth 3 times daily  ELENA Arroyo   sertraline (ZOLOFT) 25 MG tablet Take 1 tablet by mouth daily  ELENA Arroyo   metoprolol tartrate (LOPRESSOR) 25 MG tablet Take 1 tablet by mouth 2 times daily  ELENA Arroyo   vancomycin (VANCOCIN) Infuse 1,500 mg intravenously every 8 hours  Historical Provider, MD   sodium chloride 0.9 % SOLN 50 mL with ceFEPIme 2 g SOLR 2 g Infuse 2 g intravenously every 12 hours  Historical Provider, MD   vitamin C (ASCORBIC ACID) 500 MG tablet Take 1,000 mg by mouth daily  Historical Provider, MD   Cinnamon 500 MG CAPS Take 1,000 mg by mouth 2 times daily  Historical Provider, MD   Turmeric Curcumin 500 MG CAPS Take 1 capsule by mouth 2 times daily  Historical Provider, MD   pregabalin (LYRICA) 50 MG capsule Take 50 mg by mouth 2 times daily.    Historical Provider, MD   losartan (COZAAR) 50 MG tablet Take 1 tablet by mouth 2 times daily  ELENA Arroyo   blood glucose test strips (ONE TOUCH ULTRA TEST) strip Inject 1 each into the skin 3 times daily As needed. ELENA Che   aspirin 81 MG EC tablet Take 1 tablet by mouth daily  Patient not taking: Reported on 10/28/2019  Sheila Ramsay MD   Blood Glucose Monitoring Suppl (ONE TOUCH ULTRA 2) w/Device KIT Test fasting daily. Disp 100 strips and 100 lancets.   Macie Rico MD       Social History     Tobacco Use    Smoking status: Never Smoker    Smokeless tobacco: Never Used   Substance Use Topics    Alcohol use: No    Drug use: No        Allergies   Allergen Reactions    Januvia [Sitagliptin] Swelling    Niacin And Related Nausea And Vomiting   ,   Past Medical History:   Diagnosis Date    Diabetes mellitus ([facility] [address]     Disease of blood and blood forming organ     GERD (gastroesophageal reflux disease)     Hyperlipidemia     Hypertension     Reactive depression 10/28/2019    Sleep apnea     no longer present with weight loss 10-5-17   ,   Past Surgical History:   Procedure Laterality Date    COLONOSCOPY      ENDOSCOPY, COLON, DIAGNOSTIC      INCISION AND DRAINAGE Bilateral 7/9/2019    NON-EXCISIONAL DEBRIDEMENT OF RIGHT MEDIAL HEEL WOUND 2.6CM X 1.6CM; NON-EXCISIONAL DEBRIDEMENT OF RIGHT LATERAL HEEL WOUND 3CM X 1CM; EXCISIONAL DEBRIDEMENT OF SKIN AND SUBCUTANEOUS TISSUE LEFT HEEL WOUND 5CM X 2.5CM; EXCISIONAL DEBRIDEMENT OF SKIN, SUBCUTANEOUS TISSUE, AND TENDON LEFT DISTAL FOOT 9CM X 5CM performed by Rosendo Aquino MD at [address] [place] [address] And [address] [place] [address] 7/11/2019    NON-EXCISIONAL DEBRIDEMENT OF RIGHT MEDIAL HEEL WOUND 2 X 1CM; NON-EXCISIONAL DEBRIDEMENT OF RIGHT HEEL WOUND 3 X 1CM; NON-EXCISIONAL DEBRIDEMENT OF LEFT HEEL WOUND 4 X 2CM performed by Rosendo Aquino MD at [address] [place] [address] 9/3/2019    WASHOUT BELOW KNEE AMPUTATION performed by Rosendo Aquino MD at [address] [place] [address] 8/30/2019    LEFT BELOW KNEE AMPUTATION performed by Adriana Price MD at [address] [facility] [place] EGD TRANSORAL BIOPSY SINGLE/MULTIPLE N/A 10/5/2017    Dr Taylor-w/dilation over wire, 47 [place]-esophageal stricture-Faith (-) chronic inflammation    TOE AMPUTATION Left 1/31/2019    TJR. Left second toe amputation at the metatarsophalangeal.    TOE AMPUTATION Left 7/11/2019    AMPUTATION LEFT FIFITH TOE performed by Adriana Price MD at [facility]     ,   Family History   Problem Relation Age of Onset    Cancer Father         lung, thyroid    High Blood Pressure Father     Colon Cancer Maternal Uncle     Diabetes Mother     Colon Polyps Neg Hx     Liver Cancer Neg Hx     Liver Disease Neg Hx     Esophageal Cancer Neg Hx     Retinal Detachment Neg Hx     Stomach Cancer Neg Hx     Rectal Cancer Neg Hx        PHYSICAL EXAMINATION:  [ INSTRUCTIONS:  \"[x]\" Indicates a positive item  \"[]\" Indicates a negative item  -- DELETE ALL ITEMS NOT EXAMINED]  Vital Signs: (As obtained by patient/caregiver at home)        Constitutional: [x] Appears well-developed and well-nourished [x] No apparent distress      [] Abnormal   Mental status  [x] Alert and awake  [x] Oriented to person/place/time [x]Able to follow commands        Eyes:  EOM    [x]  Normal  [] Abnormal-  Sclera  [x]  Normal  [] Abnormal -         Discharge []  None visible  [] Abnormal -    HENT:   [x] Normocephalic, atraumatic.   [] Abnormal   [x] Mouth/Throat: Mucous membranes are moist.     External Ears [x] Normal  [] Abnormal-    Neck: [x] No visualized mass     Pulmonary/Chest: [x] Respiratory effort normal.  [x] No visualized signs of difficulty breathing or respiratory distress        [] Abnormal      Musculoskeletal:   [x] Normal gait with no signs of ataxia         [x] Normal range of motion of neck        [] Abnormal       Neurological:        [x] No Facial Asymmetry (Cranial nerve 7 motor function) (limited exam to video

## 2020-06-08 RX ORDER — AMLODIPINE BESYLATE 5 MG/1
TABLET ORAL
Qty: 30 TABLET | Refills: 0 | Status: SHIPPED | OUTPATIENT
Start: 2020-06-08 | End: 2020-07-05

## 2020-06-29 RX ORDER — RAMELTEON 8 MG/1
8 TABLET ORAL NIGHTLY PRN
Qty: 30 TABLET | Refills: 0 | Status: SHIPPED | OUTPATIENT
Start: 2020-06-29 | End: 2020-07-29

## 2020-07-13 ENCOUNTER — HOSPITAL ENCOUNTER (OUTPATIENT)
Dept: GENERAL RADIOLOGY | Facility: HOSPITAL | Age: 40
Discharge: HOME | End: 2020-07-13
Admitting: PODIATRIST

## 2020-07-13 ENCOUNTER — LAB (OUTPATIENT)
Dept: LAB | Facility: HOSPITAL | Age: 40
End: 2020-07-13

## 2020-07-13 ENCOUNTER — TRANSCRIBE ORDERS (OUTPATIENT)
Dept: ADMINISTRATIVE | Facility: HOSPITAL | Age: 40
End: 2020-07-13

## 2020-07-13 ENCOUNTER — LAB REQUISITION (OUTPATIENT)
Dept: LAB | Facility: HOSPITAL | Age: 40
End: 2020-07-13

## 2020-07-13 ENCOUNTER — OFFICE VISIT (OUTPATIENT)
Dept: WOUND CARE | Facility: HOSPITAL | Age: 40
End: 2020-07-13

## 2020-07-13 DIAGNOSIS — M85.80 SAPHO SYNDROME (HCC): ICD-10-CM

## 2020-07-13 DIAGNOSIS — E11.621 DIABETIC FOOT ULCER WITH OSTEOMYELITIS (HCC): ICD-10-CM

## 2020-07-13 DIAGNOSIS — M65.9 SAPHO SYNDROME (HCC): ICD-10-CM

## 2020-07-13 DIAGNOSIS — L97.509 DIABETIC FOOT ULCER WITH OSTEOMYELITIS (HCC): ICD-10-CM

## 2020-07-13 DIAGNOSIS — M86.9 SAPHO SYNDROME (HCC): ICD-10-CM

## 2020-07-13 DIAGNOSIS — M65.9 SAPHO SYNDROME (HCC): Primary | ICD-10-CM

## 2020-07-13 DIAGNOSIS — L70.9 SAPHO SYNDROME (HCC): Primary | ICD-10-CM

## 2020-07-13 DIAGNOSIS — Z00.00 ENCOUNTER FOR GENERAL ADULT MEDICAL EXAMINATION WITHOUT ABNORMAL FINDINGS: ICD-10-CM

## 2020-07-13 DIAGNOSIS — L40.3 SAPHO SYNDROME (HCC): ICD-10-CM

## 2020-07-13 DIAGNOSIS — E11.69 DIABETIC FOOT ULCER WITH OSTEOMYELITIS (HCC): ICD-10-CM

## 2020-07-13 DIAGNOSIS — M86.9 SAPHO SYNDROME (HCC): Primary | ICD-10-CM

## 2020-07-13 DIAGNOSIS — M86.9 DIABETIC FOOT ULCER WITH OSTEOMYELITIS (HCC): ICD-10-CM

## 2020-07-13 DIAGNOSIS — L40.3 SAPHO SYNDROME (HCC): Primary | ICD-10-CM

## 2020-07-13 DIAGNOSIS — L70.9 SAPHO SYNDROME (HCC): ICD-10-CM

## 2020-07-13 DIAGNOSIS — M85.80 SAPHO SYNDROME (HCC): Primary | ICD-10-CM

## 2020-07-13 LAB
ALBUMIN SERPL-MCNC: 3.7 G/DL (ref 3.5–5.2)
ALBUMIN/GLOB SERPL: 1 G/DL
ALP SERPL-CCNC: 90 U/L (ref 39–117)
ALT SERPL W P-5'-P-CCNC: 9 U/L (ref 1–41)
ANION GAP SERPL CALCULATED.3IONS-SCNC: 10 MMOL/L (ref 5–15)
AST SERPL-CCNC: 8 U/L (ref 1–40)
AVERAGE GLUCOSE: NORMAL
BASOPHILS # BLD AUTO: 0.04 10*3/MM3 (ref 0–0.2)
BASOPHILS NFR BLD AUTO: 0.5 % (ref 0–1.5)
BILIRUB SERPL-MCNC: 0.3 MG/DL (ref 0–1.2)
BUN SERPL-MCNC: 17 MG/DL (ref 6–20)
BUN/CREAT SERPL: 12.7 (ref 7–25)
CALCIUM SPEC-SCNC: 9.2 MG/DL (ref 8.6–10.5)
CHLORIDE SERPL-SCNC: 107 MMOL/L (ref 98–107)
CO2 SERPL-SCNC: 25 MMOL/L (ref 22–29)
CREAT SERPL-MCNC: 1.34 MG/DL (ref 0.76–1.27)
DEPRECATED RDW RBC AUTO: 42.1 FL (ref 37–54)
EOSINOPHIL # BLD AUTO: 0.47 10*3/MM3 (ref 0–0.4)
EOSINOPHIL NFR BLD AUTO: 5.5 % (ref 0.3–6.2)
ERYTHROCYTE [DISTWIDTH] IN BLOOD BY AUTOMATED COUNT: 13 % (ref 12.3–15.4)
GFR SERPL CREATININE-BSD FRML MDRD: 59 ML/MIN/1.73
GLOBULIN UR ELPH-MCNC: 3.6 GM/DL
GLUCOSE SERPL-MCNC: 158 MG/DL (ref 65–99)
HBA1C MFR BLD: 7 %
HCT VFR BLD AUTO: 37.3 % (ref 37.5–51)
HGB BLD-MCNC: 12.6 G/DL (ref 13–17.7)
IMM GRANULOCYTES # BLD AUTO: 0.03 10*3/MM3 (ref 0–0.05)
IMM GRANULOCYTES NFR BLD AUTO: 0.4 % (ref 0–0.5)
LYMPHOCYTES # BLD AUTO: 2.11 10*3/MM3 (ref 0.7–3.1)
LYMPHOCYTES NFR BLD AUTO: 24.6 % (ref 19.6–45.3)
MCH RBC QN AUTO: 30.1 PG (ref 26.6–33)
MCHC RBC AUTO-ENTMCNC: 33.8 G/DL (ref 31.5–35.7)
MCV RBC AUTO: 89 FL (ref 79–97)
MONOCYTES # BLD AUTO: 0.5 10*3/MM3 (ref 0.1–0.9)
MONOCYTES NFR BLD AUTO: 5.8 % (ref 5–12)
NEUTROPHILS NFR BLD AUTO: 5.42 10*3/MM3 (ref 1.7–7)
NEUTROPHILS NFR BLD AUTO: 63.2 % (ref 42.7–76)
NRBC BLD AUTO-RTO: 0 /100 WBC (ref 0–0.2)
PLATELET # BLD AUTO: 176 10*3/MM3 (ref 140–450)
PMV BLD AUTO: 11.1 FL (ref 6–12)
POTASSIUM SERPL-SCNC: 4.9 MMOL/L (ref 3.5–5.2)
PROT SERPL-MCNC: 7.3 G/DL (ref 6–8.5)
RBC # BLD AUTO: 4.19 10*6/MM3 (ref 4.14–5.8)
SODIUM SERPL-SCNC: 142 MMOL/L (ref 136–145)
WBC # BLD AUTO: 8.57 10*3/MM3 (ref 3.4–10.8)

## 2020-07-13 PROCEDURE — 85652 RBC SED RATE AUTOMATED: CPT | Performed by: PODIATRIST

## 2020-07-13 PROCEDURE — G0463 HOSPITAL OUTPT CLINIC VISIT: HCPCS

## 2020-07-13 PROCEDURE — 88311 DECALCIFY TISSUE: CPT | Performed by: PODIATRIST

## 2020-07-13 PROCEDURE — 84134 ASSAY OF PREALBUMIN: CPT | Performed by: PODIATRIST

## 2020-07-13 PROCEDURE — 73660 X-RAY EXAM OF TOE(S): CPT

## 2020-07-13 PROCEDURE — 88307 TISSUE EXAM BY PATHOLOGIST: CPT | Performed by: PODIATRIST

## 2020-07-13 PROCEDURE — 11044 DBRDMT BONE 1ST 20 SQ CM/<: CPT | Performed by: PODIATRIST

## 2020-07-13 PROCEDURE — 87176 TISSUE HOMOGENIZATION CULTR: CPT | Performed by: PODIATRIST

## 2020-07-13 PROCEDURE — 99204 OFFICE O/P NEW MOD 45 MIN: CPT | Performed by: PODIATRIST

## 2020-07-13 PROCEDURE — 87075 CULTR BACTERIA EXCEPT BLOOD: CPT | Performed by: PODIATRIST

## 2020-07-13 PROCEDURE — 36415 COLL VENOUS BLD VENIPUNCTURE: CPT

## 2020-07-13 PROCEDURE — 87070 CULTURE OTHR SPECIMN AEROBIC: CPT | Performed by: PODIATRIST

## 2020-07-13 PROCEDURE — 87147 CULTURE TYPE IMMUNOLOGIC: CPT | Performed by: PODIATRIST

## 2020-07-13 PROCEDURE — 85025 COMPLETE CBC W/AUTO DIFF WBC: CPT | Performed by: PODIATRIST

## 2020-07-13 PROCEDURE — 87186 SC STD MICRODIL/AGAR DIL: CPT | Performed by: PODIATRIST

## 2020-07-13 PROCEDURE — 83036 HEMOGLOBIN GLYCOSYLATED A1C: CPT | Performed by: PODIATRIST

## 2020-07-13 PROCEDURE — 80053 COMPREHEN METABOLIC PANEL: CPT | Performed by: PODIATRIST

## 2020-07-13 PROCEDURE — 87205 SMEAR GRAM STAIN: CPT | Performed by: PODIATRIST

## 2020-07-13 PROCEDURE — 86140 C-REACTIVE PROTEIN: CPT | Performed by: PODIATRIST

## 2020-07-14 LAB
CRP SERPL-MCNC: 1.15 MG/DL (ref 0–0.5)
ERYTHROCYTE [SEDIMENTATION RATE] IN BLOOD: 66 MM/HR (ref 0–15)
HBA1C MFR BLD: 7 % (ref 4.8–5.6)
PREALB SERPL-MCNC: 32 MG/DL (ref 20–40)

## 2020-07-15 ENCOUNTER — APPOINTMENT (OUTPATIENT)
Dept: PREADMISSION TESTING | Facility: HOSPITAL | Age: 40
End: 2020-07-15

## 2020-07-15 ENCOUNTER — TRANSCRIBE ORDERS (OUTPATIENT)
Dept: ADMINISTRATIVE | Facility: HOSPITAL | Age: 40
End: 2020-07-15

## 2020-07-15 VITALS
DIASTOLIC BLOOD PRESSURE: 89 MMHG | OXYGEN SATURATION: 93 % | BODY MASS INDEX: 38.36 KG/M2 | HEART RATE: 116 BPM | HEIGHT: 76 IN | WEIGHT: 315 LBS | SYSTOLIC BLOOD PRESSURE: 154 MMHG | RESPIRATION RATE: 20 BRPM

## 2020-07-15 DIAGNOSIS — Z01.818 PRE-OP TESTING: Primary | ICD-10-CM

## 2020-07-15 LAB
CYTO UR: NORMAL
LAB AP CASE REPORT: NORMAL
LAB AP CLINICAL INFORMATION: NORMAL
PATH REPORT.FINAL DX SPEC: NORMAL
PATH REPORT.GROSS SPEC: NORMAL

## 2020-07-15 PROCEDURE — 93005 ELECTROCARDIOGRAM TRACING: CPT

## 2020-07-15 PROCEDURE — 93010 ELECTROCARDIOGRAM REPORT: CPT | Performed by: INTERNAL MEDICINE

## 2020-07-15 PROCEDURE — C9803 HOPD COVID-19 SPEC COLLECT: HCPCS | Performed by: PODIATRIST

## 2020-07-15 PROCEDURE — 87635 SARS-COV-2 COVID-19 AMP PRB: CPT | Performed by: PODIATRIST

## 2020-07-15 RX ORDER — DOXYCYCLINE HYCLATE 100 MG/1
100 CAPSULE ORAL 2 TIMES DAILY
COMMUNITY
Start: 2020-07-13 | End: 2020-07-17 | Stop reason: HOSPADM

## 2020-07-15 RX ORDER — RAMELTEON 8 MG/1
2 TABLET ORAL NIGHTLY PRN
COMMUNITY

## 2020-07-15 NOTE — DISCHARGE INSTRUCTIONS
DAY OF SURGERY INSTRUCTIONS        YOUR SURGEON: Dr. El    PROCEDURE: Amputation Partial Versus Total Second Digit Right Foot    DATE OF SURGERY: July 16, 2020    ARRIVAL TIME: AS DIRECTED BY OFFICE    YOU MAY TAKE THE FOLLOWING MEDICATION(S) THE MORNING OF SURGERY WITH A SIP OF WATER: none      ALL OTHER HOME MEDICATION CHECK WITH YOUR PHYSICIAN      DO NOT TAKE ANY ERECTILE DYSFUNCTION MEDICATIONS (EX: CIALIS, VIAGRA) 24 HOURS PRIOR TO SURGERY                      MANAGING PAIN AFTER SURGERY    We know you are probably wondering what your pain will be like after surgery.  Following surgery it is unrealistic to expect you will not have pain.   Pain is how our bodies let us know that something is wrong or cautions us to be careful.  That said, our goal is to make your pain tolerable.    Methods we may use to treat your pain include (oral or IV medications, PCAs, epidurals, nerve blocks, etc.)   While some procedures require IV pain medications for a short time after surgery, transitioning to pain medications by mouth allows for better management of pain.   Your nurse will encourage you to take oral pain medications whenever possible.  IV medications work almost immediately, but only last a short while.  Taking medications by mouth allows for a more constant level of medication in your blood stream for a longer period of time.      Once your pain is out of control it is harder to get back under control.  It is important you are aware when your next dose of pain medication is due.  If you are admitted, your nurse may write the time of your next dose on the white board in your room to help you remember.      We are interested in your pain and encourage you to inform us about aggravating factors during your visit.   Many times a simple repositioning every few hours can make a big difference.    If your physician says it is okay, do not let your pain prevent you from getting out of bed. Be sure to call your nurse  for assistance prior to getting up so you do not fall.      Before surgery, please decide your tolerable pain goal.  These faces help describe the pain ratings we use on a 0-10 scale.   Be prepared to tell us your goal and whether or not you take pain or anxiety medications at home.          BEFORE YOU COME TO THE HOSPITAL  (Pre-op instructions)  • Do not eat, drink, smoke or chew gum after midnight the night before surgery.  This also includes no mints.  • Morning of surgery take only the medicines you have been instructed with a sip of water unless otherwise instructed  by your physician.  • Do not shave, wear makeup or dark nail polish.  • Remove all jewelry including rings.  • Leave anything you consider valuable at home.  • Leave your suitcase in the car until after your surgery.  • Bring the following with you if applicable:  o Picture ID and insurance, Medicare or Medicaid cards  o Co-pay/deductible required by insurance (cash, check, credit card)  o Copy of advance directive, living will or power-of- documents if not brought to PAT  o CPAP or BIPAP mask and tubing  o Relaxation aids ( book, magazine), etc.  o Hearing aids                        ON THE DAY OF SURGERY  · On the day of surgery check in at registration located at the main entrance of the hospital.   ? You will be registered and given a beeper with instructions where to wait in the main lobby.  ? When your beeper lights up and vibrates a member of the Outpatient Surgery staff will meet you at the double doors under the stair steps and escort you to your preoperative room.   · You may have cloth compression devices placed on your legs. These help to prevent blood clots and reduce swelling in your legs.  · An IV may be inserted into one of your veins.  · In the operating room, you may be given one or more of the following:  ? A medicine to help you relax (sedative).  ? A medicine to numb the area (local anesthetic).  ? A medicine to make  "you fall asleep (general anesthetic).  ? A medicine that is injected into an area of your body to numb everything below the injection site (regional anesthetic).  · Your surgical site will be marked or identified.  · You may be given an antibiotic through your IV to help prevent infection.  Contact a health care provider if you:  · Develop a fever of more than 100.4°F (38°C) or other feelings of illness during the 48 hours before your surgery.  · Have symptoms that get worse.  Have questions or concerns about your surgery    General Anesthesia/Surgery, Adult  General anesthesia is the use of medicines to make a person \"go to sleep\" (unconscious) for a medical procedure. General anesthesia must be used for certain procedures, and is often recommended for procedures that:  · Last a long time.  · Require you to be still or in an unusual position.  · Are major and can cause blood loss.  The medicines used for general anesthesia are called general anesthetics. As well as making you unconscious for a certain amount of time, these medicines:  · Prevent pain.  · Control your blood pressure.  · Relax your muscles.  Tell a health care provider about:  · Any allergies you have.  · All medicines you are taking, including vitamins, herbs, eye drops, creams, and over-the-counter medicines.  · Any problems you or family members have had with anesthetic medicines.  · Types of anesthetics you have had in the past.  · Any blood disorders you have.  · Any surgeries you have had.  · Any medical conditions you have.  · Any recent upper respiratory, chest, or ear infections.  · Any history of:  ? Heart or lung conditions, such as heart failure, sleep apnea, asthma, or chronic obstructive pulmonary disease (COPD).  ?  service.  ? Depression or anxiety.  · Any tobacco or drug use, including marijuana or alcohol use.  · Whether you are pregnant or may be pregnant.  What are the risks?  Generally, this is a safe procedure. However, " problems may occur, including:  · Allergic reaction.  · Lung and heart problems.  · Inhaling food or liquid from the stomach into the lungs (aspiration).  · Nerve injury.  · Air in the bloodstream, which can lead to stroke.  · Extreme agitation or confusion (delirium) when you wake up from the anesthetic.  · Waking up during your procedure and being unable to move. This is rare.  These problems are more likely to develop if you are having a major surgery or if you have an advanced or serious medical condition. You can prevent some of these complications by answering all of your health care provider's questions thoroughly and by following all instructions before your procedure.  General anesthesia can cause side effects, including:  · Nausea or vomiting.  · A sore throat from the breathing tube.  · Hoarseness.  · Wheezing or coughing.  · Shaking chills.  · Tiredness.  · Body aches.  · Anxiety.  · Sleepiness or drowsiness.  · Confusion or agitation.  RISKS AND COMPLICATIONS OF SURGERY  Your health care provider will discuss possible risks and complications with you before surgery. Common risks and complications include:    · Problems due to the use of anesthetics.  · Blood loss and replacement (does not apply to minor surgical procedures).  · Temporary increase in pain due to surgery.  · Uncorrected pain or problems that the surgery was meant to correct.  · Infection.  · New damage.    What happens before the procedure?    Medicines  Ask your health care provider about:  · Changing or stopping your regular medicines. This is especially important if you are taking diabetes medicines or blood thinners.  · Taking medicines such as aspirin and ibuprofen. These medicines can thin your blood. Do not take these medicines unless your health care provider tells you to take them.  · Taking over-the-counter medicines, vitamins, herbs, and supplements. Do not take these during the week before your procedure unless your health  care provider approves them.  General instructions  · Starting 3-6 weeks before the procedure, do not use any products that contain nicotine or tobacco, such as cigarettes and e-cigarettes. If you need help quitting, ask your health care provider.  · If you brush your teeth on the morning of the procedure, make sure to spit out all of the toothpaste.  · Tell your health care provider if you become ill or develop a cold, cough, or fever.  · If instructed by your health care provider, bring your sleep apnea device with you on the day of your surgery (if applicable).  · Ask your health care provider if you will be going home the same day, the following day, or after a longer hospital stay.  ? Plan to have someone take you home from the hospital or clinic.  ? Plan to have a responsible adult care for you for at least 24 hours after you leave the hospital or clinic. This is important.  What happens during the procedure?  · You will be given anesthetics through both of the following:  ? A mask placed over your nose and mouth.  ? An IV in one of your veins.  · You may receive a medicine to help you relax (sedative).  · After you are unconscious, a breathing tube may be inserted down your throat to help you breathe. This will be removed before you wake up.  · An anesthesia specialist will stay with you throughout your procedure. He or she will:  ? Keep you comfortable and safe by continuing to give you medicines and adjusting the amount of medicine that you get.  ? Monitor your blood pressure, pulse, and oxygen levels to make sure that the anesthetics do not cause any problems.  The procedure may vary among health care providers and hospitals.  What happens after the procedure?  · Your blood pressure, temperature, heart rate, breathing rate, and blood oxygen level will be monitored until the medicines you were given have worn off.  · You will wake up in a recovery area. You may wake up slowly.  · If you feel anxious or  agitated, you may be given medicine to help you calm down.  · If you will be going home the same day, your health care provider may check to make sure you can walk, drink, and urinate.  · Your health care provider will treat any pain or side effects you have before you go home.  · Do not drive for 24 hours if you were given a sedative.  Summary  · General anesthesia is used to keep you still and prevent pain during a procedure.  · It is important to tell your healthcare provider about your medical history and any surgeries you have had, and previous experience with anesthesia.  · Follow your healthcare provider’s instructions about when to stop eating, drinking, or taking certain medicines before your procedure.  · Plan to have someone take you home from the hospital or clinic.  This information is not intended to replace advice given to you by your health care provider. Make sure you discuss any questions you have with your health care provider.  Document Released: 03/26/2009 Document Revised: 08/03/2018 Document Reviewed: 08/03/2018  [employer] Interactive Patient Education © 2019 [employer]       Fall Prevention in Hospitals, Adult  As a hospital patient, your condition and the treatments you receive can increase your risk for falls. Some additional risk factors for falls in a hospital include:  · Being in an unfamiliar environment.  · Being on bed rest.  · Your surgery.  · Taking certain medicines.  · Your tubing requirements, such as intravenous (IV) therapy or catheters.  It is important that you learn how to decrease fall risks while at the hospital. Below are important tips that can help prevent falls.  SAFETY TIPS FOR PREVENTING FALLS  Talk about your risk of falling.  · Ask your health care provider why you are at risk for falling. Is it your medicine, illness, tubing placement, or something else?  · Make a plan with your health care provider to keep you safe from falls.  · Ask your health care provider  or pharmacist about side effects of your medicines. Some medicines can make you dizzy or affect your coordination.  Ask for help.  · Ask for help before getting out of bed. You may need to press your call button.  · Ask for assistance in getting safely to the toilet.  · Ask for a walker or cane to be put at your bedside. Ask that most of the side rails on your bed be placed up before your health care provider leaves the room.  · Ask family or friends to sit with you.  · Ask for things that are out of your reach, such as your glasses, hearing aids, telephone, bedside table, or call button.  Follow these tips to avoid falling:  · Stay lying or seated, rather than standing, while waiting for help.  · Wear rubber-soled slippers or shoes whenever you walk in the hospital.  · Avoid quick, sudden movements.  ¨ Change positions slowly.  ¨ Sit on the side of your bed before standing.  ¨ Stand up slowly and wait before you start to walk.  · Let your health care provider know if there is a spill on the floor.  · Pay careful attention to the medical equipment, electrical cords, and tubes around you.  · When you need help, use your call button by your bed or in the bathroom. Wait for one of your health care providers to help you.  · If you feel dizzy or unsure of your footing, return to bed and wait for assistance.  · Avoid being distracted by the TV, telephone, or another person in your room.  · Do not lean or support yourself on rolling objects, such as IV poles or bedside tables.     This information is not intended to replace advice given to you by your health care provider. Make sure you discuss any questions you have with your health care provider.     Document Released: 12/15/2001 Document Revised: 01/08/2016 Document Reviewed: 08/25/2013  [employer] Interactive Patient Education ©2016 [employer]       [facility]  CHG 4% Patient Instruction Sheet    Chlorhexidine Before Surgery  Chlorhexidine gluconate (CHG)  is a germ-killing (antiseptic) solution that is used to clean the skin. It gets rid of the bacteria that normally live on the skin. Cleaning your skin with CHG before surgery helps lower the risk for infection after surgery.    How to use CHG solution  · You will take 2 showers, one shower the night before surgery, the second shower the morning of surgery before coming to the hospital.  · Use CHG only as told by your health care provider, and follow the instructions on the label.  · Use CHG solution while taking a shower. Follow these steps when using CHG solution (unless your health care provider gives you different instructions):  1. Start the shower.  2. Use your normal soap and shampoo to wash your face and hair.  3. Turn off the shower or move out of the shower stream.  4. Pour the CHG onto a clean washcloth. Do not use any type of brush or rough-edged sponge.  5. Starting at your neck, lather your body down to your toes. Make sure you:  6. Pay special attention to the part of your body where you will be having surgery. Scrub this area for at least 1 minute.  7. Use the full amount of CHG as directed. Usually, this is one half bottle for each shower.  8. Do not use CHG on your head or face. If the solution gets into your ears or eyes, rinse them well with water.  9. Avoid your genital area.  10. Avoid any areas of skin that have broken skin, cuts, or scrapes.  11. Scrub your back and under your arms. Make sure to wash skin folds.  12. Let the lather sit on your skin for 1-2 minutes or as long as told by your health care  provider.  13. Thoroughly rinse your entire body in the shower. Make sure that all body creases and crevices are rinsed well.  14. Dry off with a clean towel. Do not put any substances on your body afterward, such as powder, lotion, or perfume.  15. Put on clean clothes or pajamas.  16. If it is the night before your surgery, sleep in clean sheets.    What are the risks?  Risks of using CHG  include:  · A skin reaction.  · Hearing loss, if CHG gets in your ears.  · Eye injury, if CHG gets in your eyes and is not rinsed out.  · The CHG product catching fire.  Make sure that you avoid smoking and flames after applying CHG to your skin.  Do not use CHG:  · If you have a chlorhexidine allergy or have previously reacted to chlorhexidine.  · On babies younger than 2 months of age.      On the day of surgery, when you are taken to your room in Outpatient Surgery you will be given a CHG prepackaged cloth to wipe the site for your surgery.  How to use CHG prepackaged cloths  · Follow the instructions on the label.  · Use the CHG cloth on clean, dry skin. Follow these steps when using a CHG cloth (unless your health care provider gives you different instructions):  1. Using the CHG cloth, vigorously scrub the part of your body where you will be having surgery. Scrub using a back-and-forth motion for 3 minutes. The area on your body should be completely wet with CHG when you are finished scrubbing.  2. Do not rinse. Discard the cloth and let the area air-dry for 1 minute. Do not put any substances on your body afterward, such as powder, lotion, or perfume.  Contact a health care provider if:  · Your skin gets irritated after scrubbing.  · You have questions about using your solution or cloth.  Get help right away if:  · Your eyes become very red or swollen.  · Your eyes itch badly.  · Your skin itches badly and is red or swollen.  · Your hearing changes.  · You have trouble seeing.  · You have swelling or tingling in your mouth or throat.  · You have trouble breathing.  · You swallow any chlorhexidine.  Summary  · Chlorhexidine gluconate (CHG) is a germ-killing (antiseptic) solution that is used to clean the skin. Cleaning your skin with CHG before surgery helps lower the risk for infection after surgery.  · You may be given CHG to use at home. It may be in a bottle or in a prepackaged cloth to use on your skin.  Carefully follow your health care provider's instructions and the instructions on the product label.  · Do not use CHG if you have a chlorhexidine allergy.  · Contact your health care provider if your skin gets irritated after scrubbing.  This information is not intended to replace advice given to you by your health care provider. Make sure you discuss any questions you have with your health care provider.  Document Released: 09/11/2013 Document Revised: 11/15/2018 Document Reviewed: 11/15/2018  [employer] Interactive Patient Education © 2019 [employer]          PATIENT/FAMILY/RESPONSIBLE PARTY VERBALIZES UNDERSTANDING OF ABOVE EDUCATION.  COPY OF PAIN SCALE GIVEN AND REVIEWED WITH VERBALIZED UNDERSTANDING.PATIENT/FAMILY/RESPONSIBLE PARTY VERBALIZES UNDERSTANDING OF ABOVE EDUCATION.  COPY OF PAIN SCALE GIVEN AND REVIEWED WITH VERBALIZED UNDERSTANDING.

## 2020-07-16 ENCOUNTER — ANESTHESIA (OUTPATIENT)
Dept: PERIOP | Facility: HOSPITAL | Age: 40
End: 2020-07-16

## 2020-07-16 ENCOUNTER — HOSPITAL ENCOUNTER (OUTPATIENT)
Facility: HOSPITAL | Age: 40
Discharge: HOME | End: 2020-07-17
Attending: PODIATRIST | Admitting: PODIATRIST

## 2020-07-16 ENCOUNTER — ANESTHESIA EVENT (OUTPATIENT)
Dept: PERIOP | Facility: HOSPITAL | Age: 40
End: 2020-07-16

## 2020-07-16 DIAGNOSIS — M86.171 ACUTE OSTEOMYELITIS OF RIGHT ANKLE OR FOOT (HCC): Primary | ICD-10-CM

## 2020-07-16 DIAGNOSIS — B99.9 INFECTION: ICD-10-CM

## 2020-07-16 LAB
BACTERIA SPEC AEROBE CULT: ABNORMAL
BACTERIA SPEC AEROBE CULT: ABNORMAL
GLUCOSE BLDC GLUCOMTR-MCNC: 183 MG/DL (ref 70–130)
GLUCOSE BLDC GLUCOMTR-MCNC: 209 MG/DL (ref 70–130)
GLUCOSE BLDC GLUCOMTR-MCNC: 226 MG/DL (ref 70–130)
GLUCOSE BLDC GLUCOMTR-MCNC: 233 MG/DL (ref 70–130)
GLUCOSE BLDC GLUCOMTR-MCNC: 234 MG/DL (ref 70–130)
GLUCOSE BLDC GLUCOMTR-MCNC: 245 MG/DL (ref 70–130)
GRAM STN SPEC: ABNORMAL
SARS-COV-2 N GENE RESP QL NAA+PROBE: NOT DETECTED

## 2020-07-16 PROCEDURE — 25010000002 PIPERACILLIN SOD-TAZOBACTAM PER 1 G: Performed by: PODIATRIST

## 2020-07-16 PROCEDURE — G0378 HOSPITAL OBSERVATION PER HR: HCPCS

## 2020-07-16 PROCEDURE — 87070 CULTURE OTHR SPECIMN AEROBIC: CPT | Performed by: PODIATRIST

## 2020-07-16 PROCEDURE — 25010000002 METOCLOPRAMIDE PER 10 MG: Performed by: ANESTHESIOLOGY

## 2020-07-16 PROCEDURE — 87186 SC STD MICRODIL/AGAR DIL: CPT | Performed by: PODIATRIST

## 2020-07-16 PROCEDURE — 25010000002 SUCCINYLCHOLINE PER 20 MG: Performed by: NURSE ANESTHETIST, CERTIFIED REGISTERED

## 2020-07-16 PROCEDURE — 82962 GLUCOSE BLOOD TEST: CPT

## 2020-07-16 PROCEDURE — 87015 SPECIMEN INFECT AGNT CONCNTJ: CPT | Performed by: PODIATRIST

## 2020-07-16 PROCEDURE — 87075 CULTR BACTERIA EXCEPT BLOOD: CPT | Performed by: PODIATRIST

## 2020-07-16 PROCEDURE — 94799 UNLISTED PULMONARY SVC/PX: CPT

## 2020-07-16 PROCEDURE — 25010000002 PROPOFOL 10 MG/ML EMULSION: Performed by: NURSE ANESTHETIST, CERTIFIED REGISTERED

## 2020-07-16 PROCEDURE — 25010000002 MIDAZOLAM PER 1 MG: Performed by: ANESTHESIOLOGY

## 2020-07-16 PROCEDURE — 63710000001 INSULIN DETEMIR PER 5 UNITS: Performed by: INTERNAL MEDICINE

## 2020-07-16 PROCEDURE — 25010000002 ONDANSETRON PER 1 MG: Performed by: NURSE ANESTHETIST, CERTIFIED REGISTERED

## 2020-07-16 PROCEDURE — 88305 TISSUE EXAM BY PATHOLOGIST: CPT | Performed by: PODIATRIST

## 2020-07-16 PROCEDURE — 63710000001 INSULIN LISPRO (HUMAN) PER 5 UNITS: Performed by: INTERNAL MEDICINE

## 2020-07-16 PROCEDURE — 87176 TISSUE HOMOGENIZATION CULTR: CPT | Performed by: PODIATRIST

## 2020-07-16 PROCEDURE — 25010000002 VANCOMYCIN 10 G RECONSTITUTED SOLUTION: Performed by: PODIATRIST

## 2020-07-16 PROCEDURE — 87205 SMEAR GRAM STAIN: CPT | Performed by: PODIATRIST

## 2020-07-16 PROCEDURE — 87147 CULTURE TYPE IMMUNOLOGIC: CPT | Performed by: PODIATRIST

## 2020-07-16 PROCEDURE — 25010000002 FENTANYL CITRATE (PF) 100 MCG/2ML SOLUTION: Performed by: NURSE ANESTHETIST, CERTIFIED REGISTERED

## 2020-07-16 PROCEDURE — 87077 CULTURE AEROBIC IDENTIFY: CPT | Performed by: PODIATRIST

## 2020-07-16 RX ORDER — OXYCODONE AND ACETAMINOPHEN 7.5; 325 MG/1; MG/1
1 TABLET ORAL EVERY 4 HOURS PRN
Status: DISCONTINUED | OUTPATIENT
Start: 2020-07-16 | End: 2020-07-17 | Stop reason: HOSPADM

## 2020-07-16 RX ORDER — SODIUM CHLORIDE 9 MG/ML
INJECTION, SOLUTION INTRAVENOUS AS NEEDED
Status: DISCONTINUED | OUTPATIENT
Start: 2020-07-16 | End: 2020-07-16 | Stop reason: HOSPADM

## 2020-07-16 RX ORDER — ZOLPIDEM TARTRATE 5 MG/1
5 TABLET ORAL NIGHTLY
Status: DISCONTINUED | OUTPATIENT
Start: 2020-07-16 | End: 2020-07-17 | Stop reason: HOSPADM

## 2020-07-16 RX ORDER — LABETALOL HYDROCHLORIDE 5 MG/ML
5 INJECTION, SOLUTION INTRAVENOUS
Status: DISCONTINUED | OUTPATIENT
Start: 2020-07-16 | End: 2020-07-16 | Stop reason: HOSPADM

## 2020-07-16 RX ORDER — IBUPROFEN 600 MG/1
600 TABLET ORAL ONCE AS NEEDED
Status: DISCONTINUED | OUTPATIENT
Start: 2020-07-16 | End: 2020-07-16 | Stop reason: HOSPADM

## 2020-07-16 RX ORDER — PROMETHAZINE HYDROCHLORIDE 25 MG/ML
12.5 INJECTION, SOLUTION INTRAMUSCULAR; INTRAVENOUS EVERY 4 HOURS PRN
Status: DISCONTINUED | OUTPATIENT
Start: 2020-07-16 | End: 2020-07-17 | Stop reason: HOSPADM

## 2020-07-16 RX ORDER — SODIUM CHLORIDE 0.9 % (FLUSH) 0.9 %
10 SYRINGE (ML) INJECTION AS NEEDED
Status: DISCONTINUED | OUTPATIENT
Start: 2020-07-16 | End: 2020-07-16 | Stop reason: HOSPADM

## 2020-07-16 RX ORDER — OXYCODONE AND ACETAMINOPHEN 7.5; 325 MG/1; MG/1
2 TABLET ORAL EVERY 4 HOURS PRN
Status: DISCONTINUED | OUTPATIENT
Start: 2020-07-16 | End: 2020-07-16 | Stop reason: HOSPADM

## 2020-07-16 RX ORDER — DEXTROSE MONOHYDRATE 25 G/50ML
25 INJECTION, SOLUTION INTRAVENOUS
Status: DISCONTINUED | OUTPATIENT
Start: 2020-07-16 | End: 2020-07-17 | Stop reason: HOSPADM

## 2020-07-16 RX ORDER — ONDANSETRON 2 MG/ML
4 INJECTION INTRAMUSCULAR; INTRAVENOUS EVERY 6 HOURS PRN
Status: DISCONTINUED | OUTPATIENT
Start: 2020-07-16 | End: 2020-07-17 | Stop reason: HOSPADM

## 2020-07-16 RX ORDER — OXYCODONE AND ACETAMINOPHEN 10; 325 MG/1; MG/1
1 TABLET ORAL EVERY 4 HOURS PRN
Status: DISCONTINUED | OUTPATIENT
Start: 2020-07-16 | End: 2020-07-17 | Stop reason: HOSPADM

## 2020-07-16 RX ORDER — FENTANYL CITRATE 50 UG/ML
INJECTION, SOLUTION INTRAMUSCULAR; INTRAVENOUS AS NEEDED
Status: DISCONTINUED | OUTPATIENT
Start: 2020-07-16 | End: 2020-07-16 | Stop reason: SURG

## 2020-07-16 RX ORDER — ACETAMINOPHEN 500 MG
1000 TABLET ORAL ONCE
Status: COMPLETED | OUTPATIENT
Start: 2020-07-16 | End: 2020-07-16

## 2020-07-16 RX ORDER — ONDANSETRON 2 MG/ML
4 INJECTION INTRAMUSCULAR; INTRAVENOUS ONCE AS NEEDED
Status: DISCONTINUED | OUTPATIENT
Start: 2020-07-16 | End: 2020-07-16 | Stop reason: HOSPADM

## 2020-07-16 RX ORDER — CEFAZOLIN SODIUM IN 0.9 % NACL 3 G/100 ML
3 INTRAVENOUS SOLUTION, PIGGYBACK (ML) INTRAVENOUS ONCE
Status: COMPLETED | OUTPATIENT
Start: 2020-07-16 | End: 2020-07-16

## 2020-07-16 RX ORDER — SODIUM CHLORIDE 0.9 % (FLUSH) 0.9 %
10 SYRINGE (ML) INJECTION EVERY 12 HOURS SCHEDULED
Status: DISCONTINUED | OUTPATIENT
Start: 2020-07-16 | End: 2020-07-16 | Stop reason: HOSPADM

## 2020-07-16 RX ORDER — FLUMAZENIL 0.1 MG/ML
0.2 INJECTION INTRAVENOUS AS NEEDED
Status: DISCONTINUED | OUTPATIENT
Start: 2020-07-16 | End: 2020-07-16 | Stop reason: HOSPADM

## 2020-07-16 RX ORDER — PROPOFOL 10 MG/ML
VIAL (ML) INTRAVENOUS AS NEEDED
Status: DISCONTINUED | OUTPATIENT
Start: 2020-07-16 | End: 2020-07-16 | Stop reason: SURG

## 2020-07-16 RX ORDER — OXYCODONE AND ACETAMINOPHEN 10; 325 MG/1; MG/1
1 TABLET ORAL ONCE AS NEEDED
Status: DISCONTINUED | OUTPATIENT
Start: 2020-07-16 | End: 2020-07-16 | Stop reason: HOSPADM

## 2020-07-16 RX ORDER — ACETAMINOPHEN 325 MG/1
650 TABLET ORAL EVERY 6 HOURS PRN
Status: DISCONTINUED | OUTPATIENT
Start: 2020-07-16 | End: 2020-07-17 | Stop reason: HOSPADM

## 2020-07-16 RX ORDER — MIDAZOLAM HYDROCHLORIDE 1 MG/ML
2 INJECTION INTRAMUSCULAR; INTRAVENOUS
Status: DISCONTINUED | OUTPATIENT
Start: 2020-07-16 | End: 2020-07-16 | Stop reason: HOSPADM

## 2020-07-16 RX ORDER — LIDOCAINE HYDROCHLORIDE 10 MG/ML
0.5 INJECTION, SOLUTION EPIDURAL; INFILTRATION; INTRACAUDAL; PERINEURAL ONCE AS NEEDED
Status: DISCONTINUED | OUTPATIENT
Start: 2020-07-16 | End: 2020-07-16 | Stop reason: HOSPADM

## 2020-07-16 RX ORDER — METOCLOPRAMIDE HYDROCHLORIDE 5 MG/ML
10 INJECTION INTRAMUSCULAR; INTRAVENOUS ONCE AS NEEDED
Status: COMPLETED | OUTPATIENT
Start: 2020-07-16 | End: 2020-07-16

## 2020-07-16 RX ORDER — SODIUM CHLORIDE 0.9 % (FLUSH) 0.9 %
3-10 SYRINGE (ML) INJECTION AS NEEDED
Status: DISCONTINUED | OUTPATIENT
Start: 2020-07-16 | End: 2020-07-16 | Stop reason: HOSPADM

## 2020-07-16 RX ORDER — AMLODIPINE BESYLATE 5 MG/1
5 TABLET ORAL DAILY
Status: DISCONTINUED | OUTPATIENT
Start: 2020-07-16 | End: 2020-07-17 | Stop reason: HOSPADM

## 2020-07-16 RX ORDER — NICOTINE POLACRILEX 4 MG
15 LOZENGE BUCCAL
Status: DISCONTINUED | OUTPATIENT
Start: 2020-07-16 | End: 2020-07-17 | Stop reason: HOSPADM

## 2020-07-16 RX ORDER — PRAVASTATIN SODIUM 20 MG
20 TABLET ORAL EVERY EVENING
Status: DISCONTINUED | OUTPATIENT
Start: 2020-07-16 | End: 2020-07-17 | Stop reason: HOSPADM

## 2020-07-16 RX ORDER — FENTANYL CITRATE 50 UG/ML
25 INJECTION, SOLUTION INTRAMUSCULAR; INTRAVENOUS
Status: DISCONTINUED | OUTPATIENT
Start: 2020-07-16 | End: 2020-07-16 | Stop reason: HOSPADM

## 2020-07-16 RX ORDER — SODIUM CHLORIDE, SODIUM LACTATE, POTASSIUM CHLORIDE, CALCIUM CHLORIDE 600; 310; 30; 20 MG/100ML; MG/100ML; MG/100ML; MG/100ML
100 INJECTION, SOLUTION INTRAVENOUS CONTINUOUS PRN
Status: DISCONTINUED | OUTPATIENT
Start: 2020-07-16 | End: 2020-07-16 | Stop reason: HOSPADM

## 2020-07-16 RX ORDER — ONDANSETRON 2 MG/ML
INJECTION INTRAMUSCULAR; INTRAVENOUS AS NEEDED
Status: DISCONTINUED | OUTPATIENT
Start: 2020-07-16 | End: 2020-07-16 | Stop reason: SURG

## 2020-07-16 RX ORDER — ROCURONIUM BROMIDE 10 MG/ML
INJECTION, SOLUTION INTRAVENOUS AS NEEDED
Status: DISCONTINUED | OUTPATIENT
Start: 2020-07-16 | End: 2020-07-16 | Stop reason: SURG

## 2020-07-16 RX ORDER — SODIUM CHLORIDE, SODIUM LACTATE, POTASSIUM CHLORIDE, CALCIUM CHLORIDE 600; 310; 30; 20 MG/100ML; MG/100ML; MG/100ML; MG/100ML
100 INJECTION, SOLUTION INTRAVENOUS CONTINUOUS
Status: DISCONTINUED | OUTPATIENT
Start: 2020-07-16 | End: 2020-07-17 | Stop reason: HOSPADM

## 2020-07-16 RX ORDER — SODIUM CHLORIDE 0.9 % (FLUSH) 0.9 %
3 SYRINGE (ML) INJECTION EVERY 12 HOURS SCHEDULED
Status: DISCONTINUED | OUTPATIENT
Start: 2020-07-16 | End: 2020-07-16 | Stop reason: HOSPADM

## 2020-07-16 RX ORDER — SUCCINYLCHOLINE CHLORIDE 20 MG/ML
INJECTION INTRAMUSCULAR; INTRAVENOUS AS NEEDED
Status: DISCONTINUED | OUTPATIENT
Start: 2020-07-16 | End: 2020-07-16 | Stop reason: SURG

## 2020-07-16 RX ORDER — NALOXONE HCL 0.4 MG/ML
0.4 VIAL (ML) INJECTION AS NEEDED
Status: DISCONTINUED | OUTPATIENT
Start: 2020-07-16 | End: 2020-07-16 | Stop reason: HOSPADM

## 2020-07-16 RX ADMIN — INSULIN LISPRO 5 UNITS: 100 INJECTION, SOLUTION INTRAVENOUS; SUBCUTANEOUS at 14:00

## 2020-07-16 RX ADMIN — INSULIN DETEMIR 20 UNITS: 100 INJECTION, SOLUTION SUBCUTANEOUS at 20:32

## 2020-07-16 RX ADMIN — METOCLOPRAMIDE 10 MG: 5 INJECTION, SOLUTION INTRAMUSCULAR; INTRAVENOUS at 07:06

## 2020-07-16 RX ADMIN — TAZOBACTAM SODIUM AND PIPERACILLIN SODIUM 4.5 G: 500; 4 INJECTION, SOLUTION INTRAVENOUS at 23:32

## 2020-07-16 RX ADMIN — INSULIN LISPRO 5 UNITS: 100 INJECTION, SOLUTION INTRAVENOUS; SUBCUTANEOUS at 17:03

## 2020-07-16 RX ADMIN — PROPOFOL 200 MG: 10 INJECTION, EMULSION INTRAVENOUS at 07:20

## 2020-07-16 RX ADMIN — INSULIN LISPRO 5 UNITS: 100 INJECTION, SOLUTION INTRAVENOUS; SUBCUTANEOUS at 17:01

## 2020-07-16 RX ADMIN — CEFAZOLIN 3 G: 1 INJECTION, POWDER, FOR SOLUTION INTRAMUSCULAR; INTRAVENOUS; PARENTERAL at 07:30

## 2020-07-16 RX ADMIN — VANCOMYCIN HYDROCHLORIDE 1250 MG: 10 INJECTION, POWDER, LYOPHILIZED, FOR SOLUTION INTRAVENOUS at 11:19

## 2020-07-16 RX ADMIN — ROCURONIUM BROMIDE 10 MG: 10 INJECTION INTRAVENOUS at 07:19

## 2020-07-16 RX ADMIN — FENTANYL CITRATE 100 MCG: 50 INJECTION, SOLUTION INTRAMUSCULAR; INTRAVENOUS at 07:19

## 2020-07-16 RX ADMIN — LIDOCAINE HYDROCHLORIDE 100 MG: 20 INJECTION, SOLUTION INTRAVENOUS at 07:20

## 2020-07-16 RX ADMIN — SUCCINYLCHOLINE CHLORIDE 160 MG: 20 INJECTION, SOLUTION INTRAMUSCULAR; INTRAVENOUS at 07:20

## 2020-07-16 RX ADMIN — ONDANSETRON HYDROCHLORIDE 4 MG: 2 SOLUTION INTRAMUSCULAR; INTRAVENOUS at 07:55

## 2020-07-16 RX ADMIN — TAZOBACTAM SODIUM AND PIPERACILLIN SODIUM 4.5 G: 500; 4 INJECTION, SOLUTION INTRAVENOUS at 16:40

## 2020-07-16 RX ADMIN — SODIUM CHLORIDE, POTASSIUM CHLORIDE, SODIUM LACTATE AND CALCIUM CHLORIDE 100 ML/HR: 600; 310; 30; 20 INJECTION, SOLUTION INTRAVENOUS at 09:48

## 2020-07-16 RX ADMIN — AMLODIPINE BESYLATE 5 MG: 5 TABLET ORAL at 11:19

## 2020-07-16 RX ADMIN — PRAVASTATIN SODIUM 20 MG: 20 TABLET ORAL at 16:40

## 2020-07-16 RX ADMIN — INSULIN LISPRO 5 UNITS: 100 INJECTION, SOLUTION INTRAVENOUS; SUBCUTANEOUS at 13:59

## 2020-07-16 RX ADMIN — MIDAZOLAM 2 MG: 1 INJECTION INTRAMUSCULAR; INTRAVENOUS at 07:05

## 2020-07-16 RX ADMIN — ACETAMINOPHEN 1000 MG: 500 TABLET, FILM COATED ORAL at 07:05

## 2020-07-16 RX ADMIN — SODIUM CHLORIDE, POTASSIUM CHLORIDE, SODIUM LACTATE AND CALCIUM CHLORIDE 100 ML/HR: 600; 310; 30; 20 INJECTION, SOLUTION INTRAVENOUS at 07:09

## 2020-07-16 RX ADMIN — ZOLPIDEM TARTRATE 5 MG: 5 TABLET ORAL at 21:33

## 2020-07-16 RX ADMIN — VANCOMYCIN HYDROCHLORIDE 1250 MG: 10 INJECTION, POWDER, LYOPHILIZED, FOR SOLUTION INTRAVENOUS at 22:57

## 2020-07-16 RX ADMIN — FENTANYL CITRATE 100 MCG: 50 INJECTION, SOLUTION INTRAMUSCULAR; INTRAVENOUS at 07:26

## 2020-07-16 RX ADMIN — TAZOBACTAM SODIUM AND PIPERACILLIN SODIUM 4.5 G: 500; 4 INJECTION, SOLUTION INTRAVENOUS at 11:19

## 2020-07-16 NOTE — CONSULTS
[facility] Medicine Consult  Consults    Date of Admission: 7/16/2020  Date of Consult: 07/16/20    Primary Care Physician: Kayla Chapman APRN  Referring Physician: Dr. Inman  Chief Complaint/Reason for Consultation: diabetes management     Subjective   History of Present Illness  Patient is a 40-year-old male with a history of prior left BKA, presented to the hospital for right foot osteomyelitis and partial amputation of right second toe by Dr. Inman earlier today.  He also is a history of hypertension, diabetes, hyperlipidemia.  Medicine team is been asked to help with diabetes management postop.  For this we have been consulted.  He otherwise this time is seen in room 364.  He feels well.  Denies any overt pain to the foot postoperatively.  Denies any recent fever chills.  No nausea or vomiting.  No chest pain or shortness of breath.  Tolerating p.o. fine.    Review of Systems   Otherwise complete ROS is negative except as mentioned above.    Past Medical History:   Past Medical History:   Diagnosis Date   • Anxiety    • Back pain    • Depression    • Diarrhea    • GERD (gastroesophageal reflux disease)    • Hypertension associated with diabetes (CMS/[facility]) 11/14/2019   • Mixed diabetic hyperlipidemia associated with type 2 diabetes mellitus (CMS/[facility]) 11/14/2019   • Sleep apnea     NO MACHINE   • Type 2 diabetes mellitus with hyperglycemia, with long-term current use of insulin (CMS/[facility]) 11/14/2019     Past Surgical History:  Past Surgical History:   Procedure Laterality Date   • BELOW KNEE AMPUTATION Left 08/2019   • ENDOSCOPY      stretched esophagus   • TONSILLECTOMY       Social History:  reports that he has never smoked. He has never used smokeless tobacco. He reports that he does not drink alcohol or use drugs.    Family History: family history includes Diabetes in his mother.     Allergies:   Allergies   Allergen Reactions   • Sitagliptin Anaphylaxis and  Swelling   • Codeine Palpitations   • Niacin And Related Nausea And Vomiting     Medications: Scheduled Meds:  amLODIPine 5 mg Oral Daily   [START ON 7/17/2020] enoxaparin 40 mg Subcutaneous Daily   insulin detemir 20 Units Subcutaneous Nightly   insulin lispro 3-14 Units Subcutaneous TID AC   insulin lispro 5 Units Subcutaneous TID With Meals   piperacillin-tazobactam 4.5 g Intravenous Q8H   pravastatin 20 mg Oral Q PM   vancomycin 1,250 mg Intravenous Q12H   zolpidem 5 mg Oral Nightly     Continuous Infusions:  lactated ringers 100 mL/hr Last Rate: 100 mL/hr (07/16/20 0948)     PRN Meds:.•  acetaminophen  •  dextrose  •  dextrose  •  glucagon (human recombinant)  •  ondansetron  •  oxyCODONE-acetaminophen  •  oxyCODONE-acetaminophen  •  promethazine    Objective   Objective    Physical Exam:   Temp:  [97.2 °F (36.2 °C)-99 °F (37.2 °C)] 97.3 °F (36.3 °C)  Heart Rate:  [] 77  Resp:  [11-20] 16  BP: ()/(29-97) 148/79  Physical Exam   GEN: Awake, alert, interactive, in NAD  HEENT: PERRLA, EOMI, Anicteric, Trachea midline  Lungs: CTAB, no wheezing/rales/rhonchi  Heart: RRR, +S1/s2, no rub  ABD: soft, nt/nd, +BS, no guarding/rebound  Extremities: L BKA, no stump wound. R foot wrapped, small cut on tip of R hallux, no drainage  Skin: R foot wrapped, no other obvious rashes or lesions  Neuro: AAOx3, no focal deficits  Psych: normal mood & affect      Results Reviewed:  I have personally reviewed current lab, radiology, and data and agree with results.  Lab Results (last 24 hours)     Procedure Component Value Units Date/Time    POC Glucose Once [[id number]]  (Abnormal) Collected:  07/16/20 1352    Specimen:  Blood Updated:  07/16/20 1406     Glucose 233 mg/dL      Comment: : [id number] Moose PamelaMeter ID: [id number]       POC Glucose Once [[id number]]  (Abnormal) Collected:  07/16/20 1123    Specimen:  Blood Updated:  07/16/20 1134     Glucose 245 mg/dL      Comment: : [id number] Moose PadillaelaMeter ID:  [id number]       Tissue / Bone Culture - Tissue, Toe, Right [[id number]] Collected:  07/16/20 0740    Specimen:  Tissue from Toe, Right Updated:  07/16/20 0943     Gram Stain Rare (1+) WBCs seen      No organisms seen    Tissue / Bone Culture - Tissue, Toe, Right [[id number]] Collected:  07/16/20 0734    Specimen:  Tissue from Toe, Right Updated:  07/16/20 0941     Gram Stain Many (4+) WBCs seen      Few (2+) Gram positive cocci, intracellular and extracellular      Rare (1+) Gram positive bacilli    Anaerobic Culture - Tissue, Toe, Right [[id number]] Collected:  07/16/20 0740    Specimen:  Tissue from Toe, Right Updated:  07/16/20 0840    Anaerobic Culture - Tissue, Toe, Right [[id number]] Collected:  07/16/20 0734    Specimen:  Tissue from Toe, Right Updated:  07/16/20 0838    Tissue Pathology Exam [[id number]] Collected:  07/16/20 0737    Specimen:  Tissue from Toe, Right Updated:  07/16/20 0819    POC Glucose Once [[id number]]  (Abnormal) Collected:  07/16/20 0802    Specimen:  Blood Updated:  07/16/20 0815     Glucose 209 mg/dL      Comment: : [id number] Benton KristalynnMeter ID: [id number]       POC Glucose Once [[id number]]  (Abnormal) Collected:  07/16/20 0616    Specimen:  Blood Updated:  07/16/20 0630     Glucose 234 mg/dL      Comment: : [id number] Ronnie JessicaMeter ID: [id number]           Imaging Results (Last 24 Hours)     ** No results found for the last 24 hours. **          Assessment / Plan   Assessment:     Active Hospital Problems    Diagnosis   • **Acute osteomyelitis of right ankle or foot (CMS/HCC)   • Type 2 diabetes mellitus with hyperglycemia, with long-term current use of insulin (CMS/[facility])   • Hypertension associated with diabetes (CMS/HCC)   • Mixed diabetic hyperlipidemia associated with type 2 diabetes mellitus (CMS/HCC)            Plan:   #1 acute osteomyelitis of right foot/second toe -has post OR today.  Currently on Zosyn.  I note infectious disease also been consulted for help  with management.  Will defer antibiotics to them.  This seems okay at this time.  Follow-up cultures.    #2 DM2 with hyperglycemia -term insulin use at home.  Do not have his insulins available here.  At this time we will start Levemir, prandial Humalog, sliding scale, hypoglycemia protocol.  We will follow-up and adjust as needed.    #3 essential hypertension -has been resumed on Norvasc.  We will follow-up monitor.    #4 hyperlipidemia -statin    I discussed the patient's findings and my recommendations with patient directly and family at bedside.    Patient seen and examined by me on 7/16/2020 at 12:35 PM.    Electronically signed by Gerson Moreno DO, 07/16/20, 14:48.

## 2020-07-16 NOTE — PLAN OF CARE
VSS, denies pain or nausea, tolerating regular diabetic diet, accuchecks and sliding scale insulin as ordered, ace dressing CDI to right foot, voiding, IVF and abx as ordered, safety maintained, will continue to monitor    Problem: Patient Care Overview  Goal: Plan of Care Review  Outcome: Ongoing (interventions implemented as appropriate)  Flowsheets  Taken 7/16/2020 1812  Progress: improving  Taken 7/16/2020 1000  Plan of Care Reviewed With: patient;family

## 2020-07-16 NOTE — OP NOTE
AMPUTATION DIGIT  Procedure Note    Andrzej Peters  7/16/2020    Pre-op Diagnosis:   OSTEOMYELITIS - RIGHT FOOT    Post-op Diagnosis:     Post-Op Diagnosis Codes:     * Osteomyelitis of ankle or foot, right, acute (CMS/[facility]) [M86.171]     * Diabetes mellitus (CMS/[facility]) [E11.9]     * Diabetic neuropathy (CMS/[facility]) [E11.40]    Procedure/CPT® Codes:      Procedure(s):  AMPUTATION PARTIAL SECOND DIGIT RIGHT FOOT    Surgeon(s):  Jean Marie Inman DPM    Anesthesia: General    Staff:   Circulator: Elvie Nunez RN  Scrub Person: Edilma Verma; Lisa Frey    Indications for procedure:  Osteomyelitis second with draining wound    Procedure details:  The patient brought the operating room placed under general anesthesia.  Right leg prepped and draped in usual sterile fashion.  Following procedures were performed.  Preprocedure 1 partial imitation second is right.  Attention was then directed dorsal aspect patient second is where a linear incision was created just distal to the interphalangeal joint.  A plantar flap was created.  Second digit at the proximal infallible joint was disarticulated and removed.  Bone was taken with a rondure from the distal portion of the distal phalanx for culture.  The wound was then pulse lavage.'s with 3000 cc lactated Ringer's.  No purulent drainage was was noted in the proximal portion of the second digit.  Sagittal saw was utilized to resect the a portion of the head of the proximal phalanx to allow for more appropriate closure.  This was sent to pathology for culture.  Wound was then irrigated.  The capsular tissues and tendon structures were reapproximated over the end of the bone utilizing 3-0 Vicryl.  Subcutaneous tissues reapproximated 3-0 Vicryl skin is reapproximated 3-0 nylon.  Sterile bandage applied.    Estimated Blood Loss: minimal    Specimens:                Specimens     ID Source Type Tests Collected By Collected At Frozen?      1 Toe, Right Tissue · ANAEROBIC  CULTURE  · TISSUE / BONE CULTURE   Jean Marie Inman DPM 7/16/20 0734      Description: right second toe tissue    2 Toe, Right Tissue · ANAEROBIC CULTURE  · TISSUE / BONE CULTURE   Jean Marie Inman DPM 7/16/20 0740      Description: proximal margin right second toe tissue    A Toe, Right Tissue · TISSUE PATHOLOGY EXAM   Jean Marie Inman DPM 7/16/20 0737      Description: right second toe            Drains: None    Implants: Nothing was implanted during the procedure     Complications: none    Follow up:   Will be admitted for IV antibiotics and follow closely while in hospital.    Jean Marie Inman DPM     Date: 7/16/2020  Time: 08:00

## 2020-07-16 NOTE — CONSULTS
INFECTIOUS DISEASES CONSULT NOTE    Patient:  Andrzej Peters 40 y.o. male  ROOM # 364/1  YOB: 1980  MRN: [id number]  [facility]:  [id number]  Admit date: 7/16/2020   Admitting Physician: Jean Marie Inman DPM  Primary Care Physician: Kayla Chapman APRN  REFERRING PROVIDER: Jean Marie Inman DPM      REASON FOR CONSULTATION :  IV antibiotics    HISTORY OF PRESENT ILLNESS:   Patient is a 40-year-old gentleman who was admitted with osteomyelitis second right hallux.  He was noted to have an open wound, exposed bone and purulent drainage could be expressed from the wound.    He underwent partial amputation of second digit right foot      He has a known history of diabetes, previous left BKA.    Past Medical History:   Diagnosis Date   • Anxiety    • Back pain    • Depression    • Diarrhea    • GERD (gastroesophageal reflux disease)    • Hypertension associated with diabetes (CMS/HCC) 11/14/2019   • Mixed diabetic hyperlipidemia associated with type 2 diabetes mellitus (CMS/[facility]) 11/14/2019   • Sleep apnea     NO MACHINE   • Type 2 diabetes mellitus with hyperglycemia, with long-term current use of insulin (CMS/[facility]) 11/14/2019     Past Surgical History:   Procedure Laterality Date   • AMPUTATION DIGIT Right 7/16/2020    Procedure: AMPUTATION PARTIAL VERSUS TOTAL SECOND DIGIT RIGHT FOOT;  Surgeon: Jean Marie Inman DPM;  Location: [facility];  Service: Podiatry;  Laterality: Right;   • BELOW KNEE AMPUTATION Left 08/2019   • ENDOSCOPY      stretched esophagus   • TONSILLECTOMY       Family History   Problem Relation Age of Onset   • Diabetes Mother      Social History     Socioeconomic History   • Marital status:      Spouse name: Not on file   • Number of children: Not on file   • Years of education: Not on file   • Highest education level: Not on file   Tobacco Use   • Smoking status: Never Smoker   • Smokeless tobacco: Never Used   Substance and Sexual Activity   • Alcohol use: No     Frequency:  Never   • Drug use: No   • Sexual activity: Defer           Current Meds:     Current Facility-Administered Medications   Medication Dose Route Frequency Provider Last Rate Last Dose   • acetaminophen (TYLENOL) tablet 650 mg  650 mg Oral Q6H PRN Florentin Peterson DO       • amLODIPine (NORVASC) tablet 5 mg  5 mg Oral Daily Jean Marie Inman DPM   5 mg at 07/16/20 1119   • dextrose (D50W) 25 g/ 50mL Intravenous Solution 25 g  25 g Intravenous Q15 Min PRN Gerson Moreno DO       • dextrose (GLUTOSE) oral gel 15 g  15 g Oral Q15 Min PRN Gerson Moreno DO       • [START ON 7/17/2020] enoxaparin (LOVENOX) syringe 40 mg  40 mg Subcutaneous Daily Jean Marie Inman DPM       • glucagon (human recombinant) (GLUCAGEN DIAGNOSTIC) injection 1 mg  1 mg Subcutaneous Q15 Min PRN Gerson Moreno DO       • insulin detemir (LEVEMIR) injection 20 Units  20 Units Subcutaneous Nightly Gerson Moreno DO       • insulin lispro (humaLOG) injection 3-14 Units  3-14 Units Subcutaneous TID AC Gerson Moreno DO   5 Units at 07/16/20 1703   • insulin lispro (humaLOG) injection 5 Units  5 Units Subcutaneous TID With Meals Gerson Moreno DO   5 Units at 07/16/20 1701   • lactated ringers infusion  100 mL/hr Intravenous Continuous Amari Chiu  mL/hr at 07/16/20 0948 100 mL/hr at 07/16/20 0948   • ondansetron (ZOFRAN) injection 4 mg  4 mg Intravenous Q6H PRN Jean Marie Inman DPM       • oxyCODONE-acetaminophen (PERCOCET)  MG per tablet 1 tablet  1 tablet Oral Q4H PRN Jean Marie Inman DPM       • oxyCODONE-acetaminophen (PERCOCET) 7.5-325 MG per tablet 1 tablet  1 tablet Oral Q4H PRN Jean Marie Inman DPM       • piperacillin-tazobactam (ZOSYN) 4.5 g in iso-osmotic dextrose 100 mL IVPB (premix)  4.5 g Intravenous Q8H Jean Marie Inman DPM   4.5 g at 07/16/20 1640   • pravastatin (PRAVACHOL) tablet 20 mg  20 mg Oral Q PM Jean Marie Inman DPM   20 mg at 07/16/20 1640   • promethazine (PHENERGAN)  injection 12.5 mg  12.5 mg Intravenous Q4H PRN Jean Marie Inman DPM       • vancomycin 1250 mg/250 mL 0.9% NS IVPB ([facility])  1,250 mg Intravenous Q12H Jean Marie Inman DPM   Stopped at 07/16/20 1310   • zolpidem (AMBIEN) tablet 5 mg  5 mg Oral Nightly Jean Marie Inman DPM           Home Meds:  Prior to Admission medications    Medication Sig Start Date End Date Taking? Authorizing Provider   doxycycline (VIBRAMYCIN) 100 MG capsule Take 100 mg by mouth 2 (Two) Times a Day. 7/13/20 7/27/20 Yes Oleg Knutson MD   Empagliflozin (JARDIANCE) 10 MG tablet Take 10 mg by mouth Daily. Before bkfast 11/14/19  Yes Vinicius Agustin MD   Insulin aspart (FIASP FLEXTOUCH) 100 UNIT/ML solution pen-injector injection pen UP TO 30 UNITS QAC TID  Patient taking differently: Inject 22 Units under the skin into the appropriate area as directed 3 (Three) Times a Day. Plus sliding scale insulin 3 times a day:  150-199= 4 units, 200-249 6 units= 250-299 8 units= 300-349 10 units= 350-399= 12 units 11/14/19  Yes Vinicius Agustin MD   Insulin Degludec (TRESIBA FLEXTOUCH) 200 UNIT/ML solution pen-injector pen injection Inject 100 Units under the skin into the appropriate area as directed Every Night.  Patient taking differently: Inject 85 Units under the skin into the appropriate area as directed Every Night. 11/14/19  Yes Vinicius Agustin MD   mupirocin (BACTROBAN) 2 % ointment Apply 1 application topically to the appropriate area as directed 2 (Two) Times a Day.   Yes Oleg Knutson MD   pravastatin (PRAVACHOL) 20 MG tablet Take 1 tablet by mouth Every Evening. 11/14/19 11/13/20 Yes Vinicius Agustin MD   ramelteon (ROZEREM) 8 MG tablet Take 2 mg by mouth At Night As Needed for Sleep.   Yes Oleg Knutson MD   amLODIPine (NORVASC) 5 MG tablet Take 5 mg by mouth Daily. 11/10/19   Provider, Historical, MD   TRULICITY 0.75 MG/0.5ML solution pen-injector Inject 0.75 mg under the skin  into the appropriate area as directed 1 (One) Time Per Week.  Patient taking differently: Inject 1.5 mg under the skin into the appropriate area as directed 1 (One) Time Per Week. 20   Vinicius Agustin MD            Allergies   Allergen Reactions   • Sitagliptin Anaphylaxis and Swelling   • Codeine Palpitations   • Niacin And Related Nausea And Vomiting       Review of Systems   Constitutional: Negative for fever.   Skin: Positive for wound.         Vital Signs:  BP (!) 162/103 (BP Location: Right arm, Patient Position: Lying)   Pulse 79   Temp 97.4 °F (36.3 °C) (Axillary)   Resp 16   SpO2 95%   Temp (24hrs), Av.8 °F (36.6 °C), Min:97.2 °F (36.2 °C), Max:99 °F (37.2 °C)      Physical Exam    General: The patient's nontoxic-appearing sitting up in bed with his wife at bedside.  HEENT: Sclera anicteric and noninjected  Respiratory: Effort even and unlabored  Amputation site surgical dressing is clean dry and intact.  Toes visible are pink with good capillary refill.  Psych: Pleasant cooperative  Neuro: Alert and oriented, speech is clear      Results Review:    I reviewed the patient's new clinical results.    Lab Results:  CBC:   Lab Results   Lab 20  1609   WBC 8.57   HEMOGLOBIN 12.6*   HEMATOCRIT 37.3*   PLATELETS 176       CMP:   Lab Results   Lab 20  1609   SODIUM 142   POTASSIUM 4.9   CHLORIDE 107   CO2 25.0   BUN 17   CREATININE 1.34*   CALCIUM 9.2   BILIRUBIN 0.3   ALK PHOS 90   ALT (SGPT) 9   AST (SGOT) 8   GLUCOSE 158*       Lab Results (last 72 hours)     Procedure Component Value Units Date/Time    POC Glucose Once [[id number]]  (Abnormal) Collected:  20 1638    Specimen:  Blood Updated:  20 1650     Glucose 226 mg/dL      Comment: : [id number] Moose PamelaMeter ID: [id number]       POC Glucose Once [[id number]]  (Abnormal) Collected:  20 1352    Specimen:  Blood Updated:  20 1406     Glucose 233 mg/dL      Comment: : [id number] Moose  PamelaMeter ID: [id number]       POC Glucose Once [[id number]]  (Abnormal) Collected:  07/16/20 1123    Specimen:  Blood Updated:  07/16/20 1134     Glucose 245 mg/dL      Comment: : [id number] Moose PamelaMeter ID: [id number]       Tissue / Bone Culture - Tissue, Toe, Right [[id number]] Collected:  07/16/20 0740    Specimen:  Tissue from Toe, Right Updated:  07/16/20 0943     Gram Stain Rare (1+) WBCs seen      No organisms seen    Tissue / Bone Culture - Tissue, Toe, Right [[id number]] Collected:  07/16/20 0734    Specimen:  Tissue from Toe, Right Updated:  07/16/20 0941     Gram Stain Many (4+) WBCs seen      Few (2+) Gram positive cocci, intracellular and extracellular      Rare (1+) Gram positive bacilli    Anaerobic Culture - Tissue, Toe, Right [[id number]] Collected:  07/16/20 0740    Specimen:  Tissue from Toe, Right Updated:  07/16/20 0840    Anaerobic Culture - Tissue, Toe, Right [[id number]] Collected:  07/16/20 0734    Specimen:  Tissue from Toe, Right Updated:  07/16/20 0838    Tissue Pathology Exam [[id number]] Collected:  07/16/20 0737    Specimen:  Tissue from Toe, Right Updated:  07/16/20 0819    POC Glucose Once [[id number]]  (Abnormal) Collected:  07/16/20 0802    Specimen:  Blood Updated:  07/16/20 0815     Glucose 209 mg/dL      Comment: : [id number] Benton CodylynnMeter ID: [id number]       POC Glucose Once [[id number]]  (Abnormal) Collected:  07/16/20 0616    Specimen:  Blood Updated:  07/16/20 0630     Glucose 234 mg/dL      Comment: : [id number] Ronnie GalvanicaMeter ID: [id number]             Surgical cultures from this morning are pending.    Hemoglobin A1c from July 13 7.0    CRP 1.15    Sed rate 66      Culture Results:  Tissue Culture   Lab   Moderate growth (3+) Staphylococcus aureusAbnormal    [facility]      Light growth (2+) Normal Skin Chuyita   [facility]             Gram Stain   Lab   Few (2+) WBCs seen [facility]      Few (2+) Gram positive cocci [facility]      Rare (1+) Gram  positive bacilli  [facility]            Susceptibility      Staphylococcus aureus     AUGIE     Clindamycin 0.25 ug/ml Susceptible     Erythromycin <=0.25 ug/ml Susceptible     Inducible Clindamycin Resistance NEG ug/ml Negative     Oxacillin 0.5 ug/ml Susceptible     Penicillin G >=0.5 ug/ml Resistant     Rifampin <=0.5 ug/ml Susceptible     Tetracycline <=1 ug/ml Susceptible     Trimethoprim + Sulfamethoxazole <=10 ug/ml Susceptible     Vancomycin <=0.5 ug/ml Susceptible            Linear View   Susceptibility Comments     Staphylococcus aureus   This isolate does not demonstrate inducible clindamycin resistance in vitro.          Specimen Collected: 07/13/20 14:47 Last Resulted: 07/16/20 07:19 Order Details View Encounter Lab and Collection Details Routing Result H            Radiology:   Imaging Results (Last 72 Hours)     ** No results found for the last 72 hours. **            Assessment/Plan       Acute osteomyelitis of right ankle or foot (CMS/HCC)    Type 2 diabetes mellitus with hyperglycemia, with long-term current use of insulin (CMS/HCC)    Hypertension associated with diabetes (CMS/HCC)    Mixed diabetic hyperlipidemia associated with type 2 diabetes mellitus (CMS/HCC)  Chronic kidney disease    RECOMMENDATION:   · Continue current antibiotics  · Follow-up surgical cultures-previous cultures positive for methicillin susceptible Staphylococcus aureus the patient has no antibiotic allergies which allows for several options of same organism grows.  · Discussed with Dr. Inman.  Plan is for possible discharge on oral antibiotics pending on appearance of wound postoperatively        Rakel Grullon MD  07/16/20  18:09

## 2020-07-16 NOTE — ANESTHESIA PREPROCEDURE EVALUATION
Anesthesia Evaluation     Patient summary reviewed   no history of anesthetic complications:  NPO Solid Status: > 8 hours  NPO Liquid Status: > 8 hours           Airway   Mallampati: III  TM distance: >3 FB  Neck ROM: full  Possible difficult intubation and Large neck circumference  Dental          Pulmonary - normal exam    breath sounds clear to auscultation  (+) sleep apnea (not compliant with CPAP),   (-) asthma, recent URI, not a smoker  Cardiovascular - normal exam  Exercise tolerance: good (4-7 METS)    ECG reviewed  Rhythm: regular  Rate: normal    (+) hypertension, hyperlipidemia,   (-) pacemaker, past MI, angina, cardiac stents, CABG      Neuro/Psych  (+) psychiatric history Anxiety and Depression,     (-) seizures, TIA, CVA  GI/Hepatic/Renal/Endo    (+) morbid obesity,  diabetes mellitus using insulin,   (-) GERD, liver disease, no renal disease, no thyroid disorder    ROS Comment: Esophageal dilation due to stricture 2018    Musculoskeletal     Abdominal   (+) obese,    Substance History      OB/GYN          Other                        Anesthesia Plan    ASA 3     general     intravenous induction     Anesthetic plan, all risks, benefits, and alternatives have been provided, discussed and informed consent has been obtained with: patient.

## 2020-07-16 NOTE — ANESTHESIA PROCEDURE NOTES
Airway  Urgency: elective    Date/Time: 7/16/2020 7:21 AM  Airway not difficult    General Information and Staff    Patient location during procedure: OR  CRNA: Gilberto Gutiérrez CRNA    Indications and Patient Condition  Indications for airway management: airway protection    Preoxygenated: yes  Mask difficulty assessment: 0 - not attempted    Final Airway Details  Final airway type: endotracheal airway      Successful airway: ETT  Cuffed: yes   Successful intubation technique: direct laryngoscopy  Facilitating devices/methods: intubating stylet  Endotracheal tube insertion site: oral  Blade: Clement  Blade size: 2  ETT size (mm): 7.5  Cormack-Lehane Classification: grade IIb - view of arytenoids or posterior of glottis only  Placement verified by: capnometry   Cuff volume (mL): 8  Measured from: lips  ETT/EBT  to lips (cm): 23  Number of attempts at approach: 1  Assessment: lips, teeth, and gum same as pre-op and atraumatic intubation

## 2020-07-16 NOTE — ANESTHESIA POSTPROCEDURE EVALUATION
Patient: Andrzej Peters    Procedure Summary     Date:  07/16/20 Room / Location:   [facility] OR  /  [facility]    Anesthesia Start:  0716 Anesthesia Stop:  0801    Procedure:  AMPUTATION PARTIAL VERSUS TOTAL SECOND DIGIT RIGHT FOOT (Right Toe Second) Diagnosis:       Osteomyelitis of ankle or foot, right, acute (CMS/HCC)      Diabetes mellitus (CMS/HCC)      Diabetic neuropathy (CMS/HCC)      (OSTEOMYELITIS - RIGHT FOOT)    Surgeon:  Jean Marie Inman DPM Provider:  Gilberto Gutiérrez CRNA    Anesthesia Type:  general ASA Status:  3          Anesthesia Type: general    Vitals  Vitals Value Taken Time   /77 7/16/2020  9:03 AM   Temp 97.7 °F (36.5 °C) 7/16/2020  8:58 AM   Pulse 88 7/16/2020  9:05 AM   Resp 12 7/16/2020  8:58 AM   SpO2 96 % 7/16/2020  9:05 AM   Vitals shown include unvalidated device data.        Post Anesthesia Care and Evaluation    Patient location during evaluation: PACU  Patient participation: complete - patient participated  Level of consciousness: awake and alert  Pain management: adequate  Airway patency: patent  Anesthetic complications: No anesthetic complications  PONV Status: none  Cardiovascular status: acceptable and hemodynamically stable  Respiratory status: acceptable  Hydration status: acceptable    Comments: Blood pressure 148/79, pulse 77, temperature 97.3 °F (36.3 °C), temperature source Oral, resp. rate 16, SpO2 95 %.    Patient discharged from PACU based upon Jose G score. Please see RN notes for further details

## 2020-07-16 NOTE — PROGRESS NOTES
"Pharmacy Dosing Service  Pharmacokinetics  Vancomycin Initial Evaluation    Assessment/Action/Plan:  Initiated Vancomycin 1250 mg IVPB every 12 hours. Vancomycin levels not ordered at this time. Patient is also receiving Zosyn. SCr = 1.34 (appears to be baseline).  History of below-knee amputation left    Per [employer] calculator:  Regimen: 1250 mg every 12 hours  Start time: 11:03 on 07/16/2020  Exposure target: AUC24 (range)400-600 mg/L.hr  AUC24,ss: 521 mg/L.hr  PAUC*: 69 %  Ctrough,ss: 14.2 mg/L  Pconc*: 33 %  Tox.: 9 %    Current vancomycin end date: 7/18/2020. Pharmacy will monitor renal function and adjust dose accordingly.     Subjective:  Andrzej Peters is a 40 y.o. male with a Vancomycin \"Pharmacy to Dose\" consult for the treatment of Bone and/or Joint Infection .    Objective:  Ht:  ; Wt:    Estimated Creatinine Clearance: 128.8 mL/min (A) (by C-G formula based on SCr of 1.34 mg/dL (H)).   Lab Results   Component Value Date    CREATININE 1.34 (H) 07/13/2020    CREATININE 1.3 (H) 10/28/2019    CREATININE 1.4 (H) 10/24/2019    CREATININE 1.3 (H) 10/21/2019      Lab Results   Component Value Date    WBC 8.57 07/13/2020    WBC 7.0 10/28/2019    WBC 5.8 10/24/2019      Baseline culture results:  Microbiology Results (last 10 days)       Procedure Component Value - Date/Time    Tissue / Bone Culture - Tissue, Toe, Right [[id number]] Collected:  07/16/20 0740    Lab Status:  Preliminary result Specimen:  Tissue from Toe, Right Updated:  07/16/20 0943     Gram Stain Rare (1+) WBCs seen      No organisms seen    Tissue / Bone Culture - Tissue, Toe, Right [[id number]] Collected:  07/16/20 0743    Lab Status:  Preliminary result Specimen:  Tissue from Toe, Right Updated:  07/16/20 0941     Gram Stain Many (4+) WBCs seen      Few (2+) Gram positive cocci, intracellular and extracellular      Rare (1+) Gram positive bacilli    COVID PRE-OP / PRE-PROCEDURE SCREENING ORDER (NO ISOLATION) - Swab, Nasopharynx [[id number]] " Collected:  07/15/20 1717    Lab Status:  Final result Specimen:  Swab from Nasopharynx Updated:  07/16/20 0134    Narrative:       The following orders were created for panel order COVID PRE-OP / PRE-PROCEDURE SCREENING ORDER (NO ISOLATION) - Swab, Nasopharynx.  Procedure                               Abnormality         Status                     ---------                               -----------         ------                     COVID-19, [facility] IN-HOUS...[[id number]]  Normal              Final result                 Please view results for these tests on the individual orders.    COVID-19, [facility] IN-HOUSE, NP SWAB IN TRANSPORT MEDIA 8-10 HR TAT - Swab, Nasopharynx [[id number]]  (Normal) Collected:  07/15/20 1717    Lab Status:  Final result Specimen:  Swab from Nasopharynx Updated:  07/16/20 0134     COVID19 Not Detected    Narrative:       Testing performed by Real Time RT-PCR  This test has not been approved by the [facility] but is authorized under the Emergency Use Act (EUA)    Fact sheet for providers: https://www.fda.gov/media/928278/download    Fact sheet for patients: https://www.fda.gov/media/288758/download        Tissue / Bone Culture - Tissue, Toe, Right [[id number]]  (Abnormal)  (Susceptibility) Collected:  07/13/20 1447    Lab Status:  Final result Specimen:  Tissue from Toe, Right Updated:  07/16/20 0719     Tissue Culture Moderate growth (3+) Staphylococcus aureus      Light growth (2+) Normal Skin Chuyita     Gram Stain Few (2+) WBCs seen      Few (2+) Gram positive cocci      Rare (1+) Gram positive bacilli    Susceptibility        Staphylococcus aureus     AUGIE     Clindamycin Susceptible     Erythromycin Susceptible     Inducible Clindamycin Resistance Negative     Oxacillin Susceptible     Penicillin G Resistant     Rifampin Susceptible     Tetracycline Susceptible     Trimethoprim + Sulfamethoxazole Susceptible     Vancomycin Susceptible                  Susceptibility Comments       Staphylococcus  aureus    This isolate does not demonstrate inducible clindamycin resistance in vitro.                         Joao Noriega, PharmD  07/16/20 10:06

## 2020-07-16 NOTE — H&P
Patient Care Team:  Kayla Chapman APRN as PCP - General (Nurse Practitioner)    Chief complaint osteomyelitis second issue right foot.    Subjective     History of Present Illness     History of an open wound to the right hallux several weeks duration.  Bone is exposed as a purulent drainage expressed.    Review of Systems   Constitutional: Negative.    HENT: Negative.    All other systems reviewed and are negative.           Objective      Vital Signs  Temp:  [97.2 °F (36.2 °C)] 97.2 °F (36.2 °C)  Heart Rate:  [] 95  Resp:  [18-20] 18  BP: (154-168)/(89-97) 168/97    Physical Exam   Constitutional: He is oriented to person, place, and time. He appears well-developed and well-nourished.   Eyes: Pupils are equal, round, and reactive to light. EOM are normal.   Musculoskeletal: Normal range of motion. He exhibits edema, tenderness and deformity.   Neurological: He is alert and oriented to person, place, and time.   Skin: Capillary refill takes less than 2 seconds. There is erythema.   Psychiatric: He has a normal mood and affect. His behavior is normal. Judgment and thought content normal.   Nursing note and vitals reviewed.      Results Review:  X-ray show erosion of the distal phalanx right second digit with soft tissue edema      Assessment/Plan       * No active hospital problems. *  Osteomyelitis second right    Diabetes mellitus    Diabetic neuropathy    History of below-knee amputation left    Assessment & Plan    I discussed the patients findings and my recommendations with the patient today.  We discussed partial versus second digit amputation.  Discussed follow-up antibiotics and hospital admission postoperatively.  Risk benefits were discussed and reviewed.    Jean Marie Inman DPM  07/16/20  07:01

## 2020-07-17 VITALS
WEIGHT: 315 LBS | HEIGHT: 76 IN | OXYGEN SATURATION: 94 % | DIASTOLIC BLOOD PRESSURE: 70 MMHG | TEMPERATURE: 97.6 F | HEART RATE: 92 BPM | SYSTOLIC BLOOD PRESSURE: 150 MMHG | RESPIRATION RATE: 16 BRPM | BODY MASS INDEX: 38.36 KG/M2

## 2020-07-17 LAB
ANION GAP SERPL CALCULATED.3IONS-SCNC: 10 MMOL/L (ref 5–15)
BUN SERPL-MCNC: 16 MG/DL (ref 6–20)
BUN/CREAT SERPL: 11.7 (ref 7–25)
CALCIUM SPEC-SCNC: 8.7 MG/DL (ref 8.6–10.5)
CHLORIDE SERPL-SCNC: 104 MMOL/L (ref 98–107)
CO2 SERPL-SCNC: 26 MMOL/L (ref 22–29)
CREAT SERPL-MCNC: 1.37 MG/DL (ref 0.76–1.27)
GFR SERPL CREATININE-BSD FRML MDRD: 58 ML/MIN/1.73
GLUCOSE BLDC GLUCOMTR-MCNC: 190 MG/DL (ref 70–130)
GLUCOSE SERPL-MCNC: 187 MG/DL (ref 65–99)
POTASSIUM SERPL-SCNC: 4.2 MMOL/L (ref 3.5–5.2)
SODIUM SERPL-SCNC: 140 MMOL/L (ref 136–145)

## 2020-07-17 PROCEDURE — 25010000002 PIPERACILLIN SOD-TAZOBACTAM PER 1 G: Performed by: PODIATRIST

## 2020-07-17 PROCEDURE — 82962 GLUCOSE BLOOD TEST: CPT

## 2020-07-17 PROCEDURE — 25010000002 ENOXAPARIN PER 10 MG: Performed by: PODIATRIST

## 2020-07-17 PROCEDURE — 63710000001 INSULIN LISPRO (HUMAN) PER 5 UNITS: Performed by: INTERNAL MEDICINE

## 2020-07-17 PROCEDURE — 80048 BASIC METABOLIC PNL TOTAL CA: CPT | Performed by: INTERNAL MEDICINE

## 2020-07-17 PROCEDURE — G0378 HOSPITAL OBSERVATION PER HR: HCPCS

## 2020-07-17 PROCEDURE — 25010000002 VANCOMYCIN 10 G RECONSTITUTED SOLUTION: Performed by: PODIATRIST

## 2020-07-17 RX ORDER — CLINDAMYCIN HYDROCHLORIDE 300 MG/1
300 CAPSULE ORAL 3 TIMES DAILY
Qty: 30 CAPSULE | Refills: 0 | Status: SHIPPED | OUTPATIENT
Start: 2020-07-17

## 2020-07-17 RX ORDER — HYDROCODONE BITARTRATE AND ACETAMINOPHEN 5; 325 MG/1; MG/1
1 TABLET ORAL EVERY 4 HOURS PRN
Qty: 20 TABLET | Refills: 0 | Status: SHIPPED | OUTPATIENT
Start: 2020-07-17 | End: 2020-07-20

## 2020-07-17 RX ADMIN — INSULIN LISPRO 5 UNITS: 100 INJECTION, SOLUTION INTRAVENOUS; SUBCUTANEOUS at 07:49

## 2020-07-17 RX ADMIN — ENOXAPARIN SODIUM 40 MG: 40 INJECTION SUBCUTANEOUS at 08:47

## 2020-07-17 RX ADMIN — INSULIN LISPRO 5 UNITS: 100 INJECTION, SOLUTION INTRAVENOUS; SUBCUTANEOUS at 11:13

## 2020-07-17 RX ADMIN — VANCOMYCIN HYDROCHLORIDE 1250 MG: 10 INJECTION, POWDER, LYOPHILIZED, FOR SOLUTION INTRAVENOUS at 12:18

## 2020-07-17 RX ADMIN — AMLODIPINE BESYLATE 5 MG: 5 TABLET ORAL at 08:47

## 2020-07-17 RX ADMIN — INSULIN LISPRO 3 UNITS: 100 INJECTION, SOLUTION INTRAVENOUS; SUBCUTANEOUS at 11:13

## 2020-07-17 RX ADMIN — INSULIN LISPRO 3 UNITS: 100 INJECTION, SOLUTION INTRAVENOUS; SUBCUTANEOUS at 07:48

## 2020-07-17 RX ADMIN — TAZOBACTAM SODIUM AND PIPERACILLIN SODIUM 4.5 G: 500; 4 INJECTION, SOLUTION INTRAVENOUS at 08:45

## 2020-07-17 NOTE — PROGRESS NOTES
Orthopedic Foot/Ankle Progress Note    Subjective     Hospital Course: improved        Objective     Vital signs in last 24 hours:  Temp:  [97.3 °F (36.3 °C)-98 °F (36.7 °C)] 97.8 °F (36.6 °C)  Heart Rate:  [] 81  Resp:  [11-16] 16  BP: ()/() 150/85  Much less edema and erythema to the right foot.  Wound edges well approximated sutures are intact.            Data Review  CBC:  Results from last 7 days   Lab Units 07/13/20  1609   WBC 10*3/mm3 8.57   RBC 10*6/mm3 4.19   HEMOGLOBIN g/dL 12.6*   HEMATOCRIT % 37.3*   PLATELETS 10*3/mm3 176     Lab Results (last 24 hours)     Procedure Component Value Units Date/Time    Tissue / Bone Culture - Tissue, Toe, Right [[id number]] Collected:  07/16/20 0740    Specimen:  Tissue from Toe, Right Updated:  07/17/20 0735     Tissue Culture No growth     Gram Stain Rare (1+) WBCs seen      No organisms seen    Basic Metabolic Panel [[id number]]  (Abnormal) Collected:  07/17/20 0354    Specimen:  Blood Updated:  07/17/20 0528     Glucose 187 mg/dL      BUN 16 mg/dL      Creatinine 1.37 mg/dL      Sodium 140 mmol/L      Potassium 4.2 mmol/L      Chloride 104 mmol/L      CO2 26.0 mmol/L      Calcium 8.7 mg/dL      eGFR Non African Amer 58 mL/min/1.73      BUN/Creatinine Ratio 11.7     Anion Gap 10.0 mmol/L     Narrative:       GFR Normal >60  Chronic Kidney Disease <60  Kidney Failure <15      POC Glucose Once [[id number]]  (Abnormal) Collected:  07/16/20 2030    Specimen:  Blood Updated:  07/16/20 2041     Glucose 183 mg/dL      Comment: : [id number] Sumanth Tonie  LMeter ID: [id number]       POC Glucose Once [[id number]]  (Abnormal) Collected:  07/16/20 1638    Specimen:  Blood Updated:  07/16/20 1650     Glucose 226 mg/dL      Comment: : [id number] Moose PamelaMeter ID: [id number]       POC Glucose Once [[id number]]  (Abnormal) Collected:  07/16/20 1352    Specimen:  Blood Updated:  07/16/20 1406     Glucose 233 mg/dL      Comment: : [id number] Virk  PamelaMeter ID: [id number]       POC Glucose Once [[id number]]  (Abnormal) Collected:  07/16/20 1123    Specimen:  Blood Updated:  07/16/20 1134     Glucose 245 mg/dL      Comment: : [id number] Moose PamelaMeter ID: [id number]       Tissue / Bone Culture - Tissue, Toe, Right [[id number]] Collected:  07/16/20 0734    Specimen:  Tissue from Toe, Right Updated:  07/16/20 0941     Gram Stain Many (4+) WBCs seen      Few (2+) Gram positive cocci, intracellular and extracellular      Rare (1+) Gram positive bacilli    Anaerobic Culture - Tissue, Toe, Right [[id number]] Collected:  07/16/20 0740    Specimen:  Tissue from Toe, Right Updated:  07/16/20 0840    Anaerobic Culture - Tissue, Toe, Right [[id number]] Collected:  07/16/20 0734    Specimen:  Tissue from Toe, Right Updated:  07/16/20 0838    Tissue Pathology Exam [[id number]] Collected:  07/16/20 0737    Specimen:  Tissue from Toe, Right Updated:  07/16/20 0819    POC Glucose Once [[id number]]  (Abnormal) Collected:  07/16/20 0802    Specimen:  Blood Updated:  07/16/20 0815     Glucose 209 mg/dL      Comment: : [id number] Benton CaglesharalynnMeter ID: [id number]             Assessment/Plan     1 day status post partial irritation second digit with cellulitis    Plan  Limited weightbearing use of surgical shoe.  Patient instructed to keep this bandage clean and dry.  Please reinforce that prior to discharge.  Plan on discharge this afternoon so he can get additional IV antibiotic.  Will place on on oral clindamycin.     LOS: 0 days     Jean Marie Inman DPM    Date: 7/17/2020  Time: 08:12

## 2020-07-17 NOTE — PLAN OF CARE
Problem: Patient Care Overview  Goal: Plan of Care Review  Outcome: Ongoing (interventions implemented as appropriate)  Flowsheets (Taken 7/17/2020 0254)  Progress: no change  Plan of Care Reviewed With: patient  Outcome Summary: Pt denies any pain so far this shift.  Ace wrap drsg to right foot.  Elevated on pillows.  IV abx.  Voiding.  Had large BM on 07/16.  Cont to monitor.

## 2020-07-18 LAB
BACTERIA SPEC ANAEROBE CULT: ABNORMAL
BACTERIA SPEC ANAEROBE CULT: ABNORMAL

## 2020-07-19 LAB
BACTERIA SPEC AEROBE CULT: NORMAL
GRAM STN SPEC: NORMAL
GRAM STN SPEC: NORMAL

## 2020-07-20 ENCOUNTER — TELEPHONE (OUTPATIENT)
Dept: INTERNAL MEDICINE | Age: 40
End: 2020-07-20

## 2020-07-20 ENCOUNTER — OFFICE VISIT (OUTPATIENT)
Dept: ENDOCRINOLOGY | Facility: CLINIC | Age: 40
End: 2020-07-20

## 2020-07-20 VITALS
OXYGEN SATURATION: 99 % | SYSTOLIC BLOOD PRESSURE: 160 MMHG | BODY MASS INDEX: 38.36 KG/M2 | HEIGHT: 76 IN | HEART RATE: 90 BPM | DIASTOLIC BLOOD PRESSURE: 120 MMHG | WEIGHT: 315 LBS

## 2020-07-20 DIAGNOSIS — E11.69 MIXED DIABETIC HYPERLIPIDEMIA ASSOCIATED WITH TYPE 2 DIABETES MELLITUS (HCC): ICD-10-CM

## 2020-07-20 DIAGNOSIS — E11.59 HYPERTENSION ASSOCIATED WITH DIABETES (HCC): ICD-10-CM

## 2020-07-20 DIAGNOSIS — G47.33 OBSTRUCTIVE SLEEP APNEA SYNDROME: ICD-10-CM

## 2020-07-20 DIAGNOSIS — E11.42 TYPE 2 DIABETES MELLITUS WITH POLYNEUROPATHY (HCC): Primary | ICD-10-CM

## 2020-07-20 DIAGNOSIS — E78.2 MIXED DIABETIC HYPERLIPIDEMIA ASSOCIATED WITH TYPE 2 DIABETES MELLITUS (HCC): ICD-10-CM

## 2020-07-20 DIAGNOSIS — I15.2 HYPERTENSION ASSOCIATED WITH DIABETES (HCC): ICD-10-CM

## 2020-07-20 LAB
BACTERIA SPEC AEROBE CULT: ABNORMAL
BACTERIA SPEC AEROBE CULT: ABNORMAL
GRAM STN SPEC: ABNORMAL

## 2020-07-20 PROCEDURE — 99214 OFFICE O/P EST MOD 30 MIN: CPT | Performed by: INTERNAL MEDICINE

## 2020-07-20 RX ORDER — INSULIN ASPART INJECTION 100 [IU]/ML
INJECTION, SOLUTION SUBCUTANEOUS
Qty: 27 PEN | Refills: 11 | Status: SHIPPED | OUTPATIENT
Start: 2020-07-20

## 2020-07-20 NOTE — PROGRESS NOTES
" Andrzej Peters is a 40 y.o. male who presents for  evaluation of   Chief Complaint   Patient presents with   • Follow-up     4 mo mellissa        Referring provider     Primary Care Provider    Kayla Chapman APRN    Duration since age 23 years old     Timing - Diabetes is Constant    Quality -  improved    Severity -  severe    Complications - had Left BKA due to diabetic foot and right second toe amputation , neuropathy     Current symptoms/problems  none     Alleviating Factors: Compliance       Side Effects  metformin    Current diet  in general, a \"healthy\" diet      Current exercise none    Current monitoring regimen: home blood tests - checking 4 x daily   Home blood sugar records: 100    Hypoglycemia none    Past Medical History:   Diagnosis Date   • Anxiety    • Back pain    • Depression    • Diarrhea    • GERD (gastroesophageal reflux disease)    • Hypertension associated with diabetes (CMS/HCC) 11/14/2019   • Mixed diabetic hyperlipidemia associated with type 2 diabetes mellitus (CMS/[facility]) 11/14/2019   • Sleep apnea     NO MACHINE   • Type 2 diabetes mellitus with hyperglycemia, with long-term current use of insulin (CMS/HCC) 11/14/2019     Family History   Problem Relation Age of Onset   • Diabetes Mother      Social History     Tobacco Use   • Smoking status: Never Smoker   • Smokeless tobacco: Never Used   Substance Use Topics   • Alcohol use: No     Frequency: Never   • Drug use: No         Current Outpatient Medications:   •  amLODIPine (NORVASC) 5 MG tablet, Take 5 mg by mouth Daily., Disp: , Rfl:   •  clindamycin (Cleocin) 300 MG capsule, Take 1 capsule by mouth 3 (Three) Times a Day., Disp: 30 capsule, Rfl: 0  •  Empagliflozin (JARDIANCE) 10 MG tablet, Take 10 mg by mouth Daily. Before bkfast, Disp: 30 tablet, Rfl: 11  •  Insulin aspart (FIASP FLEXTOUCH) 100 UNIT/ML solution pen-injector injection pen, UP TO 30 UNITS QAC TID (Patient taking differently: Inject 22 Units under the skin into the " appropriate area as directed 3 (Three) Times a Day. Plus sliding scale insulin 3 times a day:  150-199= 4 units, 200-249 6 units= 250-299 8 units= 300-349 10 units= 350-399= 12 units), Disp: 9 pen, Rfl: 11  •  Insulin Degludec (TRESIBA FLEXTOUCH) 200 UNIT/ML solution pen-injector pen injection, Inject 100 Units under the skin into the appropriate area as directed Every Night. (Patient taking differently: Inject 85 Units under the skin into the appropriate area as directed Every Night.), Disp: 5 pen, Rfl: 11  •  pravastatin (PRAVACHOL) 20 MG tablet, Take 1 tablet by mouth Every Evening., Disp: 30 tablet, Rfl: 11  •  ramelteon (ROZEREM) 8 MG tablet, Take 2 mg by mouth At Night As Needed for Sleep., Disp: , Rfl:   •  TRULICITY 0.75 MG/0.5ML solution pen-injector, Inject 0.75 mg under the skin into the appropriate area as directed 1 (One) Time Per Week. (Patient taking differently: Inject 1.5 mg under the skin into the appropriate area as directed 1 (One) Time Per Week. mondays), Disp: 2 mL, Rfl: 3    Review of Systems    Review of Systems   Constitutional: Positive for fatigue. Negative for activity change, appetite change, chills, diaphoresis, fever and unexpected weight change.   HENT: Negative for congestion, dental problem, drooling, ear discharge, ear pain, facial swelling, mouth sores, postnasal drip, rhinorrhea, sinus pressure, sore throat, tinnitus, trouble swallowing and voice change.    Eyes: Negative for photophobia, pain, discharge, redness, itching and visual disturbance.   Respiratory: Negative for apnea, cough, choking, chest tightness, shortness of breath, wheezing and stridor.    Cardiovascular: Negative for chest pain, palpitations and leg swelling.   Gastrointestinal: Negative for abdominal distention, abdominal pain, constipation, diarrhea, nausea and vomiting.   Endocrine: Negative for cold intolerance, heat intolerance, polydipsia, polyphagia and polyuria.   Genitourinary: Negative for decreased  "urine volume, difficulty urinating, dysuria, flank pain, frequency, hematuria and urgency.   Musculoskeletal: Negative for arthralgias, back pain, gait problem, joint swelling, myalgias, neck pain and neck stiffness.   Skin: Negative for color change, pallor, rash and wound.   Allergic/Immunologic: Negative for immunocompromised state.   Neurological: Positive for weakness. Negative for dizziness, tremors, seizures, syncope, facial asymmetry, speech difficulty, light-headedness, numbness and headaches.   Hematological: Negative for adenopathy.   Psychiatric/Behavioral: Negative for agitation, behavioral problems, confusion, decreased concentration, dysphoric mood, hallucinations, self-injury, sleep disturbance and suicidal ideas. The patient is not nervous/anxious and is not hyperactive.         Objective:   BP (!) 160/120 (BP Location: Right arm, Patient Position: Sitting, Cuff Size: Large Adult)   Pulse 90   Ht 192 cm (75.59\")   Wt (!) 178 kg (392 lb)   SpO2 99%   BMI 48.23 kg/m²     Physical Exam   Constitutional: He is oriented to person, place, and time. He appears well-developed and well-nourished. He is cooperative.   HENT:   Head: Normocephalic and atraumatic.   Right Ear: External ear normal.   Left Ear: External ear normal.   Nose: Nose normal.   Mouth/Throat: Oropharynx is clear and moist. No oropharyngeal exudate.   Eyes: Pupils are equal, round, and reactive to light. Conjunctivae and EOM are normal. No scleral icterus. Right eye exhibits normal extraocular motion. Left eye exhibits normal extraocular motion.   Neck: Neck supple. No JVD present. No muscular tenderness present. No tracheal deviation, no edema and no erythema present. No thyromegaly present.   Cardiovascular: Normal rate, regular rhythm, normal heart sounds and intact distal pulses. Exam reveals no gallop and no friction rub.   No murmur heard.  Pulmonary/Chest: Effort normal and breath sounds normal. No stridor. No respiratory " distress. He has no decreased breath sounds. He has no wheezes. He has no rhonchi. He has no rales. He exhibits no tenderness.   Abdominal: Soft. Bowel sounds are normal. He exhibits no distension and no mass. There is no hepatomegaly. There is no tenderness. There is no rebound and no guarding. No hernia.   Musculoskeletal: Normal range of motion. He exhibits no edema, tenderness or deformity.   Sp left bka    Lymphadenopathy:     He has no cervical adenopathy.   Neurological: He is alert and oriented to person, place, and time. He has normal reflexes. No cranial nerve deficit. He exhibits normal muscle tone. Coordination normal.   Skin: Skin is warm. No rash noted. No erythema. No pallor.   Psychiatric: He has a normal mood and affect. His behavior is normal. Judgment and thought content normal.   Nursing note and vitals reviewed.      Lab Review          Assessment/Plan       ICD-10-CM ICD-9-CM   1. Type 2 diabetes mellitus with hyperglycemia, with long-term current use of insulin (CMS/[facility]) E11.65 250.00    Z79.4 790.29     V58.67   2. Hypertension associated with diabetes (CMS/[facility]) E11.59 250.80    I10 401.9   3. Mixed diabetic hyperlipidemia associated with type 2 diabetes mellitus (CMS/[facility]) E11.69 250.80    E78.2 272.2         I reviewed and summarized records from Kayla Chapman APRN from current year  and I reviewed / ordered labs.   From review of records :    Pt has well controlled diabetes with recent toe amputation       Glycemic Management:   Lab Results   Component Value Date    HGBA1C 7.00 (H) 07/13/2020    HGBA1C 8.0 (H) 08/30/2019    HGBA1C 11.5 (H) 07/08/2019     Lab Results   Component Value Date    GLUCOSE 187 (H) 07/17/2020    BUN 16 07/17/2020    CREATININE 1.37 (H) 07/17/2020    EGFRIFNONA 58 (L) 07/17/2020    BCR 11.7 07/17/2020    K 4.2 07/17/2020    CO2 26.0 07/17/2020    CALCIUM 8.7 07/17/2020    ALBUMIN 3.70 07/13/2020    AST 8 07/13/2020    ALT 9 07/13/2020    ANIONGAP 10.0  07/17/2020     Lab Results   Component Value Date    WBC 8.57 07/13/2020    HGB 12.6 (L) 07/13/2020    HCT 37.3 (L) 07/13/2020    MCV 89.0 07/13/2020     07/13/2020      tresiba 85 u qhs     Fiasp doing 22-25 , change to fiasp  Explained 1:3    Trulicity 1.5    Intolerant to metformin     Add jardiance 10 mg daily       Freestyle angella rx , preferred not to since inaccurate but only one time     Lipid Management  Lab Results   Component Value Date    CHOL 251 (H) 09/16/2017     Lab Results   Component Value Date    TRIG 369 (H) 12/03/2018    TRIG 508 (H) 04/11/2018    TRIG 575 (H) 09/16/2017     Lab Results   Component Value Date    HDL 39 (L) 12/03/2018    HDL 44 (L) 04/11/2018    HDL 37 (L) 09/16/2017     No components found for: LDLCALC  Lab Results   Component Value Date     12/03/2018    LDL see below 04/11/2018    LDL  09/16/2017      Comment:      Unable to calculate     No results found for: LDLDIRECT    The ASCVD Risk score ([place] PATEL Jr., et al., 2013) failed to calculate for the following reasons:    The 2013 ASCVD risk score is only valid for ages 40 to 79     pravachol 20 mg qhs     Blood Pressure Management:    Vitals:    07/20/20 0814   BP: (!) 160/120   Pulse: 90   SpO2: 99%     Lab Results   Component Value Date    GLUCOSE 187 (H) 07/17/2020    CALCIUM 8.7 07/17/2020     07/17/2020    K 4.2 07/17/2020    CO2 26.0 07/17/2020     07/17/2020    BUN 16 07/17/2020    CREATININE 1.37 (H) 07/17/2020    EGFRIFNONA 58 (L) 07/17/2020    BCR 11.7 07/17/2020    ANIONGAP 10.0 07/17/2020       bp controlled    On losartan, lopressor, norvasc       Out of meds       Microvascular Complication Monitoring:      Eye Exam Evaluation    Within 1 year ,no retinopathy     -----------    Last Microalbumin-Proteinuria Assessment    No results found for: MALBCRERATIO    No results found for: UTPCR    -----------      Neuropathy    None          Weight Related:   Wt Readings from Last 3 Encounters:    07/20/20 (!) 178 kg (392 lb)   07/17/20 (!) 178 kg (392 lb 6.7 oz)   07/15/20 (!) 178 kg (392 lb 6.7 oz)     Body mass index is 48.23 kg/m².        Diet interventions: moderate (500 kCal/d) deficit diet.      Bone Health    Lab Results   Component Value Date    CALCIUM 8.7 07/17/2020    BTVO16XP 14.0 (L) 09/16/2017       Thyroid Health    Lab Results   Component Value Date    TSH 2.610 12/03/2018    TSH 0.853 09/16/2017            Other Diabetes Related Aspects       Lab Results   Component Value Date    NUVXGSOZ92 494 09/16/2017           Last celiac panel     No results found for: GDPABIGA, TTRANSGLIGA, IGA, [id number]     ANA    Can't afford machine , prefers no referral at this time       No orders of the defined types were placed in this encounter.        A copy of my note was sent to Kayla Chapman APRN    Please see my above opinion and suggestions.

## 2020-07-20 NOTE — TELEPHONE ENCOUNTER
[facility] Transitions Initial Follow Up Call    Outreach made within 2 business days of discharge: Yes    Patient: Grecia Nayak          Patient : 1980   MRN: [id number]  Reason for Admission: Admitted 20 for Acute osteomyelitis of right ankle or foot   Discharge Date:     Discharge Diagnosis: Acute osteomyelitis of right ankle or foot (CMS/[facility])    OSTEOMYELITIS - RIGHT FOOT                  Spoke with: patient     Discharge department/facility: [facility] Interactive Patient Contact:  Was patient able to fill all prescriptions: Yes  Was patient instructed to bring all medications to the follow-up visit: Yes  Is patient taking all medications as directed in the discharge summary? Yes  Does patient understand their discharge instructions: Yes  Does patient have questions or concerns that need addressed prior to 7-14 day follow up office visit: no    I spoke with Mr. Gautam Taylor. He is home and doing great. He states he is in no pain whatsoever. He states he has not had to take a single pain pill since discharge. His foot is wrapped and he wears a boot when ambulating. He states he saw his endocinologist today who refilled all his diabetes medications. His appetite is good. Bowels and bladder are moving ok. He denies any needs at this time and will follow up as scheduled on 20.      Scheduled appointment with PCP within 7-14 days    Follow Up  Future Appointments   Date Time Provider Feroz Porter   2020 12:45 PM Andrea Schirmer, [address]

## 2020-07-21 LAB
BACTERIA SPEC ANAEROBE CULT: NORMAL
BACTERIA SPEC ANAEROBE CULT: NORMAL
CYTO UR: NORMAL
LAB AP CASE REPORT: NORMAL
PATH REPORT.FINAL DX SPEC: NORMAL
PATH REPORT.GROSS SPEC: NORMAL

## 2020-07-21 NOTE — DISCHARGE SUMMARY
[facility] Discharge Summary     Date of Discharge:  7/21/2020    Discharge Diagnosis: Status post partial second age amputation right    Presenting Problem/History of Present Illness  Acute osteomyelitis of right ankle or foot (CMS/[facility]) [M86.171]  Acute osteomyelitis of right ankle or foot (CMS/[facility]) [M86.171]     Hospital Course  Patient is a 40 y.o. male presented with generalized cellulitis right second digit    Procedures Performed  Procedure(s):  AMPUTATION PARTIAL VERSUS TOTAL SECOND DIGIT RIGHT FOOT       Consults:   Consults     Date and Time Order Name Status Description    7/16/2020 0936 Inpatient consult to Infectious Diseases Completed     7/16/2020 0936 Inpatient Consult to Hospitalist Completed           Pertinent Test Results: None    Condition on Discharge: Stable    Vital Signs       Physical Exam:   Physical Exam   Constitutional: He appears well-developed and well-nourished.   HENT:   Head: Normocephalic and atraumatic.   Psychiatric: He has a normal mood and affect. His behavior is normal. Judgment and thought content normal.   Nursing note and vitals reviewed.  Wound is well approximated sutures are intact second is right    Discharge Disposition  Home or Self Care    Discharge Medications     Discharge Medications      New Medications      Instructions Start Date   clindamycin 300 MG capsule  Commonly known as:  Cleocin   300 mg, Oral, 3 Times Daily         Continue These Medications      Instructions Start Date   amLODIPine 5 MG tablet  Commonly known as:  NORVASC   5 mg, Oral, Daily      pravastatin 20 MG tablet  Commonly known as:  Pravachol   20 mg, Oral, Every Evening      ramelteon 8 MG tablet  Commonly known as:  ROZEREM   2 mg, Oral, Nightly PRN         Stop These Medications    doxycycline 100 MG capsule  Commonly known as:  VIBRAMYCIN     mupirocin 2 % ointment  Commonly known as:  BACTROBAN            Discharge Diet:   Diet Instructions     Diet:  Consistent Carbohydrate      Discharge Diet:  Consistent Carbohydrate          Activity at Discharge:   Activity Instructions     Other Activity Instructions      Activity Instructions: Nonweightbearing.          Follow-up Appointments  Future Appointments   Date Time Provider Department Center   7/20/2021  8:00 AM Vinicius Agustin MD [facility] None     Additional Instructions for the Follow-ups that You Need to Schedule     Discharge Follow-up with Specialty: verenice   As directed      Specialty:  verenice    Follow Up Details:  monday afternoon               Test Results Pending at Discharge   Order Current Status    Tissue Pathology Exam In process           Jean Marie Inman DPM  07/21/20  10:30

## 2020-07-22 ENCOUNTER — VIRTUAL VISIT (OUTPATIENT)
Dept: PRIMARY CARE CLINIC | Age: 40
End: 2020-07-22
Payer: COMMERCIAL

## 2020-07-22 PROCEDURE — 99495 TRANSJ CARE MGMT MOD F2F 14D: CPT | Performed by: NURSE PRACTITIONER

## 2020-07-22 PROCEDURE — 1111F DSCHRG MED/CURRENT MED MERGE: CPT | Performed by: NURSE PRACTITIONER

## 2020-07-22 RX ORDER — BLOOD PRESSURE TEST KIT
1 KIT MISCELLANEOUS ONCE
Qty: 1 KIT | Refills: 0 | Status: SHIPPED | OUTPATIENT
Start: 2020-07-22 | End: 2020-07-22

## 2020-07-22 ASSESSMENT — ENCOUNTER SYMPTOMS
COUGH: 0
SINUS PAIN: 0
VOMITING: 0
NAUSEA: 0
ABDOMINAL PAIN: 0
DIARRHEA: 0
SHORTNESS OF BREATH: 0
WHEEZING: 0
EYE PAIN: 0
BACK PAIN: 0
SINUS PRESSURE: 0

## 2020-07-22 NOTE — PROGRESS NOTES
Chief Complaint   Patient presents with    Follow-Up from [address] [place] [address] or [facility] Follow Up      Cornelious Spurling   YOB: 1980    Date of Office Visit:  7/22/2020  Date of Hospital Admission: 7/17/2020  Date of Hospital Discharge: 7/18/2020  Readmission Risk Score(high >=14%. 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    Anxiety    Tachycardia    Reactive depression       Allergies   Allergen Reactions    Januvia [Sitagliptin] Swelling    Niacin And Related Nausea And Vomiting       Medications listed as ordered at the time of discharge from hospital   [place], [address] Medication Instructions TOMER:    Printed on:07/22/20 2679   Medication Information                      amLODIPine (NORVASC) 5 MG tablet  TAKE 1 TABLET BY MOUTH EVERY DAY             aspirin 81 MG EC tablet  Take 1 tablet by mouth daily             Blood Glucose Monitoring Suppl (ONE TOUCH ULTRA 2) w/Device KIT  Test fasting daily. Disp 100 strips and 100 lancets. blood glucose test strips (ONE TOUCH ULTRA TEST) strip  Inject 1 each into the skin 3 times daily As needed.              Blood Pressure KIT  1 Units by Does not apply route once for 1 dose             busPIRone (BUSPAR) 5 MG tablet  Take 1 tablet by mouth 3 times daily             Cinnamon 500 MG CAPS  Take 1,000 mg by mouth 2 times daily             Dulaglutide (TRULICITY) 1.5 QE/7.0JB SOPN  Inject 1.5 mg into the skin once a week             empagliflozin (JARDIANCE) 10 MG tablet  Take 1 tablet by mouth daily             furosemide (LASIX) 20 MG tablet  Take 1 tablet by mouth daily for 3 days             Insulin Aspart, w/Niacinamide, (FIASP FLEXTOUCH) 100 UNIT/ML SOPN  Inject 5 Units into the skin 3 times daily (with meals)             Insulin Degludec (TRESIBA FLEXTOUCH) 100 UNIT/ML SOPN  Inject 80 Units into the skin nightly             Insulin Pen Needle (appAttachOGER PEN NEEDLES 31G) 31G X 8 MM MISC  1 each by Does not apply route daily             losartan (COZAAR) 50 MG tablet  Take 1 tablet by mouth 2 times daily             metoprolol tartrate (LOPRESSOR) 25 MG tablet  Take 1 tablet by mouth 2 times daily             pregabalin (LYRICA) 50 MG capsule  Take 50 mg by mouth 2 times daily.               ramelteon (ROZEREM) 8 MG tablet  Take 1 tablet by mouth nightly as needed for Sleep             sertraline (ZOLOFT) 25 MG tablet  Take 1 tablet by mouth daily             sodium chloride 0.9 % SOLN 50 mL with ceFEPIme 2 g SOLR 2 g  Infuse 2 g intravenously every 12 hours             Turmeric Curcumin 500 MG CAPS  Take 1 capsule by mouth 2 times daily             vancomycin (VANCOCIN)  Infuse 1,500 mg intravenously every 8 hours             vitamin C (ASCORBIC ACID) 500 MG tablet  Take 1,000 mg by mouth daily                   Medications marked \"taking\" at this time  Outpatient Medications Marked as Taking for the 7/22/20 encounter (Virtual Visit) with ELENA Marley   Medication Sig Dispense Refill    Blood Pressure KIT 1 Units by Does not apply route once for 1 dose 1 kit 0        Medications patient taking as of now reconciled against medications ordered at time of hospital discharge: Yes    Chief Complaint   Patient presents with    Follow-Up from Hospital       HPI    Mr. Maria M Cortez is following up on a hospital stay at [facility] for osteomyelitis of the right second toe. He had previously been treated outpatient and this had improved, but it gradually worsened and he went to the ED. On 7/17 he had amputation of his right second toe. He was hospitalized overnight.  he is currently wearing a dressing on this foot and uses a boot when ambulating. He is taking clindamycin for 3 weeks. He has a follow-up with Dr. Kacey Fish next week. He saw endocrinology Monday. His A1c was 7. Endocrinology refilled his medication for 1 year he will return there in 1 year. His glucose was 117 this a.m. It is noted that his blood pressure was 160/120 when hospitalized. He states he had missed several days of his blood pressure medication. He denies any symptoms. He does not have a way to check his blood pressure at home. He does have a doctor appointment to check next week. Inpatient course: Discharge summary reviewed- see chart. Interval history/Current status: fair    Review of Systems   Constitutional: Negative for appetite change, fatigue and fever. HENT: Negative for congestion, sinus pressure and sinus pain. Eyes: Negative for pain and visual disturbance. Respiratory: Negative for cough, shortness of breath and wheezing. Cardiovascular: Negative for chest pain and leg swelling. Gastrointestinal: Negative for abdominal pain, diarrhea, nausea and vomiting. Endocrine: Negative for cold intolerance and heat intolerance. Genitourinary: Negative for dysuria, frequency and urgency. Musculoskeletal: Negative for arthralgias and back pain. Skin: Positive for wound (dressing to right foot). Negative for rash. Neurological: Negative for dizziness, weakness and headaches. Hematological: Negative for adenopathy. Psychiatric/Behavioral: Negative for dysphoric mood and sleep disturbance. The patient is not nervous/anxious. There were no vitals filed for this visit.   There is no height or weight on file to calculate BMI.   Wt Readings from Last 3 Encounters:   10/28/19 (!) 357 lb (161.9 kg)   09/28/19 (!) 360 lb (163.3 kg)   09/09/19 (!) 378 lb (171.5 kg)     BP Readings from Last 3 Encounters:   10/28/19 122/84   09/28/19 (!) 143/61   09/09/19 (!) 154/100       Physical Exam    He was unable to get video working due to lack of Internet. This call was audio only. Patient alert, oriented x 3. Assessment/Plan:    Follow-up with specialist as scheduled. Patient to call if blood pressure still elevated after specialist visit next week. He states he did forget medication before going to the hospital the day it was checked and it was 160/120.     1. Hospital discharge follow-up    - NH DISCHARGE MEDS RECONCILED W/ CURRENT OUTPATIENT MED LIST    2. S/P amputation of lesser toe, right (HCC)    - NH DISCHARGE MEDS RECONCILED W/ CURRENT OUTPATIENT MED LIST    3. Essential hypertension      4. Type 2 diabetes mellitus with other circulatory complication, with long-term current use of insulin [facility])          Medical Decision Making: moderate complexity    This is a video visit  audio was used. Patient does verbalize understand of the video visit and consents to complete visit today via video visit. Patient verbalizes that they are currently located at their home. Only participants of this visit today are myself, ELENA Turpin and the patient listed.

## 2020-08-11 RX ORDER — RAMELTEON 8 MG/1
8 TABLET ORAL NIGHTLY PRN
Qty: 30 TABLET | Refills: 0 | Status: SHIPPED | OUTPATIENT
Start: 2020-08-11 | End: 2020-09-10

## 2020-08-16 RX ORDER — PEN NEEDLE, DIABETIC 31 GX5/16"
NEEDLE, DISPOSABLE MISCELLANEOUS
Qty: 100 EACH | Refills: 3 | Status: SHIPPED | OUTPATIENT
Start: 2020-08-16

## 2021-01-01 ENCOUNTER — TRANSCRIBE ORDERS (OUTPATIENT)
Dept: ADMINISTRATIVE | Facility: HOSPITAL | Age: 41
End: 2021-01-01

## 2021-01-01 ENCOUNTER — HOSPITAL ENCOUNTER (OUTPATIENT)
Dept: GENERAL RADIOLOGY | Facility: HOSPITAL | Age: 41
Discharge: HOME | End: 2021-10-01

## 2021-01-01 ENCOUNTER — HOSPITAL ENCOUNTER (OUTPATIENT)
Dept: GENERAL RADIOLOGY | Facility: HOSPITAL | Age: 41
Discharge: HOME | End: 2021-09-21

## 2021-01-01 DIAGNOSIS — Z02.1 PRE-EMPLOYMENT EXAMINATION: Primary | ICD-10-CM

## 2021-01-01 DIAGNOSIS — E11.42 TYPE 2 DIABETES MELLITUS WITH POLYNEUROPATHY (HCC): Primary | ICD-10-CM

## 2021-01-01 DIAGNOSIS — N17.9 ACUTE RENAL FAILURE, UNSPECIFIED ACUTE RENAL FAILURE TYPE (HCC): Primary | ICD-10-CM

## 2021-01-01 PROCEDURE — 71045 X-RAY EXAM CHEST 1 VIEW: CPT

## 2021-01-01 PROCEDURE — 71046 X-RAY EXAM CHEST 2 VIEWS: CPT

## 2021-01-01 RX ORDER — INSULIN DEGLUDEC 200 U/ML
100 INJECTION, SOLUTION SUBCUTANEOUS NIGHTLY
Qty: 15 PEN | Refills: 3 | Status: SHIPPED | OUTPATIENT
Start: 2021-01-01

## 2021-03-11 RX ORDER — AMLODIPINE BESYLATE 5 MG/1
5 TABLET ORAL DAILY
Qty: 30 TABLET | Refills: 5 | Status: SHIPPED | OUTPATIENT
Start: 2021-03-11 | End: 2021-03-12 | Stop reason: SDUPTHER

## 2021-03-12 RX ORDER — AMLODIPINE BESYLATE 5 MG/1
5 TABLET ORAL DAILY
Qty: 90 TABLET | Refills: 3 | Status: SHIPPED | OUTPATIENT
Start: 2021-03-12

## 2021-03-17 ENCOUNTER — TELEPHONE (OUTPATIENT)
Dept: ENDOCRINOLOGY | Facility: CLINIC | Age: 41
End: 2021-03-17

## 2021-03-17 NOTE — TELEPHONE ENCOUNTER
Pt called stating that his insurance is requiring a PA on [id number] [place] - [address] AT [facility] [address]  [phone] [facility] [phone] FX

## 2021-04-29 ENCOUNTER — TELEPHONE (OUTPATIENT)
Dept: ENDOCRINOLOGY | Facility: CLINIC | Age: 41
End: 2021-04-29

## 2021-04-29 ENCOUNTER — DOCUMENTATION (OUTPATIENT)
Dept: ENDOCRINOLOGY | Facility: CLINIC | Age: 41
End: 2021-04-29

## 2021-04-29 NOTE — PROGRESS NOTES
Submitted wrong pen on last note and couldn't ifgure out how to edit it here is the correct key for the pa for repatha:    KEY:[id number]

## 2021-05-25 ENCOUNTER — TELEPHONE (OUTPATIENT)
Dept: PRIMARY CARE CLINIC | Age: 41
End: 2021-05-25

## 2021-05-25 NOTE — TELEPHONE ENCOUNTER
I spoke to pt in regards to an appointment.  Pt stated that we do not except his insurance and he is seeing someone else

## 2022-01-01 ENCOUNTER — APPOINTMENT (OUTPATIENT)
Dept: GENERAL RADIOLOGY | Facility: HOSPITAL | Age: 42
End: 2022-01-01

## 2022-01-01 ENCOUNTER — APPOINTMENT (OUTPATIENT)
Dept: CT IMAGING | Facility: HOSPITAL | Age: 42
End: 2022-01-01

## 2022-01-01 ENCOUNTER — HOSPITAL ENCOUNTER (OUTPATIENT)
Dept: ULTRASOUND IMAGING | Facility: HOSPITAL | Age: 42
Discharge: HOME | End: 2022-03-25
Admitting: INTERNAL MEDICINE

## 2022-01-01 ENCOUNTER — TELEPHONE (OUTPATIENT)
Dept: VASCULAR SURGERY | Facility: CLINIC | Age: 42
End: 2022-01-01

## 2022-01-01 ENCOUNTER — TRANSCRIBE ORDERS (OUTPATIENT)
Dept: ADMINISTRATIVE | Facility: HOSPITAL | Age: 42
End: 2022-01-01

## 2022-01-01 ENCOUNTER — HOSPITAL ENCOUNTER (EMERGENCY)
Facility: HOSPITAL | Age: 42
End: 2022-07-31
Attending: EMERGENCY MEDICINE | Admitting: EMERGENCY MEDICINE

## 2022-01-01 ENCOUNTER — OFFICE VISIT (OUTPATIENT)
Dept: VASCULAR SURGERY | Facility: CLINIC | Age: 42
End: 2022-01-01

## 2022-01-01 VITALS — WEIGHT: 300 LBS

## 2022-01-01 VITALS
SYSTOLIC BLOOD PRESSURE: 140 MMHG | HEIGHT: 75 IN | BODY MASS INDEX: 39.17 KG/M2 | DIASTOLIC BLOOD PRESSURE: 88 MMHG | WEIGHT: 315 LBS

## 2022-01-01 DIAGNOSIS — E11.65 TYPE 2 DIABETES MELLITUS WITH HYPERGLYCEMIA, WITH LONG-TERM CURRENT USE OF INSULIN: ICD-10-CM

## 2022-01-01 DIAGNOSIS — E11.59 HYPERTENSION ASSOCIATED WITH DIABETES: ICD-10-CM

## 2022-01-01 DIAGNOSIS — I15.2 HYPERTENSION ASSOCIATED WITH DIABETES: ICD-10-CM

## 2022-01-01 DIAGNOSIS — E78.2 MIXED DIABETIC HYPERLIPIDEMIA ASSOCIATED WITH TYPE 2 DIABETES MELLITUS: ICD-10-CM

## 2022-01-01 DIAGNOSIS — M86.171 ACUTE OSTEOMYELITIS OF RIGHT ANKLE OR FOOT: Primary | ICD-10-CM

## 2022-01-01 DIAGNOSIS — E11.69 MIXED DIABETIC HYPERLIPIDEMIA ASSOCIATED WITH TYPE 2 DIABETES MELLITUS: ICD-10-CM

## 2022-01-01 DIAGNOSIS — N18.30 STAGE 3 CHRONIC KIDNEY DISEASE, UNSPECIFIED WHETHER STAGE 3A OR 3B CKD: Primary | ICD-10-CM

## 2022-01-01 DIAGNOSIS — Z79.4 TYPE 2 DIABETES MELLITUS WITH HYPERGLYCEMIA, WITH LONG-TERM CURRENT USE OF INSULIN: ICD-10-CM

## 2022-01-01 DIAGNOSIS — I46.9 CARDIAC ARREST: ICD-10-CM

## 2022-01-01 DIAGNOSIS — R56.9 SEIZURE: Primary | ICD-10-CM

## 2022-01-01 LAB
ALBUMIN SERPL-MCNC: 4.2 G/DL (ref 3.5–5.2)
ALBUMIN/GLOB SERPL: 1.3 G/DL
ALP SERPL-CCNC: 62 U/L (ref 39–117)
ALT SERPL W P-5'-P-CCNC: 18 U/L (ref 1–41)
ANION GAP SERPL CALCULATED.3IONS-SCNC: 16 MMOL/L (ref 5–15)
AST SERPL-CCNC: 16 U/L (ref 1–40)
BASOPHILS # BLD MANUAL: 0.15 10*3/MM3 (ref 0–0.2)
BASOPHILS NFR BLD MANUAL: 1 % (ref 0–1.5)
BILIRUB SERPL-MCNC: 0.3 MG/DL (ref 0–1.2)
BUN SERPL-MCNC: 27 MG/DL (ref 6–20)
BUN/CREAT SERPL: 11.6 (ref 7–25)
CALCIUM SPEC-SCNC: 9.5 MG/DL (ref 8.6–10.5)
CHLORIDE SERPL-SCNC: 104 MMOL/L (ref 98–107)
CO2 SERPL-SCNC: 18 MMOL/L (ref 22–29)
CREAT SERPL-MCNC: 2.33 MG/DL (ref 0.76–1.27)
CRP SERPL-MCNC: <0.3 MG/DL (ref 0–0.5)
D-LACTATE SERPL-SCNC: 6.3 MMOL/L (ref 0.5–2)
DEPRECATED RDW RBC AUTO: 47.6 FL (ref 37–54)
EGFRCR SERPLBLD CKD-EPI 2021: 35.1 ML/MIN/1.73
EOSINOPHIL # BLD MANUAL: 0.75 10*3/MM3 (ref 0–0.4)
EOSINOPHIL NFR BLD MANUAL: 5.1 % (ref 0.3–6.2)
ERYTHROCYTE [DISTWIDTH] IN BLOOD BY AUTOMATED COUNT: 13.2 % (ref 12.3–15.4)
ETHANOL UR QL: <0.01 %
GLOBULIN UR ELPH-MCNC: 3.3 GM/DL
GLUCOSE BLDC GLUCOMTR-MCNC: 97 MG/DL (ref 70–130)
GLUCOSE SERPL-MCNC: 193 MG/DL (ref 65–99)
HCT VFR BLD AUTO: 43 % (ref 37.5–51)
HGB BLD-MCNC: 13.2 G/DL (ref 13–17.7)
HOLD SPECIMEN: NORMAL
LYMPHOCYTES # BLD MANUAL: 4.65 10*3/MM3 (ref 0.7–3.1)
LYMPHOCYTES NFR BLD MANUAL: 5.1 % (ref 5–12)
MAGNESIUM SERPL-MCNC: 2.2 MG/DL (ref 1.6–2.6)
MCH RBC QN AUTO: 29.8 PG (ref 26.6–33)
MCHC RBC AUTO-ENTMCNC: 30.7 G/DL (ref 31.5–35.7)
MCV RBC AUTO: 97.1 FL (ref 79–97)
MONOCYTES # BLD: 0.75 10*3/MM3 (ref 0.1–0.9)
NEUTROPHILS # BLD AUTO: 8.39 10*3/MM3 (ref 1.7–7)
NEUTROPHILS NFR BLD MANUAL: 56.1 % (ref 42.7–76)
NEUTS BAND NFR BLD MANUAL: 1 % (ref 0–5)
NEUTS VAC BLD QL SMEAR: ABNORMAL
NT-PROBNP SERPL-MCNC: 99.6 PG/ML (ref 0–450)
PHOSPHATE SERPL-MCNC: 4.3 MG/DL (ref 2.5–4.5)
PLAT MORPH BLD: NORMAL
PLATELET # BLD AUTO: 268 10*3/MM3 (ref 140–450)
PMV BLD AUTO: 10.9 FL (ref 6–12)
POIKILOCYTOSIS BLD QL SMEAR: ABNORMAL
POTASSIUM SERPL-SCNC: 4 MMOL/L (ref 3.5–5.2)
PROT SERPL-MCNC: 7.5 G/DL (ref 6–8.5)
RBC # BLD AUTO: 4.43 10*6/MM3 (ref 4.14–5.8)
SODIUM SERPL-SCNC: 138 MMOL/L (ref 136–145)
VARIANT LYMPHS NFR BLD MANUAL: 28.6 % (ref 19.6–45.3)
VARIANT LYMPHS NFR BLD MANUAL: 3.1 % (ref 0–5)
WBC NRBC COR # BLD: 14.68 10*3/MM3 (ref 3.4–10.8)
WHOLE BLOOD HOLD COAG: NORMAL
WHOLE BLOOD HOLD SPECIMEN: NORMAL

## 2022-01-01 PROCEDURE — 25010000002 MIDAZOLAM 50 MG/10ML SOLUTION 10 ML VIAL: Performed by: EMERGENCY MEDICINE

## 2022-01-01 PROCEDURE — 80053 COMPREHEN METABOLIC PANEL: CPT | Performed by: EMERGENCY MEDICINE

## 2022-01-01 PROCEDURE — 99284 EMERGENCY DEPT VISIT MOD MDM: CPT

## 2022-01-01 PROCEDURE — 92950 HEART/LUNG RESUSCITATION CPR: CPT

## 2022-01-01 PROCEDURE — 83605 ASSAY OF LACTIC ACID: CPT | Performed by: EMERGENCY MEDICINE

## 2022-01-01 PROCEDURE — 96375 TX/PRO/DX INJ NEW DRUG ADDON: CPT

## 2022-01-01 PROCEDURE — 25010000002 EPINEPHRINE 1 MG/ML SOLUTION 1 ML AMPULE: Performed by: EMERGENCY MEDICINE

## 2022-01-01 PROCEDURE — 25010000002 MAGNESIUM SULFATE IN D5W 1G/100ML (PREMIX) 1-5 GM/100ML-% SOLUTION

## 2022-01-01 PROCEDURE — 99204 OFFICE O/P NEW MOD 45 MIN: CPT | Performed by: SURGERY

## 2022-01-01 PROCEDURE — 25010000002 AMIODARONE PER 30 MG: Performed by: EMERGENCY MEDICINE

## 2022-01-01 PROCEDURE — 85025 COMPLETE CBC W/AUTO DIFF WBC: CPT | Performed by: EMERGENCY MEDICINE

## 2022-01-01 PROCEDURE — 94799 UNLISTED PULMONARY SVC/PX: CPT

## 2022-01-01 PROCEDURE — 71045 X-RAY EXAM CHEST 1 VIEW: CPT

## 2022-01-01 PROCEDURE — 83880 ASSAY OF NATRIURETIC PEPTIDE: CPT | Performed by: EMERGENCY MEDICINE

## 2022-01-01 PROCEDURE — 25010000002 EPINEPHRINE 1 MG/10ML SOLUTION PREFILLED SYRINGE: Performed by: EMERGENCY MEDICINE

## 2022-01-01 PROCEDURE — 76775 US EXAM ABDO BACK WALL LIM: CPT

## 2022-01-01 PROCEDURE — 84100 ASSAY OF PHOSPHORUS: CPT | Performed by: EMERGENCY MEDICINE

## 2022-01-01 PROCEDURE — 93010 ELECTROCARDIOGRAM REPORT: CPT | Performed by: INTERNAL MEDICINE

## 2022-01-01 PROCEDURE — 96365 THER/PROPH/DIAG IV INF INIT: CPT

## 2022-01-01 PROCEDURE — 94002 VENT MGMT INPAT INIT DAY: CPT

## 2022-01-01 PROCEDURE — 87040 BLOOD CULTURE FOR BACTERIA: CPT | Performed by: EMERGENCY MEDICINE

## 2022-01-01 PROCEDURE — 25010000002 PROPOFOL 10 MG/ML EMULSION

## 2022-01-01 PROCEDURE — 85007 BL SMEAR W/DIFF WBC COUNT: CPT | Performed by: EMERGENCY MEDICINE

## 2022-01-01 PROCEDURE — 99285 EMERGENCY DEPT VISIT HI MDM: CPT

## 2022-01-01 PROCEDURE — 25010000002 CALCIUM GLUCONATE PER 10 ML: Performed by: EMERGENCY MEDICINE

## 2022-01-01 PROCEDURE — 25010000002 PROPOFOL 10 MG/ML EMULSION: Performed by: EMERGENCY MEDICINE

## 2022-01-01 PROCEDURE — 31500 INSERT EMERGENCY AIRWAY: CPT

## 2022-01-01 PROCEDURE — 86140 C-REACTIVE PROTEIN: CPT | Performed by: EMERGENCY MEDICINE

## 2022-01-01 PROCEDURE — 82077 ASSAY SPEC XCP UR&BREATH IA: CPT | Performed by: EMERGENCY MEDICINE

## 2022-01-01 PROCEDURE — 36415 COLL VENOUS BLD VENIPUNCTURE: CPT

## 2022-01-01 PROCEDURE — 83735 ASSAY OF MAGNESIUM: CPT | Performed by: EMERGENCY MEDICINE

## 2022-01-01 PROCEDURE — 25010000002 LORAZEPAM PER 2 MG

## 2022-01-01 PROCEDURE — 93005 ELECTROCARDIOGRAM TRACING: CPT | Performed by: EMERGENCY MEDICINE

## 2022-01-01 PROCEDURE — 82962 GLUCOSE BLOOD TEST: CPT

## 2022-01-01 RX ORDER — EPINEPHRINE 0.1 MG/ML
SYRINGE (ML) INJECTION
Status: COMPLETED | OUTPATIENT
Start: 2022-01-01 | End: 2022-01-01

## 2022-01-01 RX ORDER — SODIUM CHLORIDE 0.9 % (FLUSH) 0.9 %
10 SYRINGE (ML) INJECTION AS NEEDED
Status: DISCONTINUED | OUTPATIENT
Start: 2022-01-01 | End: 2022-01-01 | Stop reason: HOSPADM

## 2022-01-01 RX ORDER — AMIODARONE HYDROCHLORIDE 50 MG/ML
INJECTION, SOLUTION INTRAVENOUS
Status: COMPLETED | OUTPATIENT
Start: 2022-01-01 | End: 2022-01-01

## 2022-01-01 RX ORDER — PROPOFOL 10 MG/ML
100 VIAL (ML) INTRAVENOUS ONCE
Status: COMPLETED | OUTPATIENT
Start: 2022-01-01 | End: 2022-01-01

## 2022-01-01 RX ORDER — MAGNESIUM SULFATE 1 G/100ML
INJECTION INTRAVENOUS
Status: COMPLETED
Start: 2022-01-01 | End: 2022-01-01

## 2022-01-01 RX ORDER — IRBESARTAN 300 MG/1
300 TABLET ORAL TAKE AS DIRECTED
COMMUNITY
Start: 2022-01-01

## 2022-01-01 RX ORDER — PROPOFOL 10 MG/ML
VIAL (ML) INTRAVENOUS
Status: COMPLETED | OUTPATIENT
Start: 2022-01-01 | End: 2022-01-01

## 2022-01-01 RX ORDER — ERGOCALCIFEROL 1.25 MG/1
50000 CAPSULE ORAL WEEKLY
COMMUNITY
Start: 2022-01-01

## 2022-01-01 RX ORDER — ROCURONIUM BROMIDE 10 MG/ML
INJECTION, SOLUTION INTRAVENOUS
Status: COMPLETED | OUTPATIENT
Start: 2022-01-01 | End: 2022-01-01

## 2022-01-01 RX ORDER — CALCIUM GLUCONATE 94 MG/ML
INJECTION, SOLUTION INTRAVENOUS
Status: COMPLETED | OUTPATIENT
Start: 2022-01-01 | End: 2022-01-01

## 2022-01-01 RX ORDER — LORAZEPAM 2 MG/ML
INJECTION INTRAMUSCULAR
Status: COMPLETED
Start: 2022-01-01 | End: 2022-01-01

## 2022-01-01 RX ORDER — BUSPIRONE HYDROCHLORIDE 5 MG/1
5 TABLET ORAL 3 TIMES DAILY
COMMUNITY

## 2022-01-01 RX ORDER — ETOMIDATE 2 MG/ML
INJECTION INTRAVENOUS
Status: COMPLETED | OUTPATIENT
Start: 2022-01-01 | End: 2022-01-01

## 2022-01-01 RX ORDER — ROSUVASTATIN CALCIUM 10 MG/1
10 TABLET, COATED ORAL
COMMUNITY
Start: 2022-01-01

## 2022-01-01 RX ORDER — FENOFIBRATE 145 MG/1
145 TABLET, COATED ORAL
COMMUNITY
Start: 2022-01-01

## 2022-01-01 RX ORDER — PROPOFOL 10 MG/ML
VIAL (ML) INTRAVENOUS
Status: COMPLETED
Start: 2022-01-01 | End: 2022-01-01

## 2022-01-01 RX ADMIN — ETOMIDATE 40 MG: 2 INJECTION, SOLUTION INTRAVENOUS at 10:04

## 2022-01-01 RX ADMIN — CALCIUM GLUCONATE 1 G: 98 INJECTION, SOLUTION INTRAVENOUS at 11:01

## 2022-01-01 RX ADMIN — SODIUM BICARBONATE 50 MEQ: 84 INJECTION INTRAVENOUS at 11:13

## 2022-01-01 RX ADMIN — EPINEPHRINE 1 MG: 0.1 INJECTION INTRACARDIAC; INTRAVENOUS at 11:17

## 2022-01-01 RX ADMIN — EPINEPHRINE 1 MG: 0.1 INJECTION INTRACARDIAC; INTRAVENOUS at 11:09

## 2022-01-01 RX ADMIN — EPINEPHRINE 1 MG: 0.1 INJECTION INTRACARDIAC; INTRAVENOUS at 11:12

## 2022-01-01 RX ADMIN — EPINEPHRINE 1 MG: 0.1 INJECTION INTRACARDIAC; INTRAVENOUS at 11:10

## 2022-01-01 RX ADMIN — MIDAZOLAM 1 MG/HR: 5 INJECTION INTRAMUSCULAR; INTRAVENOUS at 10:44

## 2022-01-01 RX ADMIN — PROPOFOL 100 MG: 10 INJECTION, EMULSION INTRAVENOUS at 10:41

## 2022-01-01 RX ADMIN — EPINEPHRINE 1 MG: 0.1 INJECTION INTRACARDIAC; INTRAVENOUS at 11:15

## 2022-01-01 RX ADMIN — EPINEPHRINE 0.02 MCG/KG/MIN: 1 INJECTION, SOLUTION, CONCENTRATE INTRAVENOUS at 11:15

## 2022-01-01 RX ADMIN — SODIUM CHLORIDE 2000 ML: 9 INJECTION, SOLUTION INTRAVENOUS at 10:50

## 2022-01-01 RX ADMIN — EPINEPHRINE 1 MG: 0.1 INJECTION INTRACARDIAC; INTRAVENOUS at 11:24

## 2022-01-01 RX ADMIN — PROPOFOL 100 MG: 10 INJECTION, EMULSION INTRAVENOUS at 10:34

## 2022-01-01 RX ADMIN — AMIODARONE HYDROCHLORIDE 150 MG: 50 INJECTION, SOLUTION INTRAVENOUS at 11:10

## 2022-01-01 RX ADMIN — AMIODARONE HYDROCHLORIDE 300 MG: 50 INJECTION, SOLUTION INTRAVENOUS at 11:09

## 2022-01-01 RX ADMIN — ROCURONIUM BROMIDE 50 MG: 50 INJECTION INTRAVENOUS at 10:44

## 2022-01-01 RX ADMIN — EPINEPHRINE 1 MG: 0.1 INJECTION INTRACARDIAC; INTRAVENOUS at 10:59

## 2022-01-01 RX ADMIN — Medication 100 MG: at 10:34

## 2022-01-01 RX ADMIN — EPINEPHRINE 1 MG: 0.1 INJECTION INTRACARDIAC; INTRAVENOUS at 11:20

## 2022-01-01 RX ADMIN — ETOMIDATE 40 MG: 2 INJECTION, SOLUTION INTRAVENOUS at 10:11

## 2022-01-01 RX ADMIN — MAGNESIUM SULFATE IN DEXTROSE: 10 INJECTION, SOLUTION INTRAVENOUS at 11:15

## 2022-01-01 RX ADMIN — LORAZEPAM 1 MG: 2 INJECTION INTRAMUSCULAR; INTRAVENOUS at 10:02

## 2022-01-01 RX ADMIN — EPINEPHRINE 1 MG: 0.1 INJECTION INTRACARDIAC; INTRAVENOUS at 11:02

## 2022-01-01 RX ADMIN — EPINEPHRINE 1 MG: 0.1 INJECTION INTRACARDIAC; INTRAVENOUS at 10:56

## 2022-03-16 NOTE — TELEPHONE ENCOUNTER
Spoke with Mr Peters reminding him of his appointment for Thursday, March 17th, 2022 at 11 am with Dr Ramirez. Mr Peters confirmed he would be here.

## 2022-03-17 NOTE — PROGRESS NOTES
03/17/2022      Jean Marie Inman DPM  [address] [place] [address]    Andrzej Peters  1980    Chief Complaint   Patient presents with   • Establish Care     Referred over by Dr Frankie Tejeda for 2nd Opinion Amputation. Patient denies any stroke like symptoms.    • Non SMoker     Patient is a Non Smoker    • Med Management     Verbally verified medications with patient        Dear Jean Marie Inman DPM    HPI  I had the pleasure of seeing your patient Andrzej Peters in the office today.  Thank you kindly for this consultation.  As you recall, Andrzej Peters is a 41 y.o.  male who you are currently following for charcot foot.  He had left below knee amputation performed in [place] with Dr. Bruce. He does well with his prosthetic.  Currently, he is in a boot and has evidence of ulceration on the right foot.  He is here do discuss options for below knee amputation.       Past Medical History:   Diagnosis Date   • Anxiety    • Back pain    • Depression    • Diarrhea    • GERD (gastroesophageal reflux disease)    • Hypertension associated with diabetes ([facility]) 11/14/2019   • Mixed diabetic hyperlipidemia associated with type 2 diabetes mellitus ([facility]) 11/14/2019   • Sleep apnea     NO MACHINE   • Type 2 diabetes mellitus with hyperglycemia, with long-term current use of insulin ([facility]) 11/14/2019       Past Surgical History:   Procedure Laterality Date   • AMPUTATION DIGIT Right 7/16/2020    Procedure: AMPUTATION PARTIAL VERSUS TOTAL SECOND DIGIT RIGHT FOOT;  Surgeon: Jean Marie Inman DPM;  Location: [facility];  Service: Podiatry;  Laterality: Right;   • BELOW KNEE AMPUTATION Left 08/2019   • ENDOSCOPY      stretched esophagus   • TONSILLECTOMY         Family History   Problem Relation Age of Onset   • Diabetes Mother        Social History     Socioeconomic History   • Marital status:    Tobacco Use   • Smoking status: Never Smoker   • Smokeless tobacco: Never Used   Substance and Sexual Activity   •  Alcohol use: No   • Drug use: No   • Sexual activity: Defer       Allergies   Allergen Reactions   • Sitagliptin Anaphylaxis and Swelling   • Codeine Palpitations   • Niacin And Related Nausea And Vomiting         Current Outpatient Medications:   •  amLODIPine (NORVASC) 5 MG tablet, Take 1 tablet by mouth Daily., Disp: 90 tablet, Rfl: 3  •  Ascorbic Acid 500 MG chewable tablet, Chew 1,000 mg Daily., Disp: , Rfl:   •  busPIRone (BUSPAR) 5 MG tablet, Take 5 mg by mouth 3 (Three) Times a Day., Disp: , Rfl:   •  clindamycin (Cleocin) 300 MG capsule, Take 1 capsule by mouth 3 (Three) Times a Day., Disp: 30 capsule, Rfl: 0  •  Dulaglutide 1.5 MG/0.5ML solution pen-injector, Inject 1.5 mg under the skin into the appropriate area as directed 1 (One) Time Per Week., Disp: 12 pen, Rfl: 11  •  Empagliflozin (Jardiance) 10 MG tablet, Take 10 mg by mouth Daily. Before bkfast, Disp: 90 tablet, Rfl: 3  •  fenofibrate (TRICOR) 145 MG tablet, Take 145 mg by mouth every night at bedtime., Disp: , Rfl:   •  Insulin aspart (FIASP FLEXTOUCH) 100 UNIT/ML solution pen-injector injection pen, UP TO 30 UNITS QAC TID, Disp: 27 pen, Rfl: 11  •  Insulin Degludec (Tresiba FlexTouch) 200 UNIT/ML solution pen-injector pen injection, Inject 100 Units under the skin into the appropriate area as directed Every Night., Disp: 15 pen, Rfl: 3  •  irbesartan (AVAPRO) 300 MG tablet, Take 300 mg by mouth Take As Directed., Disp: , Rfl:   •  ramelteon (ROZEREM) 8 MG tablet, Take 2 mg by mouth At Night As Needed for Sleep., Disp: , Rfl:   •  rosuvastatin (CRESTOR) 10 MG tablet, Take 10 mg by mouth every night at bedtime., Disp: , Rfl:   •  vitamin D (ERGOCALCIFEROL) 1.25 MG (90249 UT) capsule capsule, Take 50,000 Units by mouth 1 (One) Time Per Week., Disp: , Rfl:     Review of Systems   Constitutional: Negative.    HENT: Negative.    Eyes: Negative.    Respiratory: Negative.    Cardiovascular: Negative.    Gastrointestinal: Negative.    Endocrine:  "Negative.    Genitourinary: Negative.    Musculoskeletal: Negative.    Skin: Positive for wound (right foot, top and botom of foot).   Allergic/Immunologic: Negative.    Neurological: Negative.    Hematological: Negative.    Psychiatric/Behavioral: Negative.    All other systems reviewed and are negative.    /88 (BP Location: Right arm, Patient Position: Sitting, Cuff Size: Adult)   Ht 190.5 cm (75\")   Wt (!) 178 kg (392 lb)   BMI 49.00 kg/m²     Physical Exam  Vitals and nursing note reviewed.   Constitutional:       Appearance: Normal appearance. He is well-developed.   HENT:      Head: Normocephalic and atraumatic.   Eyes:      General: No scleral icterus.     Pupils: Pupils are equal, round, and reactive to light.   Neck:      Thyroid: No thyromegaly.      Vascular: No carotid bruit or JVD.   Cardiovascular:      Rate and Rhythm: Normal rate and regular rhythm.      Heart sounds: Normal heart sounds.      Comments: Left BKA, wounds to right foot  Pulmonary:      Effort: Pulmonary effort is normal.      Breath sounds: Normal breath sounds.   Abdominal:      General: Bowel sounds are normal. There is no distension or abdominal bruit.      Palpations: Abdomen is soft. There is no mass.      Tenderness: There is no abdominal tenderness.   Musculoskeletal:         General: Normal range of motion.      Cervical back: Neck supple.   Lymphadenopathy:      Cervical: No cervical adenopathy.   Skin:     General: Skin is warm and dry.   Neurological:      Mental Status: He is alert and oriented to person, place, and time.      Cranial Nerves: No cranial nerve deficit.      Sensory: No sensory deficit.   Psychiatric:         Mood and Affect: Mood normal.         Behavior: Behavior normal.         Thought Content: Thought content normal.         Judgment: Judgment normal.         Patient Active Problem List   Diagnosis   • Type 2 diabetes mellitus with hyperglycemia, with long-term current use of insulin (HCC)   • " Hypertension associated with diabetes (HCC)   • Mixed diabetic hyperlipidemia associated with type 2 diabetes mellitus (HCC)   • Acute osteomyelitis of right ankle or foot (HCC)        Diagnosis Plan   1. Acute osteomyelitis of right ankle or foot (HCC)     2. Hypertension associated with diabetes (HCC)     3. Mixed diabetic hyperlipidemia associated with type 2 diabetes mellitus (HCC)     4. Type 2 diabetes mellitus with hyperglycemia, with long-term current use of insulin (HCC)         Plan: After thoroughly evaluating Andrzej Peters, I believe the best course of action is to proceed with a right below-knee amputation.  We did have a lengthy discussion regarding amputation versus Charcot foot surgery.  He would like to pursue below-knee amputation at this point.  He will have Dr. Bruce do it in [place]. I did discuss vascular risk factors as they pertain to the progression of vascular disease including controlling hypertension and diabetes mellitus.  These risk factors are currently controlled and stable.  He can follow-up on a as needed basis.  The patient is to continue taking their medications as previously discussed.   This was all discussed in full with complete understanding.  Thank you for allowing me to participate in the care of your patient.  Please do not hesitate to call with any questions or concerns.  We will keep you aware of any further encounters with Andrzej Peters.        Sincerely yours,         Angus Ramirez, Frankie Decker,

## 2022-08-02 LAB
QT INTERVAL: 310 MS
QT INTERVAL: 324 MS
QTC INTERVAL: 465 MS
QTC INTERVAL: 469 MS

## 2022-08-05 LAB — BACTERIA SPEC AEROBE CULT: NORMAL

## 2024-05-22 NOTE — PLAN OF CARE
Pt is requesting refill for the following medication          levothyroxine 50 MCG Oral Tab, Take 1 tablet (50 mcg total) by mouth before breakfast., Disp: 30 tablet, Rfl: 0     results will improve  Description  Diagnostic test results will improve  Outcome: Ongoing  Goal: Prevent transmision of infection  Description  Prevent transmision of infection  Outcome: Ongoing     Problem: Tissue Perfusion:  Goal: Adequacy of tissue perfusion will improve  Description  Adequacy of tissue perfusion will improve  Outcome: Ongoing     Problem: Activity Intolerance:  Goal: Ability to tolerate increased activity will improve  Description  Ability to tolerate increased activity will improve  Outcome: Ongoing     Problem: Anxiety/Stress:  Goal: Level of anxiety will decrease  Description  Level of anxiety will decrease  Outcome: Ongoing     Problem:  Bowel Function - Altered:  Goal: Bowel elimination is within specified parameters  Description  Bowel elimination is within specified parameters  Outcome: Ongoing     Problem: Fluid Volume - Deficit:  Goal: Absence of fluid volume deficit signs and symptoms  Description  Absence of fluid volume deficit signs and symptoms  Outcome: Ongoing  Goal: Electrolytes within specified parameters  Description  Electrolytes within specified parameters  Outcome: Ongoing     Problem: Mental Status - Impaired:  Goal: Absence of physical injury  Description  Absence of physical injury  Outcome: Ongoing  Goal: Absence of continued neurological deterioration signs and symptoms  Description  Absence of continued neurological deterioration signs and symptoms  Outcome: Ongoing  Goal: Mental status will be restored to baseline  Description  Mental status will be restored to baseline  Outcome: Ongoing     Problem: Mobility - Impaired:  Goal: Mobility will improve to maximum level  Description  Mobility will improve to maximum level  Outcome: Ongoing     Problem: Nutrition Deficit:  Goal: Ability to achieve adequate nutritional intake will improve  Description  Ability to achieve adequate nutritional intake will improve  Outcome: Ongoing     Problem: Pain:  Goal: Pain level will
